# Patient Record
Sex: MALE | Race: WHITE | Employment: PART TIME | URBAN - METROPOLITAN AREA
[De-identification: names, ages, dates, MRNs, and addresses within clinical notes are randomized per-mention and may not be internally consistent; named-entity substitution may affect disease eponyms.]

---

## 2021-07-12 ENCOUNTER — HOSPITAL ENCOUNTER (INPATIENT)
Age: 61
LOS: 5 days | Discharge: HOME | DRG: 192 | End: 2021-07-17
Attending: EMERGENCY MEDICINE | Admitting: INTERNAL MEDICINE
Payer: MEDICAID

## 2021-07-12 ENCOUNTER — APPOINTMENT (OUTPATIENT)
Dept: GENERAL RADIOLOGY | Age: 61
DRG: 192 | End: 2021-07-12
Payer: MEDICAID

## 2021-07-12 DIAGNOSIS — I50.9 CONGESTIVE HEART FAILURE OF UNKNOWN ETIOLOGY (HCC): ICD-10-CM

## 2021-07-12 DIAGNOSIS — I50.9 CONGESTIVE HEART FAILURE, UNSPECIFIED HF CHRONICITY, UNSPECIFIED HEART FAILURE TYPE (HCC): Primary | ICD-10-CM

## 2021-07-12 PROBLEM — F10.10 ALCOHOL ABUSE: Status: ACTIVE | Noted: 2021-07-12

## 2021-07-12 PROBLEM — F17.200 SMOKING: Status: ACTIVE | Noted: 2021-07-12

## 2021-07-12 PROBLEM — I35.0 AORTIC STENOSIS: Status: ACTIVE | Noted: 2021-07-12

## 2021-07-12 PROBLEM — I50.1 ACUTE LEFT-SIDED CHF (CONGESTIVE HEART FAILURE) (HCC): Status: ACTIVE | Noted: 2021-07-12

## 2021-07-12 LAB
ABSOLUTE EOS #: 0.09 K/UL (ref 0–0.44)
ABSOLUTE IMMATURE GRANULOCYTE: <0.03 K/UL (ref 0–0.3)
ABSOLUTE LYMPH #: 1.15 K/UL (ref 1.1–3.7)
ABSOLUTE MONO #: 0.37 K/UL (ref 0.1–1.2)
ALBUMIN SERPL-MCNC: 3.6 G/DL (ref 3.5–5.2)
ALBUMIN/GLOBULIN RATIO: 1.2 (ref 1–2.5)
ALP BLD-CCNC: 135 U/L (ref 40–129)
ALT SERPL-CCNC: 26 U/L (ref 5–41)
ANION GAP SERPL CALCULATED.3IONS-SCNC: 9 MMOL/L (ref 9–17)
AST SERPL-CCNC: 34 U/L
BASOPHILS # BLD: 1 % (ref 0–2)
BASOPHILS ABSOLUTE: 0.03 K/UL (ref 0–0.2)
BILIRUB SERPL-MCNC: 0.91 MG/DL (ref 0.3–1.2)
BILIRUBIN DIRECT: 0.33 MG/DL
BILIRUBIN, INDIRECT: 0.58 MG/DL (ref 0–1)
BNP INTERPRETATION: ABNORMAL
BUN BLDV-MCNC: 23 MG/DL (ref 8–23)
BUN/CREAT BLD: ABNORMAL (ref 9–20)
CALCIUM SERPL-MCNC: 9.3 MG/DL (ref 8.6–10.4)
CHLORIDE BLD-SCNC: 107 MMOL/L (ref 98–107)
CO2: 23 MMOL/L (ref 20–31)
CREAT SERPL-MCNC: 0.72 MG/DL (ref 0.7–1.2)
DIFFERENTIAL TYPE: ABNORMAL
EOSINOPHILS RELATIVE PERCENT: 1 % (ref 1–4)
GFR AFRICAN AMERICAN: >60 ML/MIN
GFR NON-AFRICAN AMERICAN: >60 ML/MIN
GFR SERPL CREATININE-BSD FRML MDRD: ABNORMAL ML/MIN/{1.73_M2}
GFR SERPL CREATININE-BSD FRML MDRD: ABNORMAL ML/MIN/{1.73_M2}
GLOBULIN: ABNORMAL G/DL (ref 1.5–3.8)
GLUCOSE BLD-MCNC: 113 MG/DL (ref 70–99)
HCT VFR BLD CALC: 49.3 % (ref 40.7–50.3)
HEMOGLOBIN: 16 G/DL (ref 13–17)
IMMATURE GRANULOCYTES: 0 %
LV EF: 20 %
LVEF MODALITY: NORMAL
LYMPHOCYTES # BLD: 19 % (ref 24–43)
MCH RBC QN AUTO: 32.1 PG (ref 25.2–33.5)
MCHC RBC AUTO-ENTMCNC: 32.5 G/DL (ref 28.4–34.8)
MCV RBC AUTO: 99 FL (ref 82.6–102.9)
MONOCYTES # BLD: 6 % (ref 3–12)
NRBC AUTOMATED: 0 PER 100 WBC
PDW BLD-RTO: 15.8 % (ref 11.8–14.4)
PLATELET # BLD: 171 K/UL (ref 138–453)
PLATELET ESTIMATE: ABNORMAL
PMV BLD AUTO: 10.7 FL (ref 8.1–13.5)
POTASSIUM SERPL-SCNC: 4.3 MMOL/L (ref 3.7–5.3)
PRO-BNP: 6364 PG/ML
RBC # BLD: 4.98 M/UL (ref 4.21–5.77)
RBC # BLD: ABNORMAL 10*6/UL
SEG NEUTROPHILS: 73 % (ref 36–65)
SEGMENTED NEUTROPHILS ABSOLUTE COUNT: 4.57 K/UL (ref 1.5–8.1)
SODIUM BLD-SCNC: 139 MMOL/L (ref 135–144)
TOTAL PROTEIN: 6.7 G/DL (ref 6.4–8.3)
TROPONIN INTERP: ABNORMAL
TROPONIN INTERP: ABNORMAL
TROPONIN T: ABNORMAL NG/ML
TROPONIN T: ABNORMAL NG/ML
TROPONIN, HIGH SENSITIVITY: 67 NG/L (ref 0–22)
TROPONIN, HIGH SENSITIVITY: 72 NG/L (ref 0–22)
TSH SERPL DL<=0.05 MIU/L-ACNC: 4 MIU/L (ref 0.3–5)
WBC # BLD: 6.2 K/UL (ref 3.5–11.3)
WBC # BLD: ABNORMAL 10*3/UL

## 2021-07-12 PROCEDURE — 6360000002 HC RX W HCPCS: Performed by: STUDENT IN AN ORGANIZED HEALTH CARE EDUCATION/TRAINING PROGRAM

## 2021-07-12 PROCEDURE — 85025 COMPLETE CBC W/AUTO DIFF WBC: CPT

## 2021-07-12 PROCEDURE — 6370000000 HC RX 637 (ALT 250 FOR IP): Performed by: STUDENT IN AN ORGANIZED HEALTH CARE EDUCATION/TRAINING PROGRAM

## 2021-07-12 PROCEDURE — 83880 ASSAY OF NATRIURETIC PEPTIDE: CPT

## 2021-07-12 PROCEDURE — 84484 ASSAY OF TROPONIN QUANT: CPT

## 2021-07-12 PROCEDURE — 99223 1ST HOSP IP/OBS HIGH 75: CPT | Performed by: INTERNAL MEDICINE

## 2021-07-12 PROCEDURE — 36415 COLL VENOUS BLD VENIPUNCTURE: CPT

## 2021-07-12 PROCEDURE — 93005 ELECTROCARDIOGRAM TRACING: CPT | Performed by: STUDENT IN AN ORGANIZED HEALTH CARE EDUCATION/TRAINING PROGRAM

## 2021-07-12 PROCEDURE — 1200000000 HC SEMI PRIVATE

## 2021-07-12 PROCEDURE — 84443 ASSAY THYROID STIM HORMONE: CPT

## 2021-07-12 PROCEDURE — 99284 EMERGENCY DEPT VISIT MOD MDM: CPT

## 2021-07-12 PROCEDURE — 93306 TTE W/DOPPLER COMPLETE: CPT

## 2021-07-12 PROCEDURE — 80048 BASIC METABOLIC PNL TOTAL CA: CPT

## 2021-07-12 PROCEDURE — 80076 HEPATIC FUNCTION PANEL: CPT

## 2021-07-12 PROCEDURE — 71046 X-RAY EXAM CHEST 2 VIEWS: CPT

## 2021-07-12 PROCEDURE — 2580000003 HC RX 258: Performed by: STUDENT IN AN ORGANIZED HEALTH CARE EDUCATION/TRAINING PROGRAM

## 2021-07-12 RX ORDER — GAUZE BANDAGE 2" X 2"
100 BANDAGE TOPICAL DAILY
Status: DISCONTINUED | OUTPATIENT
Start: 2021-07-13 | End: 2021-07-17 | Stop reason: HOSPADM

## 2021-07-12 RX ORDER — HYDROCODONE BITARTRATE AND ACETAMINOPHEN 7.5; 325 MG/1; MG/1
1 TABLET ORAL EVERY 6 HOURS PRN
COMMUNITY
End: 2021-09-13 | Stop reason: ALTCHOICE

## 2021-07-12 RX ORDER — ONDANSETRON 4 MG/1
4 TABLET, ORALLY DISINTEGRATING ORAL EVERY 8 HOURS PRN
Status: DISCONTINUED | OUTPATIENT
Start: 2021-07-12 | End: 2021-07-17 | Stop reason: HOSPADM

## 2021-07-12 RX ORDER — ONDANSETRON 2 MG/ML
4 INJECTION INTRAMUSCULAR; INTRAVENOUS EVERY 6 HOURS PRN
Status: DISCONTINUED | OUTPATIENT
Start: 2021-07-12 | End: 2021-07-17 | Stop reason: HOSPADM

## 2021-07-12 RX ORDER — ACETAMINOPHEN 325 MG/1
650 TABLET ORAL EVERY 6 HOURS PRN
Status: DISCONTINUED | OUTPATIENT
Start: 2021-07-12 | End: 2021-07-16

## 2021-07-12 RX ORDER — SODIUM CHLORIDE 0.9 % (FLUSH) 0.9 %
5-40 SYRINGE (ML) INJECTION EVERY 12 HOURS SCHEDULED
Status: DISCONTINUED | OUTPATIENT
Start: 2021-07-12 | End: 2021-07-17 | Stop reason: HOSPADM

## 2021-07-12 RX ORDER — SODIUM CHLORIDE 9 MG/ML
25 INJECTION, SOLUTION INTRAVENOUS PRN
Status: DISCONTINUED | OUTPATIENT
Start: 2021-07-12 | End: 2021-07-17 | Stop reason: HOSPADM

## 2021-07-12 RX ORDER — FUROSEMIDE 10 MG/ML
20 INJECTION INTRAMUSCULAR; INTRAVENOUS ONCE
Status: COMPLETED | OUTPATIENT
Start: 2021-07-12 | End: 2021-07-12

## 2021-07-12 RX ORDER — POLYETHYLENE GLYCOL 3350 17 G/17G
17 POWDER, FOR SOLUTION ORAL DAILY PRN
Status: DISCONTINUED | OUTPATIENT
Start: 2021-07-12 | End: 2021-07-17 | Stop reason: HOSPADM

## 2021-07-12 RX ORDER — LORAZEPAM 2 MG/ML
0.5 INJECTION INTRAMUSCULAR ONCE
Status: COMPLETED | OUTPATIENT
Start: 2021-07-12 | End: 2021-07-12

## 2021-07-12 RX ORDER — LEVOTHYROXINE SODIUM 0.07 MG/1
75 TABLET ORAL DAILY
Status: DISCONTINUED | OUTPATIENT
Start: 2021-07-12 | End: 2021-07-17 | Stop reason: HOSPADM

## 2021-07-12 RX ORDER — HYDROCODONE BITARTRATE AND ACETAMINOPHEN 5; 325 MG/1; MG/1
1 TABLET ORAL EVERY 6 HOURS PRN
Status: DISCONTINUED | OUTPATIENT
Start: 2021-07-12 | End: 2021-07-17 | Stop reason: HOSPADM

## 2021-07-12 RX ORDER — FOLIC ACID 1 MG/1
1 TABLET ORAL DAILY
Status: DISCONTINUED | OUTPATIENT
Start: 2021-07-13 | End: 2021-07-17 | Stop reason: HOSPADM

## 2021-07-12 RX ORDER — SODIUM CHLORIDE 0.9 % (FLUSH) 0.9 %
5-40 SYRINGE (ML) INJECTION PRN
Status: DISCONTINUED | OUTPATIENT
Start: 2021-07-12 | End: 2021-07-17 | Stop reason: HOSPADM

## 2021-07-12 RX ORDER — FUROSEMIDE 10 MG/ML
20 INJECTION INTRAMUSCULAR; INTRAVENOUS DAILY
Status: DISCONTINUED | OUTPATIENT
Start: 2021-07-13 | End: 2021-07-13

## 2021-07-12 RX ORDER — ACETAMINOPHEN 650 MG/1
650 SUPPOSITORY RECTAL EVERY 6 HOURS PRN
Status: DISCONTINUED | OUTPATIENT
Start: 2021-07-12 | End: 2021-07-17 | Stop reason: HOSPADM

## 2021-07-12 RX ADMIN — FUROSEMIDE 20 MG: 10 INJECTION, SOLUTION INTRAMUSCULAR; INTRAVENOUS at 11:12

## 2021-07-12 RX ADMIN — LEVOTHYROXINE SODIUM 75 MCG: 75 TABLET ORAL at 17:00

## 2021-07-12 RX ADMIN — LORAZEPAM 0.5 MG: 2 INJECTION INTRAMUSCULAR; INTRAVENOUS at 17:01

## 2021-07-12 RX ADMIN — SODIUM CHLORIDE, PRESERVATIVE FREE 5 ML: 5 INJECTION INTRAVENOUS at 21:14

## 2021-07-12 RX ADMIN — ENOXAPARIN SODIUM 40 MG: 40 INJECTION SUBCUTANEOUS at 17:00

## 2021-07-12 RX ADMIN — HYDROCODONE BITARTRATE AND ACETAMINOPHEN 1 TABLET: 5; 325 TABLET ORAL at 17:00

## 2021-07-12 ASSESSMENT — ENCOUNTER SYMPTOMS
DIARRHEA: 0
NAUSEA: 0
RHINORRHEA: 0
CONSTIPATION: 0
SHORTNESS OF BREATH: 1
EYE PAIN: 0
ABDOMINAL PAIN: 0
ABDOMINAL DISTENTION: 0
COUGH: 1
EYE DISCHARGE: 0
VOMITING: 0

## 2021-07-12 ASSESSMENT — PAIN SCALES - GENERAL
PAINLEVEL_OUTOF10: 0
PAINLEVEL_OUTOF10: 0
PAINLEVEL_OUTOF10: 6

## 2021-07-12 NOTE — Clinical Note
Patient Class: Inpatient [101]   REQUIRED: Diagnosis: Congestive heart failure of unknown etiology [facility] [[id number]]   Estimated Length of Stay: Estimated stay of more than 2 midnights   Admitting Provider: Kennedi Juarez [[id number]]   Telemetry/Cardiac Monitoring Required?: Yes

## 2021-07-12 NOTE — ED NOTES
Pt to ED with sister via triage with c/o SOB and bilateral leg pain/swelling. Pt has had this swelling \"for a long time\" but it is \"very bad now. \"  99% O2 on RA. Pt denies CP, n/v/d/c, headache or dizziness.      Reggie Dill RN  07/12/21 [id number]

## 2021-07-12 NOTE — ED PROVIDER NOTES
[facility] ED     Emergency Department     Faculty Attestation    I performed a history and physical examination of the patient and discussed management with the resident. I reviewed the residents note and agree with the documented findings and plan of care. Any areas of disagreement are noted on the chart. I was personally present for the key portions of any procedures. I have documented in the chart those procedures where I was not present during the key portions. I have reviewed the emergency nurses triage note. I agree with the chief complaint, past medical history, past surgical history, allergies, medications, social and family history as documented unless otherwise noted below. For Physician Assistant/ Nurse Practitioner cases/documentation I have personally evaluated this patient and have completed at least one if not all key elements of the E/M (history, physical exam, and MDM). Additional findings are as noted. Patient presents with swelling to his bilateral lower extremities as well as shortness of breath with exertion. Patient says he was at his physical therapy appointment last week and the physical therapist noticed the swelling. He says that they wanted him to go to the ER then but said that he had things to do and so decided to come in today. He denies any pain in his chest.  He denies fever, cough, abdominal pain, nausea or vomiting. On exam, patient is resting comfortably in the bed. Lungs are clear to auscultation bilaterally heart sounds are normal.  There is moderate pitting edema to the bilateral lower extremities. No erythema or warmth. Will get EKG, chest x-ray, labs and reassess.     EKG Interpretation    Interpreted by emergency department physician    Rhythm: normal sinus   Rate: normal  Axis: left  Ectopy: none  Conduction: normal  ST Segments: nonspecific changes  T Waves: non specific changes  Q Waves: none    Clinical Impression: non-specific EKG    Jhonatan Palm MD Gerard Parra MD  Attending Emergency  Physician              Kaci Amador MD  07/12/21 1392

## 2021-07-12 NOTE — ED PROVIDER NOTES
[facility]  ED  Emergency Department Encounter  Emergency Medicine Resident     Pt Name: Yecenia Harper  MRN: [id number]  [place] 1960  Date of evaluation: 7/12/21  PCP:  Karla Apodaca MD    CHIEF COMPLAINT       Chief Complaint   Patient presents with    Shortness of Breath     pt states x1 week.  Joint Swelling     pt states bilateral ankle swelling       HISTORY OFPRESENT ILLNESS  (Location/Symptom, Timing/Onset, Context/Setting, Quality, Duration, Modifying Factors,Severity.)      Yecenia Harper is a 63 y. o.yo male who presents with difficult past medical history, with complaints of bilateral lower extremity leg edema for the past 10 days. Patient states that he has also had associated shortness of breath. Denies any chest pain at this time. States that he works for the [employer] and his leg swelling is worse at the end of the day when he has been up on his legs all day. Went to physical therapy due to multiple episodes of twisting his ankle while walking through yards. Patient states he has been having increasing swelling and pain for the past 10 days. Has not seen a physician in over 15 years for medical evaluation with exception of back pain which has been chronic. Denies any radiation of pain from his legs. States that they are swelling up towards his knees. Shortness of breath has been increasingly worsening over the past 10 days as well. Patient was at his physical therapy appointment today when he noted that he was having increasing swelling symptoms the emergency department to be evaluated. PAST MEDICAL / SURGICAL / SOCIAL / FAMILY HISTORY      has a past medical history of Osteoarthritis. has no past surgical history on file. Social:  reports that he has been smoking. He has a 10.00 pack-year smoking history. He has never used smokeless tobacco. He reports current alcohol use. He reports that he does not use drugs.     Family Hx: History reviewed. No pertinent family history. Allergies:  Patient has no known allergies. Home Medications:  Prior to Admission medications    Medication Sig Start Date End Date Taking? Authorizing Provider   HYDROcodone-acetaminophen (NORCO) 7.5-325 MG per tablet Take 1 tablet by mouth every 6 hours as needed for Pain. Yes Historical Provider, MD   meloxicam (MOBIC) 7.5 MG tablet TAKE 1 TAB BY MOUTH ONCE A DAY 4/13/21  Yes Victoria Kuo MD   levothyroxine (SYNTHROID) 75 MCG tablet TAKE 1 TAB BY MOUTH ONCE A DAY 4/13/21  Yes Victoria Kuo MD       REVIEW OFSYSTEMS    (2-9 systems for level 4, 10 or more for level 5)      Review of Systems   Constitutional: Negative for chills and fever. HENT: Negative for congestion, ear pain and rhinorrhea. Eyes: Negative for pain and discharge. Respiratory: Positive for cough and shortness of breath. Cardiovascular: Positive for leg swelling. Negative for chest pain and palpitations. Gastrointestinal: Negative for abdominal pain, constipation, diarrhea, nausea and vomiting. Genitourinary: Negative for difficulty urinating and hematuria. Musculoskeletal: Negative for arthralgias and myalgias. Skin: Negative for rash and wound. Neurological: Negative for light-headedness and headaches. PHYSICAL EXAM   (up to 7 for level 4, 8 or more forlevel 5)      INITIAL VITALS:   Vitals:    07/12/21 2130   BP: 101/78   Pulse: 62   Resp:    Temp:    SpO2:         Physical Exam  Vitals and nursing note reviewed. Constitutional:       General: He is not in acute distress. Appearance: He is not ill-appearing, toxic-appearing or diaphoretic. HENT:      Head: Normocephalic and atraumatic. Nose: Nose normal. No congestion or rhinorrhea. Mouth/Throat:      Mouth: Mucous membranes are moist.      Pharynx: Oropharynx is clear. No oropharyngeal exudate or posterior oropharyngeal erythema. Eyes:      General:         Right eye: No discharge. Left eye: No discharge. Pupils: Pupils are equal, round, and reactive to light. Cardiovascular:      Rate and Rhythm: Normal rate and regular rhythm. Pulses: Normal pulses. Heart sounds: No murmur heard. No friction rub. No gallop. Comments: Pulses palpable at bilateral radial arteries, 2+   Pulmonary:      Effort: Pulmonary effort is normal. No respiratory distress. Breath sounds: Normal breath sounds. No wheezing, rhonchi or rales. Chest:      Chest wall: No mass, tenderness or edema. Abdominal:      General: Abdomen is flat. There is no distension. Palpations: Abdomen is soft. Tenderness: There is no abdominal tenderness. There is no guarding or rebound. Musculoskeletal:      Cervical back: Normal range of motion. Right lower leg: Edema (1+ pitting) present. Left lower leg: Edema (1+ pitting) present. Skin:     General: Skin is warm and dry. Capillary Refill: Capillary refill takes less than 2 seconds. Neurological:      General: No focal deficit present. Mental Status: He is alert and oriented to person, place, and time. Comments: Moving all extremities equally. Psychiatric:         Mood and Affect: Mood normal.         Behavior: Behavior normal.         DIFFERENTIAL  DIAGNOSIS       Initial MDM/Plan: 63 y.o. male who presents with 10 days of worsening bilateral lower extremity edema. Patient presents with vital signs stable, in no acute distress, no respiratory distress, resting in the cot doubly. Physical exam is grossly unremarkable with exception for bilateral lower extremity edema. Patient has not seen a physician in about 15 years has not had his heart evaluated, noting no recent echo. Does not take any medications for his heart. Plan for cardiac work-up including CBC, BMP, BNP, troponins x2, EKG, chest x-ray. CBC showing no acute abnormalities, no signs of infection, no acute anemia.   BNP is significantly elevated at 6300.  Will provide one-time dose of Lasix, patient is Lasix naïve. Feeling quite anxious at this time due to fear of being in the hospital not having been hospital in quite some time. Did provide 0.5 mg of Ativan, good relief. Discussed that this is likely CHF, new onset, patient is understanding of this. Patient is agreeable to stay in the hospital.  Patient's blood pressure has been within normal limits, if blood pressure does begin to elevate will consider nitro drip. Patient continues to be chest pain-free. Troponin initially 72, repeat down to 67. Chest x-ray showing cardiomegaly with mild increased vascularization. Patient continues to be saturating well on room air. Decision to admit for initial CHF exacerbation. Discussed with internal medicine team.  They are excepting patient at this time. Patient boarding in the emergency department awaiting bed upstairs. Admitted in stable condition after being vitally stable throughout emergency department stay.     DIAGNOSTIC RESULTS / EMERGENCYDEPARTMENT COURSE / MDM     LABS:  Labs Reviewed   CBC WITH AUTO DIFFERENTIAL - Abnormal; Notable for the following components:       Result Value    RDW 15.8 (*)     Seg Neutrophils 73 (*)     Lymphocytes 19 (*)     All other components within normal limits   BASIC METABOLIC PANEL W/ REFLEX TO MG FOR LOW K - Abnormal; Notable for the following components:    Glucose 113 (*)     All other components within normal limits   HEPATIC FUNCTION PANEL - Abnormal; Notable for the following components:    Alkaline Phosphatase 135 (*)     Bilirubin, Direct 0.33 (*)     All other components within normal limits   TROPONIN - Abnormal; Notable for the following components:    Troponin, High Sensitivity 67 (*)     All other components within normal limits   BRAIN NATRIURETIC PEPTIDE - Abnormal; Notable for the following components:    Pro-BNP 6,364 (*)     All other components within normal limits   TROPONIN - Abnormal; Notable for the following components:    Troponin, High Sensitivity 72 (*)     All other components within normal limits   BASIC METABOLIC PANEL W/ REFLEX TO MG FOR LOW K - Abnormal; Notable for the following components:    Anion Gap 8 (*)     All other components within normal limits   CBC WITH AUTO DIFFERENTIAL - Abnormal; Notable for the following components:    RDW 15.8 (*)     Seg Neutrophils 68 (*)     Lymphocytes 23 (*)     All other components within normal limits   LIPID PANEL - Abnormal; Notable for the following components:    HDL 36 (*)     All other components within normal limits   TSH WITH REFLEX   HEMOGLOBIN A1C   POCT GLUCOSE   POCT GLUCOSE   POCT GLUCOSE         RADIOLOGY:  XR CHEST (2 VW)    Result Date: 7/12/2021  EXAMINATION: TWO XRAY VIEWS OF THE CHEST 7/12/2021 9:59 am COMPARISON: None. HISTORY: ORDERING SYSTEM PROVIDED HISTORY: shortness of breath TECHNOLOGIST PROVIDED HISTORY: shortness of breath FINDINGS: Frontal and lateral views of the chest are electronically marked regarding sides. Marked cardiomegaly. Pulmonary vascularity is within normal limits. No infiltrates, effusions or pneumothoraces. Mild-moderate decreased anterior vertebral body height at the thoracolumbar junction, chronicity unknown. There are some marginal osteophytes seen within the spine. Prominent cardiomegaly without evidence of CHF or pulmonary edema. Anterior wedged lower thoracic vertebra, chronicity unknown. EKG  Normal, normal sinus rhythm, Axis is leftward, intervals within normal limits including CO, QT/QTc.  QRS is prolonged at 132. T wave inversion noted in lead I and aVL. No ST segment elevation, no ST segment depression, poor R wave progression. Nonspecific EKG.     All EKG's are interpreted by the Emergency Department Physicianwho either signs or Co-signs this chart in the absence of a cardiologist.    EMERGENCY DEPARTMENT COURSE:          PROCEDURES:  None    CONSULTS:  IP CONSULT TO INTERNAL MEDICINE  IP CONSULT TO CASE MANAGEMENT  IP CONSULT TO CARDIOLOGY      FINAL IMPRESSION      1. Congestive heart failure, unspecified HF chronicity, unspecified heart failure type ([address]          DISPOSITION / PLAN     DISPOSITION Admitted 07/12/2021 11:10:07 AM      PATIENT REFERRED TO:  No follow-up provider specified.     DISCHARGE MEDICATIONS:  Current Discharge Medication List          Randy Martins DO  Emergency Medicine Resident    (Please note that portions of this note were completed with a voice recognition program.Efforts were made to edit the dictations but occasionally words are mis-transcribed.)       Randy Martins DO  Resident  07/13/21 5236

## 2021-07-12 NOTE — H&P
[facility]     Department of Internal Medicine - Staff Internal Medicine Teaching Service          ADMISSION NOTE/HISTORY AND PHYSICAL EXAMINATION   Date: 7/12/2021  Patient Name: Lizzeth Laureano  Date of admission: 7/12/2021  9:19 AM  YOB: 1960  PCP: Pradeep Ruiz MD  History Obtained From:  patient    CHIEF COMPLAINT     Chief complaint: LE swelling and SOB     HISTORY OF PRESENTING ILLNESS     The patient is a pleasant 63 y.o. male past medical history of recent hip injury, osteoarthritis, hypothyroidism presents with a chief complaint of   Shortness of breath and lower leg swelling for the past 10 days. .  Patient 1st noticed during his physical therapy sessions when his physical therapist noticed the swelling. states that his legs have looked increasingly more swollen than regular. Denies any chest pain nausea vomiting or abdominal pain. He reports no past history of CHF or any cardiac events. Patient does report that he has chest pain or shortness of breath whenever he exerts himself. Echo performed showed 20% EF with substantial aortic stenosis. At bedside patient is resting comfortably with no increased work of breathing. Vitals:    07/12/21 1955   BP: (!) 83/49   Pulse: 55   Resp: 20   Temp: 98 °F (36.7 °C)   SpO2: 95%       On my evaluation,  Vitals:    07/12/21 1955   BP: (!) 83/49   Pulse: 55   Resp: 20   Temp: 98 °F (36.7 °C)   SpO2: 95%         Review of Systems:  Review of Systems   Constitutional: Positive for fatigue. Negative for activity change, appetite change, chills and diaphoresis. Respiratory: Positive for shortness of breath. Cardiovascular: Positive for leg swelling. Negative for chest pain and palpitations. Gastrointestinal: Negative for abdominal distention, abdominal pain, constipation, diarrhea, nausea and vomiting. Musculoskeletal: Positive for gait problem. Skin: Negative.     Neurological: Negative for dizziness, facial asymmetry, light-headedness and headaches. Psychiatric/Behavioral: Negative for agitation, behavioral problems and confusion. PAST MEDICAL HISTORY     Past Medical History:   Diagnosis Date    Osteoarthritis        PAST SURGICAL HISTORY     History reviewed. No pertinent surgical history. ALLERGIES     Patient has no known allergies. MEDICATIONS PRIOR TO ADMISSION     Prior to Admission medications    Medication Sig Start Date End Date Taking? Authorizing Provider   HYDROcodone-acetaminophen (NORCO) 7.5-325 MG per tablet Take 1 tablet by mouth every 6 hours as needed for Pain. Yes Historical Provider, MD   meloxicam (MOBIC) 7.5 MG tablet TAKE 1 TAB BY MOUTH ONCE A DAY 21  Yes Victoria Kuo MD   levothyroxine (SYNTHROID) 75 MCG tablet TAKE 1 TAB BY MOUTH ONCE A DAY 21  Yes Victoria Kuo MD       SOCIAL HISTORY     Tobacco: yes  Alcohol: yes  Illicits: no      FAMILY HISTORY     History reviewed. No pertinent family history. PHYSICAL EXAM     Vitals: BP (!) 83/49   Pulse 55   Temp 98 °F (36.7 °C) (Oral)   Resp 20   Ht 5' 10\" (1.778 m)   Wt 194 lb 10.7 oz (88.3 kg)   SpO2 95%   BMI 27.93 kg/m²   Tmax: Temp (24hrs), Av.5 °F (36.4 °C), Min:97 °F (36.1 °C), Max:98 °F (36.7 °C)    Last Body weight:   Wt Readings from Last 3 Encounters:   21 194 lb 10.7 oz (88.3 kg)   21 186 lb (84.4 kg)   21 192 lb (87.1 kg)     Body Mass Index : Body mass index is 27.93 kg/m². PHYSICAL EXAMINATION:  Physical Exam  Constitutional:       Appearance: Normal appearance. Cardiovascular:      Rate and Rhythm: Normal rate and regular rhythm. Pulses: Normal pulses. Heart sounds: Normal heart sounds. Pulmonary:      Effort: Pulmonary effort is normal.   Abdominal:      General: There is no distension. Palpations: Abdomen is soft. Tenderness: There is no abdominal tenderness. There is no rebound.    Musculoskeletal:         General: Swelling present. Right lower leg: Edema present. Left lower leg: Edema present. Skin:     General: Skin is warm and dry. Neurological:      General: No focal deficit present. Mental Status: He is alert and oriented to person, place, and time.    Psychiatric:         Mood and Affect: Mood normal.         Behavior: Behavior normal.           INVESTIGATIONS     Laboratory Testing:     Recent Results (from the past 24 hour(s))   CBC Auto Differential    Collection Time: 07/12/21 10:02 AM   Result Value Ref Range    WBC 6.2 3.5 - 11.3 k/uL    RBC 4.98 4.21 - 5.77 m/uL    Hemoglobin 16.0 13.0 - 17.0 g/dL    Hematocrit 49.3 40.7 - 50.3 %    MCV 99.0 82.6 - 102.9 fL    MCH 32.1 25.2 - 33.5 pg    MCHC 32.5 28.4 - 34.8 g/dL    RDW 15.8 (H) 11.8 - 14.4 %    Platelets 538 063 - 214 k/uL    MPV 10.7 8.1 - 13.5 fL    NRBC Automated 0.0 0.0 per 100 WBC    Differential Type NOT REPORTED     Seg Neutrophils 73 (H) 36 - 65 %    Lymphocytes 19 (L) 24 - 43 %    Monocytes 6 3 - 12 %    Eosinophils % 1 1 - 4 %    Basophils 1 0 - 2 %    Immature Granulocytes 0 0 %    Segs Absolute 4.57 1.50 - 8.10 k/uL    Absolute Lymph # 1.15 1.10 - 3.70 k/uL    Absolute Mono # 0.37 0.10 - 1.20 k/uL    Absolute Eos # 0.09 0.00 - 0.44 k/uL    Basophils Absolute 0.03 0.00 - 0.20 k/uL    Absolute Immature Granulocyte <0.03 0.00 - 0.30 k/uL    WBC Morphology NOT REPORTED     RBC Morphology ANISOCYTOSIS PRESENT     Platelet Estimate NOT REPORTED    Basic Metabolic Panel w/ Reflex to MG    Collection Time: 07/12/21 10:02 AM   Result Value Ref Range    Glucose 113 (H) 70 - 99 mg/dL    BUN 23 8 - 23 mg/dL    CREATININE 0.72 0.70 - 1.20 mg/dL    Bun/Cre Ratio NOT REPORTED 9 - 20    Calcium 9.3 8.6 - 10.4 mg/dL    Sodium 139 135 - 144 mmol/L    Potassium 4.3 3.7 - 5.3 mmol/L    Chloride 107 98 - 107 mmol/L    CO2 23 20 - 31 mmol/L    Anion Gap 9 9 - 17 mmol/L    GFR Non-African American >60 >60 mL/min    GFR African American >60 >60 mL/min    GFR Comment GFR Staging NOT REPORTED    Hepatic Function Panel    Collection Time: 07/12/21 10:02 AM   Result Value Ref Range    Albumin 3.6 3.5 - 5.2 g/dL    Alkaline Phosphatase 135 (H) 40 - 129 U/L    ALT 26 5 - 41 U/L    AST 34 <40 U/L    Total Bilirubin 0.91 0.3 - 1.2 mg/dL    Bilirubin, Direct 0.33 (H) <0.31 mg/dL    Bilirubin, Indirect 0.58 0.00 - 1.00 mg/dL    Total Protein 6.7 6.4 - 8.3 g/dL    Globulin NOT REPORTED 1.5 - 3.8 g/dL    Albumin/Globulin Ratio 1.2 1.0 - 2.5   Brain Natriuretic Peptide    Collection Time: 07/12/21 10:02 AM   Result Value Ref Range    Pro-BNP 6,364 (H) <300 pg/mL    BNP Interpretation Pro-BNP Reference Range:    Troponin    Collection Time: 07/12/21 10:02 AM   Result Value Ref Range    Troponin, High Sensitivity 72 (HH) 0 - 22 ng/L    Troponin T NOT REPORTED <0.03 ng/mL    Troponin Interp NOT REPORTED    Troponin    Collection Time: 07/12/21 11:25 AM   Result Value Ref Range    Troponin, High Sensitivity 67 (HH) 0 - 22 ng/L    Troponin T NOT REPORTED <0.03 ng/mL    Troponin Interp NOT REPORTED    TSH with Reflex    Collection Time: 07/12/21 11:55 AM   Result Value Ref Range    TSH 4.00 0.30 - 5.00 mIU/L       Imaging:   XR CHEST (2 VW)    Result Date: 7/12/2021  Prominent cardiomegaly without evidence of CHF or pulmonary edema. Anterior wedged lower thoracic vertebra, chronicity unknown.        ASSESSMENT & PLAN     ASSESSMENT:     Primary Problem  Aortic stenosis    Active Hospital Problems    Diagnosis Date Noted    Congestive heart failure of unknown etiology ([facility] [address] [I50.9] 07/12/2021    Acute left-sided CHF (congestive heart failure) ([address] [I50.1] 07/12/2021    Smoking [F17.200] 07/12/2021    Aortic stenosis [I35.0] 07/12/2021    Alcohol abuse [F10.10] 07/12/2021    Hypothyroid [E03.9] 04/20/2015       PLAN:     IMPRESSION  This is a 63 y.o. male who presented with above mentioned complaints and was admitted to inpatient service for further management as follows:

## 2021-07-12 NOTE — PROGRESS NOTES
Physical Therapy        Physical Therapy Cancel Note      DATE: 2021    NAME: Fabiola Giron  MRN: [id number]   : 1960      Patient not seen this date for Physical Therapy due to:    Patient Declined: Pt stated he is tired. Just up from ED. 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       Electronically signed by Zara Blake PT on 2021 at 2:31 PM

## 2021-07-12 NOTE — ED NOTES
Repeat trop sent.    Case management at bedside speaking with pt     Francis Santiago RN  07/12/21 [id number]

## 2021-07-12 NOTE — CARE COORDINATION
Case Management Initial Discharge Plan  Victor Hugo Lama,             Met with:patient to discuss discharge plans. Information verified: address, contacts, phone number, , insurance Yes  Insurance Provider: none    Emergency Contact/Next of Kin name & number: sister Akhil Gaxiola verified as per face sheet  Who are involved in patient's support system? Sister (at bedside)    PCP: Daniel Mariano MD  Date of last visit: 2021      Discharge Planning    Living Arrangements:    son lives with patient in patients home    Home has 3 stories  5 stairs to climb to get into front door, flight of stairs to reach second floor, flight to basement  Location of bedroom/bathroom in home bedroom main, shower in basement    Patient able to perform ADL's:Independent    Current Services (outpatient & in home) DME  DME equipment: cane, tens unit  DME provider:     Is patient receiving oral anticoagulation therapy? No    If indicated:   Physician managing anticoagulation treatment: none  Where does patient obtain lab work for ATC treatment? na      Potential Assistance Needed:       Patient agreeable to home care: Yes  Freedom of choice provided:  monitor for needs, works nights and sleep is split    Prior SNF/Rehab Placement and Facility: none  Agreeable to SNF/Rehab: No  [place] of choice provided: n/a     Evaluation: no    Expected Discharge date:       Patient expects to be discharged to: If home: is the family and/or caregiver wiling & able to provide support at home? yes  Who will be providing this support? son*    Follow Up Appointment: Best Day/ Time:      Transportation provider:   Transportation arrangements needed for discharge: No    Readmission Risk              Risk of Unplanned Readmission:  0             Does patient have a readmission risk score greater than 14?: No  If yes, follow-up appointment must be made within 7 days of discharge.      Goals of Care: self care      Educated pt on transitional options, provided freedom of choice and are agreeable with plan      Discharge Plan: home independent, has transport, monitor for home care needs, help program notified (message left for Melanie Aguirre)          Electronically signed by Kennis Cushing, RN on 7/12/21 at 11:52 AM EDT

## 2021-07-13 ENCOUNTER — APPOINTMENT (OUTPATIENT)
Dept: MRI IMAGING | Age: 61
DRG: 192 | End: 2021-07-13
Payer: MEDICAID

## 2021-07-13 LAB
ABSOLUTE EOS #: 0.11 K/UL (ref 0–0.44)
ABSOLUTE IMMATURE GRANULOCYTE: <0.03 K/UL (ref 0–0.3)
ABSOLUTE LYMPH #: 1.32 K/UL (ref 1.1–3.7)
ABSOLUTE MONO #: 0.32 K/UL (ref 0.1–1.2)
ANION GAP SERPL CALCULATED.3IONS-SCNC: 8 MMOL/L (ref 9–17)
BASOPHILS # BLD: 1 % (ref 0–2)
BASOPHILS ABSOLUTE: 0.03 K/UL (ref 0–0.2)
BUN BLDV-MCNC: 21 MG/DL (ref 8–23)
BUN/CREAT BLD: ABNORMAL (ref 9–20)
CALCIUM SERPL-MCNC: 8.7 MG/DL (ref 8.6–10.4)
CHLORIDE BLD-SCNC: 105 MMOL/L (ref 98–107)
CHOLESTEROL/HDL RATIO: 3.6
CHOLESTEROL: 131 MG/DL
CO2: 24 MMOL/L (ref 20–31)
CREAT SERPL-MCNC: 0.89 MG/DL (ref 0.7–1.2)
DIFFERENTIAL TYPE: ABNORMAL
EKG ATRIAL RATE: 72 BPM
EKG P AXIS: 52 DEGREES
EKG P-R INTERVAL: 190 MS
EKG Q-T INTERVAL: 438 MS
EKG QRS DURATION: 132 MS
EKG QTC CALCULATION (BAZETT): 479 MS
EKG R AXIS: -54 DEGREES
EKG T AXIS: 131 DEGREES
EKG VENTRICULAR RATE: 72 BPM
EOSINOPHILS RELATIVE PERCENT: 2 % (ref 1–4)
ESTIMATED AVERAGE GLUCOSE: 126 MG/DL
GFR AFRICAN AMERICAN: >60 ML/MIN
GFR NON-AFRICAN AMERICAN: >60 ML/MIN
GFR SERPL CREATININE-BSD FRML MDRD: ABNORMAL ML/MIN/{1.73_M2}
GFR SERPL CREATININE-BSD FRML MDRD: ABNORMAL ML/MIN/{1.73_M2}
GLUCOSE BLD-MCNC: 114 MG/DL (ref 75–110)
GLUCOSE BLD-MCNC: 118 MG/DL (ref 75–110)
GLUCOSE BLD-MCNC: 85 MG/DL (ref 75–110)
GLUCOSE BLD-MCNC: 93 MG/DL (ref 75–110)
GLUCOSE BLD-MCNC: 97 MG/DL (ref 70–99)
HBA1C MFR BLD: 6 % (ref 4–6)
HCT VFR BLD CALC: 48.6 % (ref 40.7–50.3)
HDLC SERPL-MCNC: 36 MG/DL
HEMOGLOBIN: 15.5 G/DL (ref 13–17)
IMMATURE GRANULOCYTES: 0 %
LDL CHOLESTEROL: 80 MG/DL (ref 0–130)
LYMPHOCYTES # BLD: 23 % (ref 24–43)
MCH RBC QN AUTO: 31.6 PG (ref 25.2–33.5)
MCHC RBC AUTO-ENTMCNC: 31.9 G/DL (ref 28.4–34.8)
MCV RBC AUTO: 99.2 FL (ref 82.6–102.9)
MONOCYTES # BLD: 6 % (ref 3–12)
NRBC AUTOMATED: 0 PER 100 WBC
PDW BLD-RTO: 15.8 % (ref 11.8–14.4)
PLATELET # BLD: 184 K/UL (ref 138–453)
PLATELET ESTIMATE: ABNORMAL
PMV BLD AUTO: 11 FL (ref 8.1–13.5)
POTASSIUM SERPL-SCNC: 4.3 MMOL/L (ref 3.7–5.3)
RBC # BLD: 4.9 M/UL (ref 4.21–5.77)
RBC # BLD: ABNORMAL 10*6/UL
SEG NEUTROPHILS: 68 % (ref 36–65)
SEGMENTED NEUTROPHILS ABSOLUTE COUNT: 3.9 K/UL (ref 1.5–8.1)
SODIUM BLD-SCNC: 137 MMOL/L (ref 135–144)
TRIGL SERPL-MCNC: 74 MG/DL
VLDLC SERPL CALC-MCNC: ABNORMAL MG/DL (ref 1–30)
WBC # BLD: 5.7 K/UL (ref 3.5–11.3)
WBC # BLD: ABNORMAL 10*3/UL

## 2021-07-13 PROCEDURE — 6360000002 HC RX W HCPCS: Performed by: STUDENT IN AN ORGANIZED HEALTH CARE EDUCATION/TRAINING PROGRAM

## 2021-07-13 PROCEDURE — 36415 COLL VENOUS BLD VENIPUNCTURE: CPT

## 2021-07-13 PROCEDURE — 83036 HEMOGLOBIN GLYCOSYLATED A1C: CPT

## 2021-07-13 PROCEDURE — 85025 COMPLETE CBC W/AUTO DIFF WBC: CPT

## 2021-07-13 PROCEDURE — 80048 BASIC METABOLIC PNL TOTAL CA: CPT

## 2021-07-13 PROCEDURE — 99232 SBSQ HOSP IP/OBS MODERATE 35: CPT | Performed by: INTERNAL MEDICINE

## 2021-07-13 PROCEDURE — 1200000000 HC SEMI PRIVATE

## 2021-07-13 PROCEDURE — 6370000000 HC RX 637 (ALT 250 FOR IP): Performed by: STUDENT IN AN ORGANIZED HEALTH CARE EDUCATION/TRAINING PROGRAM

## 2021-07-13 PROCEDURE — 6370000000 HC RX 637 (ALT 250 FOR IP): Performed by: INTERNAL MEDICINE

## 2021-07-13 PROCEDURE — 80061 LIPID PANEL: CPT

## 2021-07-13 PROCEDURE — 2580000003 HC RX 258: Performed by: STUDENT IN AN ORGANIZED HEALTH CARE EDUCATION/TRAINING PROGRAM

## 2021-07-13 PROCEDURE — 72146 MRI CHEST SPINE W/O DYE: CPT

## 2021-07-13 PROCEDURE — 82947 ASSAY GLUCOSE BLOOD QUANT: CPT

## 2021-07-13 PROCEDURE — 93970 EXTREMITY STUDY: CPT

## 2021-07-13 RX ORDER — HYDROXYZINE HYDROCHLORIDE 10 MG/1
10 TABLET, FILM COATED ORAL ONCE
Status: COMPLETED | OUTPATIENT
Start: 2021-07-13 | End: 2021-07-13

## 2021-07-13 RX ORDER — LORAZEPAM 2 MG/ML
0.5 INJECTION INTRAMUSCULAR ONCE
Status: COMPLETED | OUTPATIENT
Start: 2021-07-13 | End: 2021-07-13

## 2021-07-13 RX ORDER — DEXTROSE MONOHYDRATE 25 G/50ML
12.5 INJECTION, SOLUTION INTRAVENOUS PRN
Status: DISCONTINUED | OUTPATIENT
Start: 2021-07-13 | End: 2021-07-17 | Stop reason: HOSPADM

## 2021-07-13 RX ORDER — LORAZEPAM 0.5 MG/1
0.5 TABLET ORAL ONCE
Status: DISCONTINUED | OUTPATIENT
Start: 2021-07-13 | End: 2021-07-13

## 2021-07-13 RX ORDER — ASPIRIN 81 MG/1
81 TABLET, CHEWABLE ORAL DAILY
Status: DISCONTINUED | OUTPATIENT
Start: 2021-07-13 | End: 2021-07-17 | Stop reason: HOSPADM

## 2021-07-13 RX ORDER — FUROSEMIDE 10 MG/ML
20 INJECTION INTRAMUSCULAR; INTRAVENOUS ONCE
Status: COMPLETED | OUTPATIENT
Start: 2021-07-13 | End: 2021-07-13

## 2021-07-13 RX ORDER — ATORVASTATIN CALCIUM 20 MG/1
20 TABLET, FILM COATED ORAL NIGHTLY
Status: DISCONTINUED | OUTPATIENT
Start: 2021-07-13 | End: 2021-07-17 | Stop reason: HOSPADM

## 2021-07-13 RX ORDER — DEXTROSE MONOHYDRATE 50 MG/ML
100 INJECTION, SOLUTION INTRAVENOUS PRN
Status: DISCONTINUED | OUTPATIENT
Start: 2021-07-13 | End: 2021-07-17 | Stop reason: HOSPADM

## 2021-07-13 RX ORDER — FUROSEMIDE 10 MG/ML
40 INJECTION INTRAMUSCULAR; INTRAVENOUS 2 TIMES DAILY
Status: DISCONTINUED | OUTPATIENT
Start: 2021-07-13 | End: 2021-07-17 | Stop reason: HOSPADM

## 2021-07-13 RX ORDER — NICOTINE POLACRILEX 4 MG
15 LOZENGE BUCCAL PRN
Status: DISCONTINUED | OUTPATIENT
Start: 2021-07-13 | End: 2021-07-17 | Stop reason: HOSPADM

## 2021-07-13 RX ORDER — FUROSEMIDE 10 MG/ML
40 INJECTION INTRAMUSCULAR; INTRAVENOUS 2 TIMES DAILY
Status: DISCONTINUED | OUTPATIENT
Start: 2021-07-13 | End: 2021-07-13

## 2021-07-13 RX ADMIN — HYDROCODONE BITARTRATE AND ACETAMINOPHEN 1 TABLET: 5; 325 TABLET ORAL at 11:33

## 2021-07-13 RX ADMIN — FUROSEMIDE 20 MG: 10 INJECTION, SOLUTION INTRAVENOUS at 12:22

## 2021-07-13 RX ADMIN — FOLIC ACID 1 MG: 1 TABLET ORAL at 08:33

## 2021-07-13 RX ADMIN — ENOXAPARIN SODIUM 40 MG: 40 INJECTION SUBCUTANEOUS at 08:34

## 2021-07-13 RX ADMIN — SODIUM CHLORIDE, PRESERVATIVE FREE 10 ML: 5 INJECTION INTRAVENOUS at 21:29

## 2021-07-13 RX ADMIN — Medication 100 MG: at 08:33

## 2021-07-13 RX ADMIN — LEVOTHYROXINE SODIUM 75 MCG: 75 TABLET ORAL at 08:33

## 2021-07-13 RX ADMIN — HYDROXYZINE HYDROCHLORIDE 10 MG: 10 TABLET ORAL at 21:29

## 2021-07-13 RX ADMIN — FUROSEMIDE 40 MG: 10 INJECTION, SOLUTION INTRAMUSCULAR; INTRAVENOUS at 21:27

## 2021-07-13 RX ADMIN — SODIUM CHLORIDE, PRESERVATIVE FREE 10 ML: 5 INJECTION INTRAVENOUS at 08:34

## 2021-07-13 RX ADMIN — FUROSEMIDE 20 MG: 10 INJECTION, SOLUTION INTRAVENOUS at 08:33

## 2021-07-13 RX ADMIN — LORAZEPAM 0.5 MG: 2 INJECTION INTRAMUSCULAR; INTRAVENOUS at 13:20

## 2021-07-13 RX ADMIN — HYDROCODONE BITARTRATE AND ACETAMINOPHEN 1 TABLET: 5; 325 TABLET ORAL at 18:08

## 2021-07-13 RX ADMIN — ASPIRIN 81 MG: 81 TABLET, CHEWABLE ORAL at 08:33

## 2021-07-13 RX ADMIN — HYDROCODONE BITARTRATE AND ACETAMINOPHEN 1 TABLET: 5; 325 TABLET ORAL at 03:37

## 2021-07-13 RX ADMIN — ATORVASTATIN CALCIUM 20 MG: 20 TABLET, FILM COATED ORAL at 21:29

## 2021-07-13 RX ADMIN — HYDROXYZINE HYDROCHLORIDE 10 MG: 10 TABLET ORAL at 04:36

## 2021-07-13 ASSESSMENT — PAIN DESCRIPTION - DESCRIPTORS
DESCRIPTORS: ACHING
DESCRIPTORS: ACHING

## 2021-07-13 ASSESSMENT — PAIN DESCRIPTION - LOCATION
LOCATION: HIP
LOCATION: HIP

## 2021-07-13 ASSESSMENT — PAIN DESCRIPTION - ORIENTATION
ORIENTATION: LEFT
ORIENTATION: LEFT

## 2021-07-13 ASSESSMENT — PAIN SCALES - GENERAL
PAINLEVEL_OUTOF10: 6
PAINLEVEL_OUTOF10: 6
PAINLEVEL_OUTOF10: 2
PAINLEVEL_OUTOF10: 6
PAINLEVEL_OUTOF10: 3
PAINLEVEL_OUTOF10: 3
PAINLEVEL_OUTOF10: 0

## 2021-07-13 ASSESSMENT — PAIN DESCRIPTION - PAIN TYPE
TYPE: ACUTE PAIN
TYPE: ACUTE PAIN

## 2021-07-13 ASSESSMENT — PAIN DESCRIPTION - ONSET: ONSET: GRADUAL

## 2021-07-13 ASSESSMENT — PAIN - FUNCTIONAL ASSESSMENT: PAIN_FUNCTIONAL_ASSESSMENT: PREVENTS OR INTERFERES SOME ACTIVE ACTIVITIES AND ADLS

## 2021-07-13 ASSESSMENT — PAIN DESCRIPTION - FREQUENCY: FREQUENCY: INTERMITTENT

## 2021-07-13 NOTE — PROGRESS NOTES
Internal Medicine Teaching Service Senior Note      This is a 63 y.o. male admitted 7/12/2021 for Congestive heart failure of unknown etiology ([facility] [I50.9]  Acute left-sided CHF (congestive heart failure) ([facility] [I50.1]. Patient came in with Chief complaint of   Chief Complaint   Patient presents with    Shortness of Breath     pt states x1 week.  Joint Swelling     pt states bilateral ankle swelling       See H&P of admitting/intern resident for more details. PMH/Home meds:   Chronic back pain-on Norco   Hypothyroidism-on Synthroid 75-last TSH 4.0 on current admission. ER Course  Afebrile, hemodynamically stable  On room air and saturating well  Labs reviewed  BMP unremarkable  proBNP 6000  Tropes elevated 72-67  LFTs within normal limits  Glucose slightly elevated  TSH 4.0  CBC unremarkable  Chest x-ray showed increased pulmonary vascular congestion suggestive of pulmonary edema    EKG and echo done  Patient received 20 of Lasix in the ER and sent to floor for further management. Exam  Resting comfortably, alert and awake, crackles noted. Bilateral lower extremity swelling noted. Assessment    Principal Problem: Aortic stenosis  Active Problems:    Low back pain    Hypothyroid    Congestive heart failure of unknown etiology ([facility]    Acute left-sided CHF (congestive heart failure) ([facility]    Smoking    Alcohol abuse  Resolved Problems:    * No resolved hospital problems. *        Plan     1. Follow-up on echo, cardiology consulted  2. CHF-unclear etiology-likely secondary to worsening aortic [id number] recommended. Continue Lasix IV for now. Follow-up on cardiology recommendations. 3. Start on aspirin, Lipitor, beta-blocker and lisinopril  4. We will avoid calcium channel blockers due to aortic stenosis  5. Chronic pain-Norco ordered, will get MRI of spine without contrast  6.  Elevated blood sugars-continue insulin sliding scale and hypoglycemia protocol  7. Hypothyroidism-continue Synthroid 75 MCG daily  8. Alcohol use disorder-folic acid, thiamine recommended. 9. Follow-up on HbA1c  10. PT OT to follow  11. Case management for discharge planning  12. Follow-up on DVT scans bilateral legs.     Nutrition/Diet:  Low-sodium diet    DVT Prophylaxis:  Muna Monzon MD  Internal Medicine Resident  [facility]  7/13/2021 12:40 AM

## 2021-07-13 NOTE — PROGRESS NOTES
Physical Therapy        Physical Therapy Cancel Note      DATE: 2021    NAME: Sal Real  MRN: [id number]   : 1960      Patient not seen this date for Physical Therapy due to:    Testing: MRI and dopplers ordered this date. PT will check back following results.        Electronically signed by Cindi Davalos PT on 2021 at 8:19 AM

## 2021-07-13 NOTE — PROGRESS NOTES
Occupational [address]  Occupational Therapy Not Seen Note    DATE: 2021  Name: Fabiola Giron  : 1960  MRN: [id number]    Patient not available for Occupational Therapy due to:    [x] Testing: BLE dopplers ordered, awaiting results. [] Hemodialysis    [] Blood Transfusion in Progress    []Refusal by Patient:    [] Surgery/Procedure:    [] Strict Bedrest    [] Sedation    [] Spine Precautions     [] Pt with medical decline and not appropriate for continued therapy services. Spoke with pt/family and OT services to be defered. [] Pt independent with functional mobility and functional tasks. Pt with no OT acute care needs at this time, will defer OT eval.    [] Other      Next Scheduled Treatment: Attempt in pm or  as appropriate.     Romel Tellez, OT OTR/L

## 2021-07-13 NOTE — PLAN OF CARE
Problem: Falls - Risk of:  Goal: Will remain free from falls  Description: Will remain free from falls  7/13/2021 1727 by Nolberto Diaz RN  Outcome: Ongoing  7/13/2021 1726 by Nolberto Diaz RN  Outcome: Ongoing  Goal: Absence of physical injury  Description: Absence of physical injury  7/13/2021 1727 by Nolberto Diaz RN  Outcome: Ongoing  7/13/2021 1726 by Nolberto Diaz RN  Outcome: Ongoing

## 2021-07-13 NOTE — CONSULTS
Attestation signed by      Attending Physician Statement:    I have discussed the care of  Lalita Tadeo , including pertinent history and exam findings, with the Cardiology fellow/resident. I have seen and examined the patient and the key elements of all parts of the encounter have been performed by me. I agree with the assessment, plan and orders as documented by the fellow/resident, after I modified exam findings and plan of treatments, and the final version is my approved version of the assessment. Additional Comments: Patient was seen and examined agree with below. He presented with acute congestive heart failure exacerbation. His echocardiogram is reviewed with an ejection fraction to be severely reduced with severe aortic stenosis with mean gradient of 43 mmHg. The patient never had cardiac work-up before. We will continue IV diuresis with 40 mg of IV Lasix twice daily with a close follow-up on intake output charting and basic metabolic profile. I discussed with him proceeding with coronary angiography left and right heart cath and aortic valve study. Benefits risks and alternatives were explained with him and he does agree to proceed. We will plan to do this tomorrow if he is able to lay flat. [facility] Cardiology Cardiology    Consult / H&P               Today's Date: 7/13/2021  Patient Name: Lalita Tadeo  Date of admission: 7/12/2021  9:19 AM  Patient's age: 63 y.o., 1960  Admission Dx: Congestive heart failure of unknown etiology ([facility] [I50.9]  Acute left-sided CHF (congestive heart failure) ([facility] [I50.1]    Reason for Consult:  Cardiac evaluation    Requesting Physician: Kenneth Nolasco MD    CHIEF COMPLAINT:  Shortness of breah    History Obtained From:  patient, electronic medical record    HISTORY OF PRESENT ILLNESS:       63 y.o.   male with past history of hypothyroidism present to the emergency department with lower limb swelling and shortness of breath for the last 10day. Normally it improves within few days but this time it doesn't. So he came in. Denies any chest pain, nausea, vomiting, abdominal pain, diaphoresis. Echocardiogram was done in the ER which show AS and low EF. We are consulted for low EF and AS    Hemodynamically he is stable. Lab show proBNP 6364, troponin 72> 67. Normal electrolytes, renal profile. TSH 4  No previous cardiac available     Past Medical History:   has a past medical history of Osteoarthritis. Past Surgical History:   has no past surgical history on file. Home Medications:    Prior to Admission medications    Medication Sig Start Date End Date Taking? Authorizing Provider   HYDROcodone-acetaminophen (NORCO) 7.5-325 MG per tablet Take 1 tablet by mouth every 6 hours as needed for Pain.    Yes Historical Provider, MD   meloxicam (MOBIC) 7.5 MG tablet TAKE 1 TAB BY MOUTH ONCE A DAY 4/13/21  Yes Meredith Arambula MD   levothyroxine (SYNTHROID) 75 MCG tablet TAKE 1 TAB BY MOUTH ONCE A DAY 4/13/21  Yes Meredith Seen, MD      Current Facility-Administered Medications: insulin lispro (HUMALOG) injection vial 0-6 Units, 0-6 Units, Subcutaneous, TID WC  insulin lispro (HUMALOG) injection vial 0-3 Units, 0-3 Units, Subcutaneous, Nightly  glucose (GLUTOSE) 40 % oral gel 15 g, 15 g, Oral, PRN  dextrose 50 % IV solution, 12.5 g, Intravenous, PRN  glucagon (rDNA) injection 1 mg, 1 mg, Intramuscular, PRN  dextrose 5 % solution, 100 mL/hr, Intravenous, PRN  aspirin chewable tablet 81 mg, 81 mg, Oral, Daily  atorvastatin (LIPITOR) tablet 20 mg, 20 mg, Oral, Nightly  levothyroxine (SYNTHROID) tablet 75 mcg, 75 mcg, Oral, Daily  sodium chloride flush 0.9 % injection 5-40 mL, 5-40 mL, Intravenous, 2 times per day  sodium chloride flush 0.9 % injection 5-40 mL, 5-40 mL, Intravenous, PRN  0.9 % sodium chloride infusion, 25 mL, Intravenous, PRN  enoxaparin (LOVENOX) injection 40 mg, 40 mg, Subcutaneous, Daily  ondansetron (ZOFRAN-ODT) disintegrating tablet 4 mg, 4 mg, Oral, Q8H PRN **OR** ondansetron (ZOFRAN) injection 4 mg, 4 mg, Intravenous, Q6H PRN  polyethylene glycol (GLYCOLAX) packet 17 g, 17 g, Oral, Daily PRN  acetaminophen (TYLENOL) tablet 650 mg, 650 mg, Oral, Q6H PRN **OR** acetaminophen (TYLENOL) suppository 650 mg, 650 mg, Rectal, Q6H PRN  furosemide (LASIX) injection 20 mg, 20 mg, Intravenous, Daily  HYDROcodone-acetaminophen (NORCO) 5-325 MG per tablet 1 tablet, 1 tablet, Oral, Q6H PRN  thiamine mononitrate tablet 100 mg, 100 mg, Oral, Daily  folic acid (FOLVITE) tablet 1 mg, 1 mg, Oral, Daily    Allergies:  Patient has no known allergies. Social History:   reports that he has been smoking. He has a 10.00 pack-year smoking history. He has never used smokeless tobacco. He reports current alcohol use. He reports that he does not use drugs. Family History: family history is not on file. No h/o sudden cardiac death. No for premature CAD    REVIEW OF SYSTEMS:    · Constitutional: there has been no unanticipated weight loss. There's been No change in energy level, No change in activity level. · Eyes: No visual changes or diplopia. No scleral icterus. · ENT: No Headaches  · Cardiovascular: No cardiac history. · Respiratory: No previous pulmonary problems, No cough  · Gastrointestinal: No abdominal pain. No change in bowel or bladder habits. · Genitourinary: No dysuria, trouble voiding, or hematuria. · Musculoskeletal:  No gait disturbance, No weakness or joint complaints. · Integumentary: No rash or pruritis. · Neurological: No headache, diplopia, change in muscle strength, numbness or tingling. No change in gait, balance, coordination, mood, affect, memory, mentation, behavior. · Psychiatric: No anxiety, or depression. · Endocrine: No temperature intolerance. No excessive thirst, fluid intake, or urination. No tremor.   · Hematologic/Lymphatic: No abnormal bruising or bleeding, blood clots or swollen lymph nodes.  · Allergic/Immunologic: No nasal congestion or hives. PHYSICAL EXAM:      /78   Pulse 62   Temp 98 °F (36.7 °C) (Oral)   Resp 20   Ht 5' 10\" (1.778 m)   Wt 194 lb 10.7 oz (88.3 kg)   SpO2 95%   BMI 27.93 kg/m²    Constitutional and General Appearance: alert, cooperative, no distress and appears stated age  HEENT: PERRL, no cervical lymphadenopathy. No masses palpable. Normal oral mucosa  Respiratory:  · Normal excursion and expansion without use of accessory muscles  Resp Auscultation: Good respiratory effort. No for increased work of breathing. On auscultation: crackles bilateral   Cardiovascular:  · The apical impulse is not displaced  · Heart tones are crisp and normal. regular S1 and S2. Systolic murmur  · Jugular venous pulsation Normal  · The carotid upstroke is normal in amplitude and contour without delay or bruit  · Peripheral pulses are symmetrical and full   Abdomen:   · No masses or tenderness  · Bowel sounds present  Extremities:  ·  No Cyanosis or Clubbing  ·  Lower extremity edema: yes  ·  Skin: Warm and dry  Neurological:  · Alert and oriented. · Moves all extremities well  · No abnormalities of mood, affect, memory, mentation, or behavior are noted    DATA:    Diagnostics:    EKG: Normal sinus rhythm  Left atrial enlargement  Left axis deviation  Left ventricular hypertrophy with QRS widening and repolarization abnormality  Cannot rule out Anteroseptal infarct , age undetermined  Abnormal ECG  No previous ECGs available  ECHO: obtained and reviewed. Stress Test: not obtained. Cardiac Angiography: not obtained. Labs:     CBC:   Recent Labs     07/12/21  1002 07/13/21  0533   WBC 6.2 5.7   HGB 16.0 15.5   HCT 49.3 48.6    184     BMP:   Recent Labs     07/12/21  1002 07/13/21  0533    137   K 4.3 4.3   CO2 23 24   BUN 23 21   CREATININE 0.72 0.89   LABGLOM >60 >60   GLUCOSE 113* 97     BNP: No results for input(s): BNP in the last 72 hours.   PT/INR: No results for input(s): PROTIME, INR in the last 72 hours. APTT:No results for input(s): APTT in the last 72 hours. CARDIAC ENZYMES:No results for input(s): CKTOTAL, CKMB, CKMBINDEX, TROPONINI in the last 72 hours. FASTING LIPID PANEL:  Lab Results   Component Value Date    HDL 36 07/13/2021    LDLCALC 90 09/17/2019    TRIG 74 07/13/2021     LIVER PROFILE:  Recent Labs     07/12/21  1002   AST 34   ALT 26   LABALBU 3.6       IMPRESSION:    1. Acute exacerbation of congestive heart failure   2. Aortic steonsis  3. dyspnea  4. Bilateral pedal edema  5. Daily smoker  6. Hypothyroidism- TSH 4.0  7. Chronic back pain        RECOMMENDATIONS:  1. Follow up on echo. 2. Continue ASA, Lipitor and IV lasix   3. Monitor I and O, fluid restriction   4. We will cardiac cath him once he is medically stabilize. Plan need to be discuss with Dr Kadie Peacock      Discussed with patient.     Electronically signed by Sylvie Pitts MD on 7/13/2021 at 9:43 [address] [place] [address] [facility]      [phone]

## 2021-07-13 NOTE — FLOWSHEET NOTE
Assessment: Patient is a 63 y.o. male who was admitted to the hospital due to \"aortic stenosis. \" Patient was standing in hospital room, beside hospital bed, when  visited. Intervention:  visited patient per initial rounding visits.  introduced herself to patient and learned about his well-being. Patient shared that he has been [facility] difficulty breathing. \" Patient shared that his daughter visited with him today. Patient shared that he lives with his son, who is currently at home caring for patient's pets. Patient indicated that he is coping well and \"feeling better,\" tonight.  provided support and hospitality to patient. Outcome: Patient was receptive of 's visit and support. Plan: Chaplains can make follow-up visit, per request. Chapo Zuniga can be reached 24/7 via [employer]. [facility]      07/12/21 0589   Encounter Summary   Services provided to: Patient   Referral/Consult From: [facility]   (7/12/2021)   Complexity of Encounter Low   Length of Encounter 15 minutes   Spiritual Assessment Completed Yes   Routine   Type Initial   Spiritual/Shinto   Type Spiritual support   Assessment Calm; Approachable;Coping   Intervention Sustaining presence/ Ministry of presence   Outcome Receptive

## 2021-07-13 NOTE — PROGRESS NOTES
[facility]  Internal Medicine Teaching Residency Program  Inpatient Daily Progress Note  ______________________________________________________________________________    Patient: Deandra Sanchez  YOB: 1960   EBN:[id number]    Acct: [de-identified]     Room: [address] [place] [address]  Admit date: 2021  Today's date: 21  Number of days in the hospital: 1    SUBJECTIVE   Admitting Diagnosis: Aortic stenosis  CC: SOB  Pt examined at bedside. Chart & results reviewed. Patient is eating ok, sleeping ok, normal bowel/ bladder movements. Patient is able to ambulate. Patient on NC      ROS:  Constitutional:  negative for chills, fevers, sweats  Respiratory:  SOB  Cardiovascular:  CP on exertion  Gastrointestinal:  negative for abdominal pain, constipation, diarrhea, nausea, vomiting  Neurological:  negative for dizziness, headache    BRIEF HISTORY     The patient is a pleasant 63 y.o. male past medical history of recent hip injury, osteoarthritis, hypothyroidism presents with a chief complaint of  shortness of breath and lower leg swelling for the past 10 days. Patient first noticed during his physical therapy sessions while his therapist noticed swelling. Noticed that his legs have looked increasingly more swollen and regular. Reports having chest pain whenever he exerts himself shortness of breath is noted whenever he lays down echo performed showed 10% EF was essentially ruled stenosis. At bedside patient reports swelling has gone down. No chest pain. Is able to breath easier. OBJECTIVE     Vital Signs:  /78   Pulse 62   Temp 98 °F (36.7 °C) (Oral)   Resp 20   Ht 5' 10\" (1.778 m)   Wt 194 lb 10.7 oz (88.3 kg)   SpO2 95%   BMI 27.93 kg/m²     Temp (24hrs), Av.5 °F (36.4 °C), Min:97 °F (36.1 °C), Max:98 °F (36.7 °C)    In: -   Out: 2475 [Urine:2475]    Physical Exam:  Physical Exam  Constitutional:       Appearance: Normal appearance.    HENT: Head: Normocephalic and atraumatic. Nose: Nose normal.   Cardiovascular:      Rate and Rhythm: Normal rate and regular rhythm. Pulses: Normal pulses. Heart sounds: Murmur heard. Pulmonary:      Effort: Pulmonary effort is normal.      Breath sounds: Normal breath sounds. Abdominal:      General: Bowel sounds are normal. There is no distension. Palpations: Abdomen is soft. Tenderness: There is no abdominal tenderness. Musculoskeletal:         General: Normal range of motion. Cervical back: Normal range of motion and neck supple. Skin:     General: Skin is warm and dry. Neurological:      General: No focal deficit present. Mental Status: He is alert and oriented to person, place, and time. Mental status is at baseline.    Psychiatric:         Mood and Affect: Mood normal.         Behavior: Behavior normal.           Medications:  Scheduled Medications:    insulin lispro  0-6 Units Subcutaneous TID WC    insulin lispro  0-3 Units Subcutaneous Nightly    aspirin  81 mg Oral Daily    atorvastatin  20 mg Oral Nightly    levothyroxine  75 mcg Oral Daily    sodium chloride flush  5-40 mL Intravenous 2 times per day    enoxaparin  40 mg Subcutaneous Daily    furosemide  20 mg Intravenous Daily    thiamine mononitrate  100 mg Oral Daily    folic acid  1 mg Oral Daily     Continuous Infusions:    dextrose      sodium chloride       PRN Medicationsglucose, 15 g, PRN  dextrose, 12.5 g, PRN  glucagon (rDNA), 1 mg, PRN  dextrose, 100 mL/hr, PRN  sodium chloride flush, 5-40 mL, PRN  sodium chloride, 25 mL, PRN  ondansetron, 4 mg, Q8H PRN   Or  ondansetron, 4 mg, Q6H PRN  polyethylene glycol, 17 g, Daily PRN  acetaminophen, 650 mg, Q6H PRN   Or  acetaminophen, 650 mg, Q6H PRN  HYDROcodone-acetaminophen, 1 tablet, Q6H PRN        Diagnostic Labs:  CBC:   Recent Labs     07/12/21  1002   WBC 6.2   RBC 4.98   HGB 16.0   HCT 49.3   MCV 99.0   RDW 15.8*        BMP: Pau Rivera  7/13/2021 5:47 AM       Attending Supervising Physicians Attestation Statement  I have seen and examined Fabiola Giron and the cobos elements of all parts of the encounter have been performed by me. I agree with the assessment, plan and orders as documented by the Advanced Practice Provider. I discussed the findings and plans with the resident physician and agree as documented in their note . In addition to the pertinent positives and negatives as stated within HPI and the review of systems as documented in the notes, all other systems were reviewed when able to and are reported negative. Additional Comments: ECHO demonstrating low EF.  Cardiology consulted, planning cath     Electronically signed by Marge Carballo MD on 7/13/2021 at 2:31 PM

## 2021-07-14 ENCOUNTER — APPOINTMENT (OUTPATIENT)
Dept: CARDIAC CATH/INVASIVE PROCEDURES | Age: 61
DRG: 192 | End: 2021-07-14
Payer: MEDICAID

## 2021-07-14 LAB
ABSOLUTE EOS #: 0.12 K/UL (ref 0–0.44)
ABSOLUTE IMMATURE GRANULOCYTE: <0.03 K/UL (ref 0–0.3)
ABSOLUTE LYMPH #: 1.46 K/UL (ref 1.1–3.7)
ABSOLUTE MONO #: 0.39 K/UL (ref 0.1–1.2)
ANION GAP SERPL CALCULATED.3IONS-SCNC: 11 MMOL/L (ref 9–17)
BASOPHILS # BLD: 1 % (ref 0–2)
BASOPHILS ABSOLUTE: 0.06 K/UL (ref 0–0.2)
BUN BLDV-MCNC: 20 MG/DL (ref 8–23)
BUN/CREAT BLD: ABNORMAL (ref 9–20)
CALCIUM SERPL-MCNC: 10 MG/DL (ref 8.6–10.4)
CHLORIDE BLD-SCNC: 98 MMOL/L (ref 98–107)
CO2: 25 MMOL/L (ref 20–31)
CREAT SERPL-MCNC: 1.02 MG/DL (ref 0.7–1.2)
DIFFERENTIAL TYPE: ABNORMAL
EOSINOPHILS RELATIVE PERCENT: 2 % (ref 1–4)
GFR AFRICAN AMERICAN: >60 ML/MIN
GFR NON-AFRICAN AMERICAN: >60 ML/MIN
GFR SERPL CREATININE-BSD FRML MDRD: ABNORMAL ML/MIN/{1.73_M2}
GFR SERPL CREATININE-BSD FRML MDRD: ABNORMAL ML/MIN/{1.73_M2}
GLUCOSE BLD-MCNC: 88 MG/DL (ref 70–99)
HCT VFR BLD CALC: 56.8 % (ref 40.7–50.3)
HEMOGLOBIN: 18.2 G/DL (ref 13–17)
IMMATURE GRANULOCYTES: 0 %
LYMPHOCYTES # BLD: 23 % (ref 24–43)
MCH RBC QN AUTO: 32.1 PG (ref 25.2–33.5)
MCHC RBC AUTO-ENTMCNC: 32 G/DL (ref 28.4–34.8)
MCV RBC AUTO: 100.2 FL (ref 82.6–102.9)
MONOCYTES # BLD: 6 % (ref 3–12)
NRBC AUTOMATED: 0 PER 100 WBC
PDW BLD-RTO: 15.6 % (ref 11.8–14.4)
PLATELET # BLD: 199 K/UL (ref 138–453)
PLATELET ESTIMATE: ABNORMAL
PMV BLD AUTO: 11 FL (ref 8.1–13.5)
POTASSIUM SERPL-SCNC: 4.7 MMOL/L (ref 3.7–5.3)
RBC # BLD: 5.67 M/UL (ref 4.21–5.77)
RBC # BLD: ABNORMAL 10*6/UL
SEG NEUTROPHILS: 68 % (ref 36–65)
SEGMENTED NEUTROPHILS ABSOLUTE COUNT: 4.3 K/UL (ref 1.5–8.1)
SODIUM BLD-SCNC: 134 MMOL/L (ref 135–144)
WBC # BLD: 6.4 K/UL (ref 3.5–11.3)
WBC # BLD: ABNORMAL 10*3/UL

## 2021-07-14 PROCEDURE — 1200000000 HC SEMI PRIVATE

## 2021-07-14 PROCEDURE — C1894 INTRO/SHEATH, NON-LASER: HCPCS

## 2021-07-14 PROCEDURE — C1751 CATH, INF, PER/CENT/MIDLINE: HCPCS

## 2021-07-14 PROCEDURE — 6360000002 HC RX W HCPCS: Performed by: STUDENT IN AN ORGANIZED HEALTH CARE EDUCATION/TRAINING PROGRAM

## 2021-07-14 PROCEDURE — 6360000002 HC RX W HCPCS: Performed by: INTERNAL MEDICINE

## 2021-07-14 PROCEDURE — 6370000000 HC RX 637 (ALT 250 FOR IP): Performed by: STUDENT IN AN ORGANIZED HEALTH CARE EDUCATION/TRAINING PROGRAM

## 2021-07-14 PROCEDURE — 2500000003 HC RX 250 WO HCPCS

## 2021-07-14 PROCEDURE — 6360000002 HC RX W HCPCS

## 2021-07-14 PROCEDURE — 2709999900 HC NON-CHARGEABLE SUPPLY

## 2021-07-14 PROCEDURE — 80048 BASIC METABOLIC PNL TOTAL CA: CPT

## 2021-07-14 PROCEDURE — 99232 SBSQ HOSP IP/OBS MODERATE 35: CPT | Performed by: INTERNAL MEDICINE

## 2021-07-14 PROCEDURE — 85025 COMPLETE CBC W/AUTO DIFF WBC: CPT

## 2021-07-14 PROCEDURE — 36415 COLL VENOUS BLD VENIPUNCTURE: CPT

## 2021-07-14 PROCEDURE — 97530 THERAPEUTIC ACTIVITIES: CPT

## 2021-07-14 PROCEDURE — 6370000000 HC RX 637 (ALT 250 FOR IP): Performed by: INTERNAL MEDICINE

## 2021-07-14 PROCEDURE — 97162 PT EVAL MOD COMPLEX 30 MIN: CPT

## 2021-07-14 PROCEDURE — 2580000003 HC RX 258: Performed by: STUDENT IN AN ORGANIZED HEALTH CARE EDUCATION/TRAINING PROGRAM

## 2021-07-14 RX ORDER — MIDAZOLAM HYDROCHLORIDE 2 MG/2ML
1 INJECTION, SOLUTION INTRAMUSCULAR; INTRAVENOUS ONCE
Status: COMPLETED | OUTPATIENT
Start: 2021-07-14 | End: 2021-07-14

## 2021-07-14 RX ORDER — FENTANYL CITRATE 50 UG/ML
25 INJECTION, SOLUTION INTRAMUSCULAR; INTRAVENOUS ONCE
Status: COMPLETED | OUTPATIENT
Start: 2021-07-14 | End: 2021-07-14

## 2021-07-14 RX ADMIN — MIDAZOLAM HYDROCHLORIDE 1 MG: 1 INJECTION, SOLUTION INTRAMUSCULAR; INTRAVENOUS at 13:20

## 2021-07-14 RX ADMIN — HYDROCODONE BITARTRATE AND ACETAMINOPHEN 1 TABLET: 5; 325 TABLET ORAL at 08:15

## 2021-07-14 RX ADMIN — ATORVASTATIN CALCIUM 20 MG: 20 TABLET, FILM COATED ORAL at 20:40

## 2021-07-14 RX ADMIN — Medication 100 MG: at 08:14

## 2021-07-14 RX ADMIN — SODIUM CHLORIDE, PRESERVATIVE FREE 10 ML: 5 INJECTION INTRAVENOUS at 20:40

## 2021-07-14 RX ADMIN — ASPIRIN 81 MG: 81 TABLET, CHEWABLE ORAL at 08:14

## 2021-07-14 RX ADMIN — SODIUM CHLORIDE, PRESERVATIVE FREE 10 ML: 5 INJECTION INTRAVENOUS at 08:13

## 2021-07-14 RX ADMIN — LEVOTHYROXINE SODIUM 75 MCG: 75 TABLET ORAL at 07:18

## 2021-07-14 RX ADMIN — FUROSEMIDE 40 MG: 10 INJECTION, SOLUTION INTRAMUSCULAR; INTRAVENOUS at 08:14

## 2021-07-14 RX ADMIN — FENTANYL CITRATE 25 MCG: 50 INJECTION, SOLUTION INTRAMUSCULAR; INTRAVENOUS at 13:20

## 2021-07-14 RX ADMIN — FUROSEMIDE 40 MG: 10 INJECTION, SOLUTION INTRAMUSCULAR; INTRAVENOUS at 17:13

## 2021-07-14 RX ADMIN — FOLIC ACID 1 MG: 1 TABLET ORAL at 08:14

## 2021-07-14 ASSESSMENT — PAIN SCALES - GENERAL
PAINLEVEL_OUTOF10: 5
PAINLEVEL_OUTOF10: 7
PAINLEVEL_OUTOF10: 6

## 2021-07-14 ASSESSMENT — PAIN DESCRIPTION - DESCRIPTORS: DESCRIPTORS: ACHING

## 2021-07-14 ASSESSMENT — PAIN DESCRIPTION - ORIENTATION: ORIENTATION: LEFT

## 2021-07-14 ASSESSMENT — PAIN DESCRIPTION - LOCATION: LOCATION: HIP

## 2021-07-14 ASSESSMENT — PAIN DESCRIPTION - PAIN TYPE: TYPE: ACUTE PAIN

## 2021-07-14 NOTE — CARE COORDINATION
Transitional planning. LESA for [employer] to call CM back concerning pt questions on Medicaid application. 1130 Spoke to Scott Munguia with [employer]. Celina Yoon started process for Medicaid application. Pt only needs to provide income verification for  April, May and June and provide a statement from employer with pt's last day of work. Scott Munguia stated Celina Yoon should have left envelope for pt to mail this information back to her. Pt can call Celina Yoon @ [phone] Provided this information to pt. Pt states he did not receive envelope. LM for [employer] to bring pt envelope tomorrow.

## 2021-07-14 NOTE — PLAN OF CARE
Problem: Falls - Risk of:  Goal: Will remain free from falls  Description: Will remain free from falls  7/14/2021 1531 by Alondra Fleming RN  Outcome: Ongoing  7/14/2021 0252 by Raymundo Chatterjee RN  Outcome: Ongoing  Goal: Absence of physical injury  Description: Absence of physical injury  7/14/2021 1531 by Alondra Fleming RN  Outcome: Ongoing  7/14/2021 0252 by Raymundo Chatterjee RN  Outcome: Ongoing     Problem: Pain:  Goal: Pain level will decrease  Description: Pain level will decrease  Outcome: Ongoing  Goal: Control of acute pain  Description: Control of acute pain  Outcome: Ongoing  Goal: Control of chronic pain  Description: Control of chronic pain  Outcome: Ongoing

## 2021-07-14 NOTE — PROGRESS NOTES
Physical Therapy    Facility/Department: [facility] RENAL//MED SURG  Initial Assessment    NAME: Lalita Tadeo  : 1960  MRN: [id number]    Date of Service: 2021  The patient is a pleasant 63 y.o. male past medical history of recent hip injury, osteoarthritis, hypothyroidism presents with a chief complaint of   Shortness of breath and lower leg swelling for the past 10 days. Pt had BLE dopplers done and was found negative of DVT. Discharge Recommendations:    Further therapy recommended at discharge. PT Equipment Recommendations  Equipment Needed: No    Assessment    Pt lying in bed upon PT arrival. Pt very pleasant and cooperative throughout. Pt IND bed mob, supervision-SBA transfers, and CGA amb 150' w/ single point cane. Pt demonstrated decreased endurance and experienced SOB during ambulation. Pt ambulates w/ limp d/t one leg being shorter than the other. Pt would benefit from further therapy in order to address deficits. Body structures, Functions, Activity limitations: Decreased functional mobility ; Decreased strength;Decreased balance;Decreased endurance  Specific instructions for Next Treatment: attempt stairs if appropriate  Prognosis: Good  Decision Making: Medium Complexity  PT Education: Goals;PT Role;Plan of Care;General Safety;Precautions  REQUIRES PT FOLLOW UP: Yes  Activity Tolerance  Activity Tolerance: Patient limited by endurance; Patient Tolerated treatment well       Patient Diagnosis(es): The encounter diagnosis was Congestive heart failure, unspecified HF chronicity, unspecified heart failure type ([address] has a past medical history of Osteoarthritis. has no past surgical history on file.     Restrictions  Restrictions/Precautions  Restrictions/Precautions: General Precautions, Up as Tolerated  Required Braces or Orthoses?: No  Vision/Hearing  Vision: Impaired  Vision Exceptions: Wears glasses for reading  Hearing: Within functional limits     Subjective  General  Patient assessed for rehabilitation services?: Yes  Response To Previous Treatment: Not applicable  Family / Caregiver Present: No  Follows Commands: Within Functional Limits  Pain Screening  Patient Currently in Pain: Yes  Pain Assessment  Pain Assessment: 0-10  Pain Level: 7  Pain Type: Acute pain  Pain Location: Hip  Pain Orientation: Left  Pain Descriptors: Aching  Vital Signs  Patient Currently in Pain: Yes  Pre Treatment Pain Screening  Intervention List: Nurse called to administer meds; Patient able to continue with treatment    Orientation  Orientation  Overall Orientation Status: Within Functional Limits  Social/Functional History  Social/Functional History  Lives With: Son  Type of Home: House  Home Layout: Two level, Laundry in basement, Bed/Bath upstairs  Home Access: Stairs to enter with rails  Entrance Stairs - Number of Steps: 4 (4 steps inside house to main floor; 10 steps to 1st landing going upstairs; 5 steps to 2nd landing where bedroom is)  Entrance Stairs - Rails: Right  Home Equipment: Cane (uses cane at all times)  Ambulation Assistance: Independent  Transfer Assistance: Independent  Active : Yes  Occupation: Full time employment  Type of occupation: takes out trash; on feet all day  Additional Comments: Pt states son works 8 hrs/day for [employer]    Objective     Observation/Palpation  Posture: Good    AROM RLE (degrees)  RLE AROM: WFL  AROM LLE (degrees)  LLE AROM : WFL  AROM RUE (degrees)  RUE AROM : WFL  AROM LUE (degrees)  LUE AROM : WFL  Strength RLE  Strength RLE: WFL  Strength LLE  Strength LLE: Exception (hip 4/5; knee distal WFL)  Strength RUE  Strength RUE: WFL  Strength LUE  Strength LUE: WFL  Tone RLE  RLE Tone: Normotonic  Tone LLE  LLE Tone: Normotonic  Motor Control  Gross Motor?: WFL  Sensation  Overall Sensation Status: WFL (pt denies no numbness/tingling)  Bed mobility  Supine to Sit: Independent  Scooting: Independent  Transfers  Sit to Stand: Supervision  Stand to sit: Supervision  Bed to Chair: Stand by assistance  Ambulation  Ambulation?: Yes  Ambulation 1  Surface: level tile  Device: Single point cane  Assistance: Contact guard assistance  Gait Deviations: Slow Katherine;Decreased step length;Decreased step height  Distance: 150' x 1  Comments: Pt states SOB during ambulating; no LOB noted     Balance  Posture: Good  Sitting - Static: Good  Sitting - Dynamic: Good  Standing - Static: Good;-  Standing - Dynamic: [address]  Times per week: 5-6 visits weekly  Times per day: Daily  Specific instructions for Next Treatment: attempt stairs if appropriate  Current Treatment Recommendations: Functional Mobility Training, Balance Training, Strengthening, Transfer Training, Stair training, Gait Training, Endurance Training  Safety Devices  Type of devices: Call light within reach, Gait belt, Left in chair, Patient at risk for falls  Restraints  Initially in place: No    AM-PAC Score  AM-PAC Inpatient Mobility Raw Score : 21 (07/14/21 0856)  AM-PAC Inpatient T-Scale Score : 50.25 (07/14/21 0856)  Mobility Inpatient CMS 0-100% Score: 28.97 (07/14/21 0856)  Mobility Inpatient CMS G-Code Modifier : Medina Overton (07/14/21 0856)          Goals  Short term goals  Time Frame for Short term goals: 12 visits  Short term goal 1: Perform transfers IND  Short term goal 2: Amb 500' w/ single point cane IND  Short term goal 3: Negotiate 15 steps w/ R rail IND  Patient Goals   Patient goals : Go home       Therapy Time   Individual Concurrent Group Co-treatment   Time In 0813         Time Out 0838         Minutes 25         Timed Code Treatment Minutes: 15 Minutes       CAROLINA Long    This treatment/evaluation completed by signing SPT. Signing PT agrees with treatment and documentation.

## 2021-07-14 NOTE — PLAN OF CARE
Patient was brought to the cath lab multiple times for cardiac cath, however he was unable to tolerate laying flat and despite discussion, he refused to proceed and hence the procedure was cancelled.

## 2021-07-14 NOTE — PROGRESS NOTES
[facility]   Progress Note                   Date:   7/14/2021  Patient name: Jessica Calle  Date of admission:  7/12/2021  9:19 AM  MRN:   [id number]  YOB: 1960  PCP: Roxana Rivera MD    Reason for Admission: Congestive heart failure of unknown etiology ([facility] [I50.9]  Acute left-sided CHF (congestive heart failure) ([facility] [I50.1]    Subjective:       Clinical Changes / Abnormalities: Patient seen and examined while sitting up in chair. No new acute events overnight. Patient is doing well, has no complaints. Denies chest pain. States that breathing has improved significantly since admission. Up ambulating in hallway this morning with some mild exertional dyspnea. Did not require oxygen use. Patient states he should be able to lay flat for 30 minutes without difficulty. Reviewed vitals, labs, tele, & previous testing. Patient ate breakfast this morning. Medications:   Scheduled Meds:   aspirin  81 mg Oral Daily    atorvastatin  20 mg Oral Nightly    furosemide  40 mg Intravenous BID    levothyroxine  75 mcg Oral Daily    sodium chloride flush  5-40 mL Intravenous 2 times per day    enoxaparin  40 mg Subcutaneous Daily    thiamine mononitrate  100 mg Oral Daily    folic acid  1 mg Oral Daily     Continuous Infusions:   dextrose      sodium chloride       CBC:   Recent Labs     07/12/21  1002 07/13/21  0533 07/14/21  0501   WBC 6.2 5.7 6.4   HGB 16.0 15.5 18.2*    184 199     BMP:    Recent Labs     07/12/21  1002 07/13/21  0533 07/14/21  0501    137 134*   K 4.3 4.3 4.7    105 98   CO2 23 24 25   BUN 23 21 20   CREATININE 0.72 0.89 1.02   GLUCOSE 113* 97 88     Hepatic:   Recent Labs     07/12/21  1002   AST 34   ALT 26   BILITOT 0.91   ALKPHOS 135*     Troponin:   Recent Labs     07/12/21  1002 07/12/21  1125   TROPHS 72* 67*     BNP: No results for input(s): BNP in the last 72 hours.   Lipids:   Recent Labs     07/13/21  0533   CHOL 131   HDL 36*     INR: No results for input(s): INR in the last 72 hours. Objective:   Vitals: /71   Pulse 65   Temp 97.4 °F (36.3 °C) (Oral)   Resp 18   Ht 5' 10\" (1.778 m)   Wt 173 lb (78.5 kg)   SpO2 96%   BMI 24.82 kg/m²   General appearance: alert and cooperative with exam  HEENT: Head: Normocephalic, no lesions, without obvious abnormality. Neck: no JVD, trachea midline, no adenopathy  Lungs: Clear to auscultation throughout. Room air without distress. Heart: Regular rate and rhythm, s1/s2 auscultated, no murmurs. SR on tele. Abdomen: soft, non-tender, bowel sounds active  Extremities: 1-2+ LE edema R>L  Neurologic: not done    EKG: Normal sinus rhythm  Left atrial enlargement  Left axis deviation  Left ventricular hypertrophy with QRS widening and repolarization abnormality  Cannot rule out Anteroseptal infarct , age undetermined  Abnormal ECG  No previous ECGs available    Echo 7/12/2021  Summary  Left ventricle is enlarged. Global left ventricular systolic function is  severely reduced. Estimated ejection fraction is 20 % . Calculated EF via 3D Heart Model is 22 %. Moderate left ventricular hypertrophy. Severe global hypokinesis. Grade III (severe) left ventricular diastolic dysfunction. Left atrium is severely dilated. Right atrial dilatation. The aortic valve is severely calcified with restricted mobility. The aortic  valve appears possibly bicuspid but difficult to determine. Severe (possibly  critical) aortic stenosis is noted. Velocities may be under estimated due to  reduced systolic function. Mean gradient of 43 mmHg is noted. Dimensionless index 0.08, CHERYL 0.33 cm2. Mild aortic insufficiency. Thickened mitral valve leaflets. Mild mitral regurgitation. Mild tricuspid regurgitation. Estimated right ventricular systolic pressure  is 36 mmHg. Trivial pulmonic insufficiency. Assessment / Acute Cardiac Problems:   1. Acute exacerbation of congestive heart failure   2.  Aortic steonsis  3. dyspnea  4. Bilateral pedal edema  5. Daily smoker  6. Hypothyroidism- TSH 4.0  7. Chronic back pain      Patient Active Problem List:     Sciatica     Low back pain     Gout     Other ankle sprain and strain     Diabetic macular edema (HCC)     Other testicular hypofunction     Generalized osteoarthrosis, unspecified site     Enthesopathy     Hypothyroid     Congestive heart failure of unknown etiology ([facility]     Acute left-sided CHF (congestive heart failure) (HCC)     Smoking     Aortic stenosis     Alcohol abuse     Congestive heart failure ([facility]      Plan of Treatment:   1. Echo as noted above with significantly reduced LVEF of 20-22%. Discussed findings with patient. Recommend Cardiac Cath and aortic valve studies to further evaluate. 2. Patient on room air and states able to lay flat without difficulty. Will proceed with cardiac cath and evaluation of Aortic valve today. I have discussed risks (including but not limited to vascular injury, infection, hematoma, contrast induced kidney dysfunction, CVA and MI), benefits, alternatives in detail. All questions answered. Patient agrees to proceed. 3. HFrEF. Negative -10 liters since admission. BP stable. SOB Significantly improved. Patient on room air without distress. Continues to have 1+ pitting edema to lower extremities R>L. Will order compression socks. Continue IV lasix with plans to switch to po tomorrow. 4. Further orders pending cardiac cath findings.          Electronically signed by WILLIAM Collado CNP on 7/14/2021 at 9:43 [facility]      [phone]

## 2021-07-14 NOTE — PROGRESS NOTES
[facility]  Internal Medicine Teaching Residency Program  Inpatient Daily Progress Note  ______________________________________________________________________________    Patient: Shayy Charles  YOB: 1960   [id number]    Acct: [de-identified]     Room: [address] [place] [address]  Admit date: 2021  Today's date: 21  Number of days in the hospital: 2    SUBJECTIVE   Admitting Diagnosis: Aortic stenosis  CC: SOB  Pt examined at bedside. Chart & results reviewed. Patient is eating ok, sleeping ok, normal bowel/ bladder movements. No fevers, chills, rigors, chest pain,shortness of breath, cough, abdominal pain, nausea, vomiting, diarrhea, dysuria or increased frequency of micturation. ROS:  Constitutional:  negative for chills, fevers, sweats  Respiratory:  SOB  Cardiovascular:  CP on exertion  Gastrointestinal:  negative for abdominal pain, constipation, diarrhea, nausea, vomiting  Neurological:  negative for dizziness, headache    BRIEF HISTORY     The patient is a pleasant 63 y.o. male past medical history of recent hip injury, osteoarthritis, hypothyroidism presents with a chief complaint of  shortness of breath and lower leg swelling for the past 10 days. Patient first noticed during his physical therapy sessions while his therapist noticed swelling. Noticed that his legs have looked increasingly more swollen and regular. Reports having chest pain whenever he exerts himself shortness of breath is noted whenever he lays down echo performed showed 10% EF was essentially ruled stenosis. At bedside patient reports swelling has gone down. No chest pain. Is able to breath easier.      OBJECTIVE     Vital Signs:  /71   Pulse 65   Temp 97.4 °F (36.3 °C) (Oral)   Resp 18   Ht 5' 10\" (1.778 m)   Wt 173 lb (78.5 kg)   SpO2 96%   BMI 24.82 kg/m²     Temp (24hrs), Av.6 °F (36.4 °C), Min:97.4 °F (36.3 °C), Max:97.8 °F (36.6 °C)    In: 480   Out: [address] [place] [address]    Physical Exam:  Physical Exam  Constitutional:       Appearance: Normal appearance. HENT:      Head: Normocephalic and atraumatic. Nose: Nose normal.      Mouth/Throat:      Mouth: Mucous membranes are moist.   Eyes:      Extraocular Movements: Extraocular movements intact. Pupils: Pupils are equal, round, and reactive to light. Cardiovascular:      Rate and Rhythm: Normal rate and regular rhythm. Pulses: Normal pulses. Heart sounds: Murmur (holosystolic radiating to carotids b/l) heard. Pulmonary:      Effort: Pulmonary effort is normal.      Breath sounds: Normal breath sounds. No rales. Abdominal:      General: Bowel sounds are normal. There is no distension. Palpations: Abdomen is soft. Tenderness: There is no abdominal tenderness. Musculoskeletal:         General: Normal range of motion. Cervical back: Normal range of motion and neck supple. Right lower leg: Edema present. Left lower leg: Edema present. Skin:     General: Skin is warm and dry. Neurological:      General: No focal deficit present. Mental Status: He is alert and oriented to person, place, and time. Mental status is at baseline.    Psychiatric:         Mood and Affect: Mood normal.         Behavior: Behavior normal.           Medications:  Scheduled Medications:    aspirin  81 mg Oral Daily    atorvastatin  20 mg Oral Nightly    furosemide  40 mg Intravenous BID    levothyroxine  75 mcg Oral Daily    sodium chloride flush  5-40 mL Intravenous 2 times per day    enoxaparin  40 mg Subcutaneous Daily    thiamine mononitrate  100 mg Oral Daily    folic acid  1 mg Oral Daily     Continuous Infusions:    dextrose      sodium chloride       PRN Medicationsglucose, 15 g, PRN  dextrose, 12.5 g, PRN  glucagon (rDNA), 1 mg, PRN  dextrose, 100 mL/hr, PRN  sodium chloride flush, 5-40 mL, PRN  sodium chloride, 25 mL, PRN  ondansetron, 4 mg, Q8H PRN   Or  ondansetron,

## 2021-07-14 NOTE — PLAN OF CARE
Problem: Falls - Risk of:  Goal: Will remain free from falls  Description: Will remain free from falls  7/14/2021 0252 by Ravin Rico RN  Outcome: Ongoing  7/13/2021 1727 by Mireya Ventura RN  Outcome: Ongoing  7/13/2021 1726 by Mireya Ventura RN  Outcome: Ongoing  Goal: Absence of physical injury  Description: Absence of physical injury  7/14/2021 0252 by Ravin Rico RN  Outcome: Ongoing  7/13/2021 1727 by Mireya Ventura RN  Outcome: Ongoing  7/13/2021 1726 by Mireya Ventura RN  Outcome: Ongoing     Problem: OXYGENATION/RESPIRATORY FUNCTION  Goal: Patient will maintain patent airway  Outcome: Ongoing     Problem: HEMODYNAMIC STATUS  Goal: Patient has stable vital signs and fluid balance  Outcome: Ongoing

## 2021-07-15 ENCOUNTER — APPOINTMENT (OUTPATIENT)
Dept: CARDIAC CATH/INVASIVE PROCEDURES | Age: 61
DRG: 192 | End: 2021-07-15
Payer: MEDICAID

## 2021-07-15 PROBLEM — D75.1 POLYCYTHEMIA: Status: ACTIVE | Noted: 2021-07-15

## 2021-07-15 LAB
-: ABNORMAL
ABSOLUTE EOS #: 0.1 K/UL (ref 0–0.44)
ABSOLUTE EOS #: 0.1 K/UL (ref 0–0.44)
ABSOLUTE IMMATURE GRANULOCYTE: <0.03 K/UL (ref 0–0.3)
ABSOLUTE IMMATURE GRANULOCYTE: <0.03 K/UL (ref 0–0.3)
ABSOLUTE LYMPH #: 1.15 K/UL (ref 1.1–3.7)
ABSOLUTE LYMPH #: 1.24 K/UL (ref 1.1–3.7)
ABSOLUTE MONO #: 0.47 K/UL (ref 0.1–1.2)
ABSOLUTE MONO #: 0.59 K/UL (ref 0.1–1.2)
ABSOLUTE RETIC #: 0.15 M/UL (ref 0.03–0.08)
AMORPHOUS: ABNORMAL
ANION GAP SERPL CALCULATED.3IONS-SCNC: 15 MMOL/L (ref 9–17)
BACTERIA: ABNORMAL
BASOPHILS # BLD: 1 % (ref 0–2)
BASOPHILS # BLD: 1 % (ref 0–2)
BASOPHILS ABSOLUTE: 0.06 K/UL (ref 0–0.2)
BASOPHILS ABSOLUTE: 0.07 K/UL (ref 0–0.2)
BILIRUBIN URINE: NEGATIVE
BUN BLDV-MCNC: 23 MG/DL (ref 8–23)
BUN/CREAT BLD: ABNORMAL (ref 9–20)
CALCIUM SERPL-MCNC: 10.2 MG/DL (ref 8.6–10.4)
CASTS UA: ABNORMAL /LPF (ref 0–8)
CHLORIDE BLD-SCNC: 95 MMOL/L (ref 98–107)
CO2: 28 MMOL/L (ref 20–31)
COLOR: ABNORMAL
CREAT SERPL-MCNC: 0.9 MG/DL (ref 0.7–1.2)
CRYSTALS, UA: ABNORMAL /HPF
DIFFERENTIAL TYPE: ABNORMAL
DIFFERENTIAL TYPE: ABNORMAL
EOSINOPHILS RELATIVE PERCENT: 1 % (ref 1–4)
EOSINOPHILS RELATIVE PERCENT: 2 % (ref 1–4)
EPITHELIAL CELLS UA: ABNORMAL /HPF (ref 0–5)
GFR AFRICAN AMERICAN: >60 ML/MIN
GFR NON-AFRICAN AMERICAN: >60 ML/MIN
GFR SERPL CREATININE-BSD FRML MDRD: ABNORMAL ML/MIN/{1.73_M2}
GFR SERPL CREATININE-BSD FRML MDRD: ABNORMAL ML/MIN/{1.73_M2}
GLUCOSE BLD-MCNC: 104 MG/DL (ref 70–99)
GLUCOSE BLD-MCNC: 132 MG/DL (ref 75–110)
GLUCOSE URINE: NEGATIVE
HCT VFR BLD CALC: 61.1 % (ref 40.7–50.3)
HCT VFR BLD CALC: 61.3 % (ref 40.7–50.3)
HEMOGLOBIN: 19.9 G/DL (ref 13–17)
HEMOGLOBIN: 20 G/DL (ref 13–17)
IMMATURE GRANULOCYTES: 0 %
IMMATURE GRANULOCYTES: 0 %
IMMATURE RETIC FRACT: 14.2 % (ref 2.7–18.3)
KETONES, URINE: ABNORMAL
LEUKOCYTE ESTERASE, URINE: NEGATIVE
LYMPHOCYTES # BLD: 17 % (ref 24–43)
LYMPHOCYTES # BLD: 18 % (ref 24–43)
MAGNESIUM: 2.3 MG/DL (ref 1.6–2.6)
MCH RBC QN AUTO: 31.9 PG (ref 25.2–33.5)
MCH RBC QN AUTO: 32.3 PG (ref 25.2–33.5)
MCHC RBC AUTO-ENTMCNC: 32.6 G/DL (ref 28.4–34.8)
MCHC RBC AUTO-ENTMCNC: 32.6 G/DL (ref 28.4–34.8)
MCV RBC AUTO: 98.1 FL (ref 82.6–102.9)
MCV RBC AUTO: 99 FL (ref 82.6–102.9)
MONOCYTES # BLD: 7 % (ref 3–12)
MONOCYTES # BLD: 9 % (ref 3–12)
MUCUS: ABNORMAL
NITRITE, URINE: NEGATIVE
NRBC AUTOMATED: 0 PER 100 WBC
NRBC AUTOMATED: 0.4 PER 100 WBC
OTHER OBSERVATIONS UA: ABNORMAL
PDW BLD-RTO: 16.1 % (ref 11.8–14.4)
PDW BLD-RTO: 16.7 % (ref 11.8–14.4)
PH UA: 6.5 (ref 5–8)
PLATELET # BLD: 209 K/UL (ref 138–453)
PLATELET # BLD: 232 K/UL (ref 138–453)
PLATELET ESTIMATE: ABNORMAL
PLATELET ESTIMATE: ABNORMAL
PMV BLD AUTO: 10.9 FL (ref 8.1–13.5)
PMV BLD AUTO: 11.1 FL (ref 8.1–13.5)
POTASSIUM SERPL-SCNC: 4.3 MMOL/L (ref 3.7–5.3)
PROTEIN UA: ABNORMAL
RBC # BLD: 6.19 M/UL (ref 4.21–5.77)
RBC # BLD: 6.23 M/UL (ref 4.21–5.77)
RBC # BLD: ABNORMAL 10*6/UL
RBC # BLD: ABNORMAL 10*6/UL
RBC UA: ABNORMAL /HPF (ref 0–4)
RENAL EPITHELIAL, UA: ABNORMAL /HPF
RETIC %: 2.4 % (ref 0.5–1.9)
RETIC HEMOGLOBIN: 37.3 PG (ref 28.2–35.7)
SEG NEUTROPHILS: 71 % (ref 36–65)
SEG NEUTROPHILS: 73 % (ref 36–65)
SEGMENTED NEUTROPHILS ABSOLUTE COUNT: 4.72 K/UL (ref 1.5–8.1)
SEGMENTED NEUTROPHILS ABSOLUTE COUNT: 5.14 K/UL (ref 1.5–8.1)
SODIUM BLD-SCNC: 138 MMOL/L (ref 135–144)
SPECIFIC GRAVITY UA: 1.02 (ref 1–1.03)
TRICHOMONAS: ABNORMAL
TURBIDITY: CLEAR
URINE HGB: NEGATIVE
UROBILINOGEN, URINE: NORMAL
WBC # BLD: 6.7 K/UL (ref 3.5–11.3)
WBC # BLD: 7 K/UL (ref 3.5–11.3)
WBC # BLD: ABNORMAL 10*3/UL
WBC # BLD: ABNORMAL 10*3/UL
WBC UA: ABNORMAL /HPF (ref 0–5)
YEAST: ABNORMAL

## 2021-07-15 PROCEDURE — 6360000004 HC RX CONTRAST MEDICATION

## 2021-07-15 PROCEDURE — 94761 N-INVAS EAR/PLS OXIMETRY MLT: CPT

## 2021-07-15 PROCEDURE — 6370000000 HC RX 637 (ALT 250 FOR IP): Performed by: STUDENT IN AN ORGANIZED HEALTH CARE EDUCATION/TRAINING PROGRAM

## 2021-07-15 PROCEDURE — 7100000011 HC PHASE II RECOVERY - ADDTL 15 MIN

## 2021-07-15 PROCEDURE — 85025 COMPLETE CBC W/AUTO DIFF WBC: CPT

## 2021-07-15 PROCEDURE — 2709999900 HC NON-CHARGEABLE SUPPLY

## 2021-07-15 PROCEDURE — 81270 JAK2 GENE: CPT

## 2021-07-15 PROCEDURE — 83735 ASSAY OF MAGNESIUM: CPT

## 2021-07-15 PROCEDURE — 2500000003 HC RX 250 WO HCPCS

## 2021-07-15 PROCEDURE — 6370000000 HC RX 637 (ALT 250 FOR IP): Performed by: INTERNAL MEDICINE

## 2021-07-15 PROCEDURE — 2580000003 HC RX 258: Performed by: STUDENT IN AN ORGANIZED HEALTH CARE EDUCATION/TRAINING PROGRAM

## 2021-07-15 PROCEDURE — 82947 ASSAY GLUCOSE BLOOD QUANT: CPT

## 2021-07-15 PROCEDURE — 80048 BASIC METABOLIC PNL TOTAL CA: CPT

## 2021-07-15 PROCEDURE — 6360000002 HC RX W HCPCS: Performed by: STUDENT IN AN ORGANIZED HEALTH CARE EDUCATION/TRAINING PROGRAM

## 2021-07-15 PROCEDURE — C1894 INTRO/SHEATH, NON-LASER: HCPCS

## 2021-07-15 PROCEDURE — 85045 AUTOMATED RETICULOCYTE COUNT: CPT

## 2021-07-15 PROCEDURE — C1769 GUIDE WIRE: HCPCS

## 2021-07-15 PROCEDURE — 99232 SBSQ HOSP IP/OBS MODERATE 35: CPT | Performed by: INTERNAL MEDICINE

## 2021-07-15 PROCEDURE — 81001 URINALYSIS AUTO W/SCOPE: CPT

## 2021-07-15 PROCEDURE — 82668 ASSAY OF ERYTHROPOIETIN: CPT

## 2021-07-15 PROCEDURE — 99255 IP/OBS CONSLTJ NEW/EST HI 80: CPT | Performed by: INTERNAL MEDICINE

## 2021-07-15 PROCEDURE — C1751 CATH, INF, PER/CENT/MIDLINE: HCPCS

## 2021-07-15 PROCEDURE — 7100000010 HC PHASE II RECOVERY - FIRST 15 MIN

## 2021-07-15 PROCEDURE — 36415 COLL VENOUS BLD VENIPUNCTURE: CPT

## 2021-07-15 PROCEDURE — 2060000000 HC ICU INTERMEDIATE R&B

## 2021-07-15 PROCEDURE — 6360000002 HC RX W HCPCS

## 2021-07-15 RX ADMIN — LEVOTHYROXINE SODIUM 75 MCG: 75 TABLET ORAL at 05:26

## 2021-07-15 RX ADMIN — HYDROCODONE BITARTRATE AND ACETAMINOPHEN 1 TABLET: 5; 325 TABLET ORAL at 12:24

## 2021-07-15 RX ADMIN — SODIUM CHLORIDE, PRESERVATIVE FREE 10 ML: 5 INJECTION INTRAVENOUS at 09:02

## 2021-07-15 RX ADMIN — FUROSEMIDE 40 MG: 10 INJECTION, SOLUTION INTRAMUSCULAR; INTRAVENOUS at 21:10

## 2021-07-15 RX ADMIN — HYDROCODONE BITARTRATE AND ACETAMINOPHEN 1 TABLET: 5; 325 TABLET ORAL at 05:26

## 2021-07-15 RX ADMIN — Medication 100 MG: at 08:58

## 2021-07-15 RX ADMIN — ASPIRIN 81 MG: 81 TABLET, CHEWABLE ORAL at 08:58

## 2021-07-15 RX ADMIN — FOLIC ACID 1 MG: 1 TABLET ORAL at 08:58

## 2021-07-15 RX ADMIN — SODIUM CHLORIDE, PRESERVATIVE FREE 10 ML: 5 INJECTION INTRAVENOUS at 21:00

## 2021-07-15 RX ADMIN — HYDROCODONE BITARTRATE AND ACETAMINOPHEN 1 TABLET: 5; 325 TABLET ORAL at 18:29

## 2021-07-15 ASSESSMENT — PAIN DESCRIPTION - PAIN TYPE
TYPE: CHRONIC PAIN
TYPE: CHRONIC PAIN

## 2021-07-15 ASSESSMENT — PAIN SCALES - GENERAL
PAINLEVEL_OUTOF10: 7
PAINLEVEL_OUTOF10: 7
PAINLEVEL_OUTOF10: 6
PAINLEVEL_OUTOF10: 6

## 2021-07-15 ASSESSMENT — PAIN DESCRIPTION - ORIENTATION
ORIENTATION: LEFT
ORIENTATION: LEFT

## 2021-07-15 ASSESSMENT — PAIN DESCRIPTION - LOCATION
LOCATION: HIP
LOCATION: HIP

## 2021-07-15 ASSESSMENT — PAIN DESCRIPTION - DESCRIPTORS: DESCRIPTORS: ACHING

## 2021-07-15 NOTE — PROGRESS NOTES
Patient admitted, consent signed and questions answered. Patient ready for procedure. Call light to reach with side rails up 2 of 2. History and physical in EPIC

## 2021-07-15 NOTE — PROGRESS NOTES
[facility]  Internal Medicine Teaching Residency Program  Inpatient Daily Progress Note  ______________________________________________________________________________    Patient: Ry Martins  YOB: 1960   [id number]    Acct: [de-identified]     Room: [address] [place] [address]-  Admit date: 7/12/2021  Today's date: 07/15/21  Number of days in the hospital: 3    SUBJECTIVE   Admitting Diagnosis: Acute combined systolic and diastolic congestive heart failure (HCC)  CC: SOB  Pt examined at bedside. Chart & results reviewed. Patient is eating ok, sleeping ok, normal bowel/ bladder movements. No fevers, chills, rigors, chest pain,shortness of breath, cough, abdominal pain, nausea, vomiting, diarrhea, dysuria or increased frequency of micturation. ROS:  Constitutional:  negative for chills, fevers, sweats  Respiratory:  SOB  Cardiovascular:  CP on exertion  Gastrointestinal:  negative for abdominal pain, constipation, diarrhea, nausea, vomiting  Neurological:  negative for dizziness, headache    BRIEF HISTORY     The patient is a pleasant 63 y.o. male past medical history of recent hip injury, osteoarthritis, hypothyroidism presents with a chief complaint of  shortness of breath and lower leg swelling for the past 10 days. Patient first noticed during his physical therapy sessions while his therapist noticed swelling. Noticed that his legs have looked increasingly more swollen and regular. Reports having chest pain whenever he exerts himself shortness of breath is noted whenever he lays down. echo performed showed 20% EF with aortic stenosis. Catheterization was attempted yesterday but patient was unable to tolerate being laid down flat. Patient was seen at bedside today. Lower extremity edema has improved greatly. Patient is able to tolerate laying down flat. No chest pain dyspnea nausea vomiting.     OBJECTIVE     Vital Signs:  /72   Pulse 74   Temp 97.8 °F (36.6 °C) (Oral)   Resp 12   Ht 5' 10\" (1.778 m)   Wt 167 lb 14.4 oz (76.2 kg)   SpO2 93%   BMI 24.09 kg/m²     Temp (24hrs), Av.5 °F (36.4 °C), Min:97.4 °F (36.3 °C), Max:97.8 °F (36.6 °C)    In: 120   Out: 3825 [Urine:3825]    Physical Exam:  Physical Exam  Constitutional:       Appearance: Normal appearance. HENT:      Head: Normocephalic and atraumatic. Nose: Nose normal.      Mouth/Throat:      Mouth: Mucous membranes are moist.   Eyes:      Extraocular Movements: Extraocular movements intact. Pupils: Pupils are equal, round, and reactive to light. Cardiovascular:      Rate and Rhythm: Normal rate and regular rhythm. Pulses: Normal pulses. Heart sounds: Murmur (holosystolic radiating to carotids b/l) heard. Pulmonary:      Effort: Pulmonary effort is normal.      Breath sounds: Normal breath sounds. No rales. Abdominal:      General: Bowel sounds are normal. There is no distension. Palpations: Abdomen is soft. Tenderness: There is no abdominal tenderness. Musculoskeletal:         General: Normal range of motion. Cervical back: Normal range of motion and neck supple. Right lower leg: Edema present. Left lower leg: Edema present. Comments: Improving LLE edema     Skin:     General: Skin is warm and dry. Neurological:      General: No focal deficit present. Mental Status: He is alert and oriented to person, place, and time. Mental status is at baseline.    Psychiatric:         Mood and Affect: Mood normal.         Behavior: Behavior normal.           Medications:  Scheduled Medications:    aspirin  81 mg Oral Daily    atorvastatin  20 mg Oral Nightly    furosemide  40 mg Intravenous BID    levothyroxine  75 mcg Oral Daily    sodium chloride flush  5-40 mL Intravenous 2 times per day    enoxaparin  40 mg Subcutaneous Daily    thiamine mononitrate  100 mg Oral Daily    folic acid  1 mg Oral Daily     Continuous Infusions:  dextrose      sodium chloride       PRN Medicationsglucose, 15 g, PRN  dextrose, 12.5 g, PRN  glucagon (rDNA), 1 mg, PRN  dextrose, 100 mL/hr, PRN  sodium chloride flush, 5-40 mL, PRN  sodium chloride, 25 mL, PRN  ondansetron, 4 mg, Q8H PRN   Or  ondansetron, 4 mg, Q6H PRN  polyethylene glycol, 17 g, Daily PRN  acetaminophen, 650 mg, Q6H PRN   Or  acetaminophen, 650 mg, Q6H PRN  HYDROcodone-acetaminophen, 1 tablet, Q6H PRN        Diagnostic Labs:  CBC:   Recent Labs     07/13/21  0533 07/14/21  0501 07/15/21  0541   WBC 5.7 6.4 6.7   RBC 4.90 5.67 6.23*   HGB 15.5 18.2* 19.9*   HCT 48.6 56.8* 61.1*   MCV 99.2 100.2 98.1   RDW 15.8* 15.6* 16.1*    199 209     BMP:   Recent Labs     07/13/21  0533 07/14/21  0501 07/15/21  0541    134* 138   K 4.3 4.7 4.3    98 95*   CO2 24 25 28   BUN 21 20 23   CREATININE 0.89 1.02 0.90     FASTING LIPID PANEL:  Lab Results   Component Value Date    CHOL 131 07/13/2021    HDL 36 (L) 07/13/2021    TRIG 74 07/13/2021     LIVER PROFILE:   Recent Labs     07/12/21  1002   AST 34   ALT 26   BILIDIR 0.33*   BILITOT 0.91   ALKPHOS 135*      MICROBIOLOGY: No results found for: CULTURE    Imaging:    XR CHEST (2 VW)    Result Date: 7/12/2021  Prominent cardiomegaly without evidence of CHF or pulmonary edema. Anterior wedged lower thoracic vertebra, chronicity unknown. ASSESSMENT & PLAN     Assessment and Plan:    Principal Problem:    Acute combined systolic and diastolic congestive heart failure (HCC)  Active Problems:    Low back pain    Hypothyroid    Congestive heart failure of unknown etiology ([facility]    Acute left-sided CHF (congestive heart failure) (HCC)    Smoking    Aortic stenosis    Alcohol abuse    Polycythemia  Resolved Problems:    * No resolved hospital problems. *    CHF exacerbation  Aortic stenosis  -Echo showing EF of 20% with significant aortic valve calcification  -Was unable to tolerate cath yesterday.   Will reattempt today pending heme onc clearance  - cardio on board  -Keep n.p.o. until after procedure  - lasix 40 iv bid  - avoid calcium channel blockers    Polycythemia  - hgb elevated 20  - suspect due to smoking hx and hypoxia  - will order erythropoietin, Jose A 2 gene mutation, blood smear and U/A with microscopic eval.     LE swelling due to CHF exacerbation  - continue lasix  -Lower extremity duplex negative for DVTs. Lower back pain  - CT showed possible compression fx  - MRI ruled out any fracture. Disc buldge in t6 t7 area    Hypothyroidism  -Continue levothyroxine     Osteoarthritis  -Continue Norco per home dose     Smoking hx  - intermittently smokes 1-2 ppd  - recommend cessation     Alcohol use hx- no concerns for withdrawal  - continue thiamine folic acid and MVT.       Michelle Fonseca  Internal Medicine Resident  [facility]  7/15/2021 8:27 AM

## 2021-07-15 NOTE — CARE COORDINATION
Transitional planning:  Received report in nursing rounds, pt having cardiac cath today, possible stent.

## 2021-07-15 NOTE — CONSULTS
Today's Date: 7/15/2021  Patient Name: Lalita Tadeo  Date of admission: 7/12/2021  9:19 AM  Patient's age: 63 y.o., 1960  Admission Dx: Congestive heart failure of unknown etiology ([facility] [address] [I50.9]  Acute left-sided CHF (congestive heart failure) ([facility] [address] [I50.1]    Reason for Consult: management recommendations  Requesting Physician: Kenneth Nolasco MD    CHIEF COMPLAINT: Shortness of breath. Leg swelling. History Obtained From:  patient, electronic medical record    HISTORY OF PRESENT ILLNESS:      The patient is a 63 y.o.  male who is admitted to the hospital for  chief complaint of lower extremity swelling and shortness of breath. Denies any chest pain. Denies any vision changes headaches or strokelike symptoms. Echocardiogram done shows aortic stenosis with low ejection fraction. Patient has not had cardiac work-up done before. Patient started on diuretics and cardiac cath being planned. Hematology oncology team consulted due to rising hemoglobin and hematocrit. Review of record reveals patient hemoglobin was 14.9 back in 2015. Repeat hemoglobin from 2016 was 15.9 more recently after admission patient hemoglobin was 16 with hematocrit of 49. During hospitalization patient hemoglobin has been gradually rising and was reported to be 20 on 7/15/2021. Reticulocyte count is 2.4. Patient's chest x-ray showed cardiomegaly with evidence of CHF and pulmonary edema. Patient is currently on aspirin Lovenox. Patient also receiving Lasix 40 mg IV twice daily. Per record there is also concern regarding poor alcohol abuse. Patient denies being a heavy smoker. Echocardiogram showed ejection fraction 20% and significant aortic valve calcification. Patient TSH was within range at 4. Past Medical History:   has a past medical history of Osteoarthritis. Past Surgical History: Denies any previous history of hypertension diabetes.   Positive history of hypothyroidism    Medications:    Prior to Admission medications    Medication Sig Start Date End Date Taking? Authorizing Provider   HYDROcodone-acetaminophen (NORCO) 7.5-325 MG per tablet Take 1 tablet by mouth every 6 hours as needed for Pain.    Yes Historical Provider, MD   meloxicam (MOBIC) 7.5 MG tablet TAKE 1 TAB BY MOUTH ONCE A DAY 4/13/21  Yes Vijay Martins MD   levothyroxine (SYNTHROID) 75 MCG tablet TAKE 1 TAB BY MOUTH ONCE A DAY 4/13/21  Yes Vijay Martins MD     Current Facility-Administered Medications   Medication Dose Route Frequency Provider Last Rate Last Admin    glucose (GLUTOSE) 40 % oral gel 15 g  15 g Oral PRN Tye Freedman MD        dextrose 50 % IV solution  12.5 g Intravenous PRN Tye Freedman MD        glucagon (rDNA) injection 1 mg  1 mg Intramuscular PRN Tye Freedman MD        dextrose 5 % solution  100 mL/hr Intravenous PRN Tye Freedman MD        aspirin chewable tablet 81 mg  81 mg Oral Daily Tye Freedman MD   81 mg at 07/15/21 0858    atorvastatin (LIPITOR) tablet 20 mg  20 mg Oral Nightly Tye Freedman MD   20 mg at 07/14/21 2040    furosemide (LASIX) injection 40 mg  40 mg Intravenous BID Robert Orellana MD   40 mg at 07/14/21 1713    levothyroxine (SYNTHROID) tablet 75 mcg  75 mcg Oral Daily Bandar Chang MD   75 mcg at 07/15/21 0526    sodium chloride flush 0.9 % injection 5-40 mL  5-40 mL Intravenous 2 times per day Bandar Chang MD   10 mL at 07/15/21 0902    sodium chloride flush 0.9 % injection 5-40 mL  5-40 mL Intravenous PRN Bandar Chang MD        0.9 % sodium chloride infusion  25 mL Intravenous PRN Bandar Chang MD        enoxaparin (LOVENOX) injection 40 mg  40 mg Subcutaneous Daily Bandar Chang MD   40 mg at 07/13/21 0834    ondansetron (ZOFRAN-ODT) disintegrating tablet 4 mg  4 mg Oral Q8H PRN Bandar Chang MD        Or    ondansetron [facility]) injection 4 mg  4 mg Intravenous Q6H PRN Bandar Chang MD        polyethylene glycol (GLYCOLAX) packet 17 g  17 g Oral Daily PRN Bandar Chang MD  acetaminophen (TYLENOL) tablet 650 mg  650 mg Oral Q6H PRN Sandra Powers MD        Or    acetaminophen (TYLENOL) suppository 650 mg  650 mg Rectal Q6H PRN Sandra Powers MD        HYDROcodone-acetaminophen [facility]) 5-325 MG per tablet 1 tablet  1 tablet Oral Q6H PRN Ally Diaz MD   1 tablet at 07/15/21 0526    thiamine mononitrate tablet 100 mg  100 mg Oral Daily Anjana Gonzalez MD   100 mg at 16/25/39 7654    folic acid (FOLVITE) tablet 1 mg  1 mg Oral Daily Anjana Gonzalez MD   1 mg at 07/15/21 2930       Allergies:  Patient has no known allergies. Social History:   reports that he has been smoking. He has a 10.00 pack-year smoking history. He has never used smokeless tobacco. He reports current alcohol use. He reports that he does not use drugs. Family History: family history is not on file. REVIEW OF SYSTEMS:      Constitutional: No fever or chills. No night sweats, no weight loss   Eyes: No eye discharge, double vision, or eye pain   HEENT: negative for sore mouth, sore throat, hoarseness and voice change   Respiratory: negative for cough , sputum, dyspnea, wheezing, hemoptysis, chest pain   Cardiovascular: negative for chest pain, dyspnea, palpitations, orthopnea, PND   Gastrointestinal: negative for nausea, vomiting, diarrhea, constipation, abdominal pain, Dysphagia, hematemesis and hematochezia   Genitourinary: negative for frequency, dysuria, nocturia, urinary incontinence, and hematuria   Integument: negative for rash, skin lesions, bruises.    Hematologic/Lymphatic: negative for easy bruising, bleeding, lymphadenopathy, or petechiae   Endocrine: negative for heat or cold intolerance,weight changes, change in bowel habits and hair loss   Musculoskeletal: negative for myalgias, arthralgias, pain, joint swelling,and bone pain   Neurological: negative for headaches, dizziness, seizures, weakness, numbness    PHYSICAL EXAM:        /72   Pulse 74   Temp 97.8 °F (36.6 °C) (Oral)   Resp 12 Ht 5' 10\" (1.778 m)   Wt 167 lb 14.4 oz (76.2 kg)   SpO2 93%   BMI 24.09 kg/m²    Temp (24hrs), Av.5 °F (36.4 °C), Min:97.4 °F (36.3 °C), Max:97.8 °F (36.6 °C)      General appearance - well appearing, no in pain or distress   Mental status - alert and cooperative   Eyes - pupils equal and reactive, extraocular eye movements intact   Ears - bilateral TM's and external ear canals normal   Mouth - mucous membranes moist, pharynx normal without lesions   Neck - supple, no significant adenopathy   Lymphatics - no palpable lymphadenopathy, no hepatosplenomegaly   Chest - clear to auscultation, no wheezes, rales or rhonchi, symmetric air entry   Heart - normal rate, regular rhythm, normal S1, S2, no murmurs  Abdomen - soft, nontender, nondistended, no masses or organomegaly   Neurological - alert, oriented, normal speech, no focal findings or movement disorder noted   Musculoskeletal - no joint tenderness, deformity or swelling   Extremities - peripheral pulses normal, no pedal edema, no clubbing or cyanosis   Skin - normal coloration and turgor, no rashes, no suspicious skin lesions noted ,      DATA:      Labs:     Results for orders placed or performed during the hospital encounter of 21   CBC Auto Differential   Result Value Ref Range    WBC 6.2 3.5 - 11.3 k/uL    RBC 4.98 4.21 - 5.77 m/uL    Hemoglobin 16.0 13.0 - 17.0 g/dL    Hematocrit 49.3 40.7 - 50.3 %    MCV 99.0 82.6 - 102.9 fL    MCH 32.1 25.2 - 33.5 pg    MCHC 32.5 28.4 - 34.8 g/dL    RDW 15.8 (H) 11.8 - 14.4 %    Platelets 804 864 - 936 k/uL    MPV 10.7 8.1 - 13.5 fL    NRBC Automated 0.0 0.0 per 100 WBC    Differential Type NOT REPORTED     Seg Neutrophils 73 (H) 36 - 65 %    Lymphocytes 19 (L) 24 - 43 %    Monocytes 6 3 - 12 %    Eosinophils % 1 1 - 4 %    Basophils 1 0 - 2 %    Immature Granulocytes 0 0 %    Segs Absolute 4.57 1.50 - 8.10 k/uL    Absolute Lymph # 1.15 1.10 - 3.70 k/uL    Absolute Mono # 0.37 0.10 - 1.20 k/uL    Absolute Eos # 0. 09 0.00 - 0.44 k/uL    Basophils Absolute 0.03 0.00 - 0.20 k/uL    Absolute Immature Granulocyte <0.03 0.00 - 0.30 k/uL    WBC Morphology NOT REPORTED     RBC Morphology ANISOCYTOSIS PRESENT     Platelet Estimate NOT REPORTED    Basic Metabolic Panel w/ Reflex to MG   Result Value Ref Range    Glucose 113 (H) 70 - 99 mg/dL    BUN 23 8 - 23 mg/dL    CREATININE 0.72 0.70 - 1.20 mg/dL    Bun/Cre Ratio NOT REPORTED 9 - 20    Calcium 9.3 8.6 - 10.4 mg/dL    Sodium 139 135 - 144 mmol/L    Potassium 4.3 3.7 - 5.3 mmol/L    Chloride 107 98 - 107 mmol/L    CO2 23 20 - 31 mmol/L    Anion Gap 9 9 - 17 mmol/L    GFR Non-African American >60 >60 mL/min    GFR African American >60 >60 mL/min    GFR Comment          GFR Staging NOT REPORTED    Hepatic Function Panel   Result Value Ref Range    Albumin 3.6 3.5 - 5.2 g/dL    Alkaline Phosphatase 135 (H) 40 - 129 U/L    ALT 26 5 - 41 U/L    AST 34 <40 U/L    Total Bilirubin 0.91 0.3 - 1.2 mg/dL    Bilirubin, Direct 0.33 (H) <0.31 mg/dL    Bilirubin, Indirect 0.58 0.00 - 1.00 mg/dL    Total Protein 6.7 6.4 - 8.3 g/dL    Globulin NOT REPORTED 1.5 - 3.8 g/dL    Albumin/Globulin Ratio 1.2 1.0 - 2.5   Troponin   Result Value Ref Range    Troponin, High Sensitivity 67 (HH) 0 - 22 ng/L    Troponin T NOT REPORTED <0.03 ng/mL    Troponin Interp NOT REPORTED    Brain Natriuretic Peptide   Result Value Ref Range    Pro-BNP 6,364 (H) <300 pg/mL    BNP Interpretation Pro-BNP Reference Range:    Troponin   Result Value Ref Range    Troponin, High Sensitivity 72 (HH) 0 - 22 ng/L    Troponin T NOT REPORTED <0.03 ng/mL    Troponin Interp NOT REPORTED    TSH with Reflex   Result Value Ref Range    TSH 4.00 0.30 - 5.00 mIU/L   Basic Metabolic Panel w/ Reflex to MG   Result Value Ref Range    Glucose 97 70 - 99 mg/dL    BUN 21 8 - 23 mg/dL    CREATININE 0.89 0.70 - 1.20 mg/dL    Bun/Cre Ratio NOT REPORTED 9 - 20    Calcium 8.7 8.6 - 10.4 mg/dL    Sodium 137 135 - 144 mmol/L    Potassium 4.3 3.7 - 5.3 mmol/L    Chloride 105 98 - 107 mmol/L    CO2 24 20 - 31 mmol/L    Anion Gap 8 (L) 9 - 17 mmol/L    GFR Non-African American >60 >60 mL/min    GFR African American >60 >60 mL/min    GFR Comment          GFR Staging NOT REPORTED    CBC auto differential   Result Value Ref Range    WBC 5.7 3.5 - 11.3 k/uL    RBC 4.90 4.21 - 5.77 m/uL    Hemoglobin 15.5 13.0 - 17.0 g/dL    Hematocrit 48.6 40.7 - 50.3 %    MCV 99.2 82.6 - 102.9 fL    MCH 31.6 25.2 - 33.5 pg    MCHC 31.9 28.4 - 34.8 g/dL    RDW 15.8 (H) 11.8 - 14.4 %    Platelets 980 258 - 452 k/uL    MPV 11.0 8.1 - 13.5 fL    NRBC Automated 0.0 0.0 per 100 WBC    Differential Type NOT REPORTED     Seg Neutrophils 68 (H) 36 - 65 %    Lymphocytes 23 (L) 24 - 43 %    Monocytes 6 3 - 12 %    Eosinophils % 2 1 - 4 %    Basophils 1 0 - 2 %    Immature Granulocytes 0 0 %    Segs Absolute 3.90 1.50 - 8.10 k/uL    Absolute Lymph # 1.32 1.10 - 3.70 k/uL    Absolute Mono # 0.32 0.10 - 1.20 k/uL    Absolute Eos # 0.11 0.00 - 0.44 k/uL    Basophils Absolute 0.03 0.00 - 0.20 k/uL    Absolute Immature Granulocyte <0.03 0.00 - 0.30 k/uL    WBC Morphology NOT REPORTED     RBC Morphology ANISOCYTOSIS PRESENT     Platelet Estimate NOT REPORTED    HEMOGLOBIN A1C   Result Value Ref Range    Hemoglobin A1C 6.0 4.0 - 6.0 %    Estimated Avg Glucose 126 mg/dL   LIPID PANEL   Result Value Ref Range    Cholesterol 131 <200 mg/dL    HDL 36 (L) >40 mg/dL    LDL Cholesterol 80 0 - 130 mg/dL    Chol/HDL Ratio 3.6 <5    Triglycerides 74 <150 mg/dL    VLDL NOT REPORTED 1 - 30 mg/dL   Basic Metabolic Panel w/ Reflex to MG   Result Value Ref Range    Glucose 88 70 - 99 mg/dL    BUN 20 8 - 23 mg/dL    CREATININE 1.02 0.70 - 1.20 mg/dL    Bun/Cre Ratio NOT REPORTED 9 - 20    Calcium 10.0 8.6 - 10.4 mg/dL    Sodium 134 (L) 135 - 144 mmol/L    Potassium 4.7 3.7 - 5.3 mmol/L    Chloride 98 98 - 107 mmol/L    CO2 25 20 - 31 mmol/L    Anion Gap 11 9 - 17 mmol/L    GFR Non-African American >60 >60 mL/min    GFR  >60 >60 mL/min    GFR Comment          GFR Staging NOT REPORTED    CBC auto differential   Result Value Ref Range    WBC 6.4 3.5 - 11.3 k/uL    RBC 5.67 4.21 - 5.77 m/uL    Hemoglobin 18.2 (H) 13.0 - 17.0 g/dL    Hematocrit 56.8 (H) 40.7 - 50.3 %    .2 82.6 - 102.9 fL    MCH 32.1 25.2 - 33.5 pg    MCHC 32.0 28.4 - 34.8 g/dL    RDW 15.6 (H) 11.8 - 14.4 %    Platelets 179 696 - 332 k/uL    MPV 11.0 8.1 - 13.5 fL    NRBC Automated 0.0 0.0 per 100 WBC    Differential Type NOT REPORTED     Seg Neutrophils 68 (H) 36 - 65 %    Lymphocytes 23 (L) 24 - 43 %    Monocytes 6 3 - 12 %    Eosinophils % 2 1 - 4 %    Basophils 1 0 - 2 %    Immature Granulocytes 0 0 %    Segs Absolute 4.30 1.50 - 8.10 k/uL    Absolute Lymph # 1.46 1.10 - 3.70 k/uL    Absolute Mono # 0.39 0.10 - 1.20 k/uL    Absolute Eos # 0.12 0.00 - 0.44 k/uL    Basophils Absolute 0.06 0.00 - 0.20 k/uL    Absolute Immature Granulocyte <0.03 0.00 - 0.30 k/uL    WBC Morphology NOT REPORTED     RBC Morphology ANISOCYTOSIS PRESENT     Platelet Estimate NOT REPORTED    Basic Metabolic Panel w/ Reflex to MG   Result Value Ref Range    Glucose 104 (H) 70 - 99 mg/dL    BUN 23 8 - 23 mg/dL    CREATININE 0.90 0.70 - 1.20 mg/dL    Bun/Cre Ratio NOT REPORTED 9 - 20    Calcium 10.2 8.6 - 10.4 mg/dL    Sodium 138 135 - 144 mmol/L    Potassium 4.3 3.7 - 5.3 mmol/L    Chloride 95 (L) 98 - 107 mmol/L    CO2 28 20 - 31 mmol/L    Anion Gap 15 9 - 17 mmol/L    GFR Non-African American >60 >60 mL/min    GFR African American >60 >60 mL/min    GFR Comment          GFR Staging NOT REPORTED    CBC auto differential   Result Value Ref Range    WBC 6.7 3.5 - 11.3 k/uL    RBC 6.23 (H) 4.21 - 5.77 m/uL    Hemoglobin 19.9 (H) 13.0 - 17.0 g/dL    Hematocrit 61.1 (H) 40.7 - 50.3 %    MCV 98.1 82.6 - 102.9 fL    MCH 31.9 25.2 - 33.5 pg    MCHC 32.6 28.4 - 34.8 g/dL    RDW 16.1 (H) 11.8 - 14.4 %    Platelets 371 648 - 173 k/uL    MPV 11.1 8.1 - 13.5 fL    NRBC Automated 0.0 0.0 per 100 WBC    Differential Type NOT REPORTED     Seg Neutrophils 71 (H) 36 - 65 %    Lymphocytes 17 (L) 24 - 43 %    Monocytes 9 3 - 12 %    Eosinophils % 2 1 - 4 %    Basophils 1 0 - 2 %    Immature Granulocytes 0 0 %    Segs Absolute 4.72 1.50 - 8.10 k/uL    Absolute Lymph # 1.15 1.10 - 3.70 k/uL    Absolute Mono # 0.59 0.10 - 1.20 k/uL    Absolute Eos # 0.10 0.00 - 0.44 k/uL    Basophils Absolute 0.07 0.00 - 0.20 k/uL    Absolute Immature Granulocyte <0.03 0.00 - 0.30 k/uL    WBC Morphology NOT REPORTED     RBC Morphology ANISOCYTOSIS PRESENT     Platelet Estimate NOT REPORTED    Reticulocytes   Result Value Ref Range    Retic % 2.4 (H) 0.5 - 1.9 %    Absolute Retic # 0.150 (H) 0.030 - 0.080 M/uL    Immature Retic Fract 14.200 2.7 - 18.3 %    Retic Hemoglobin 37.3 (H) 28.2 - 35.7 pg   CBC Auto Differential   Result Value Ref Range    WBC 7.0 3.5 - 11.3 k/uL    RBC 6.19 (H) 4.21 - 5.77 m/uL    Hemoglobin 20.0 (H) 13.0 - 17.0 g/dL    Hematocrit 61.3 (H) 40.7 - 50.3 %    MCV 99.0 82.6 - 102.9 fL    MCH 32.3 25.2 - 33.5 pg    MCHC 32.6 28.4 - 34.8 g/dL    RDW 16.7 (H) 11.8 - 14.4 %    Platelets 460 630 - 521 k/uL    MPV 10.9 8.1 - 13.5 fL    NRBC Automated 0.4 (H) 0.0 per 100 WBC    Differential Type NOT REPORTED     WBC Morphology NOT REPORTED     RBC Morphology ANISOCYTOSIS PRESENT     Platelet Estimate NOT REPORTED     Seg Neutrophils 73 (H) 36 - 65 %    Lymphocytes 18 (L) 24 - 43 %    Monocytes 7 3 - 12 %    Eosinophils % 1 1 - 4 %    Basophils 1 0 - 2 %    Immature Granulocytes 0 0 %    Segs Absolute 5.14 1.50 - 8.10 k/uL    Absolute Lymph # 1.24 1.10 - 3.70 k/uL    Absolute Mono # 0.47 0.10 - 1.20 k/uL    Absolute Eos # 0.10 0.00 - 0.44 k/uL    Basophils Absolute 0.06 0.00 - 0.20 k/uL    Absolute Immature Granulocyte <0.03 0.00 - 0.30 k/uL   Magnesium   Result Value Ref Range    Magnesium 2.3 1.6 - 2.6 mg/dL   POC Glucose Fingerstick   Result Value Ref Range    POC Glucose 85 75 - 110 mg/dL   POC Glucose is severely reduced. Estimated ejection fraction is 20 % . Calculated EF via 3D Heart Model is 22 %. Moderate left ventricular hypertrophy. Severe global hypokinesis. Grade III (severe) left ventricular diastolic dysfunction. Left atrium is severely dilated. Right atrial dilatation. The aortic valve is severely calcified with restricted mobility. The aortic valve appears possibly bicuspid but difficult to determine. Severe (possibly critical) aortic stenosis is noted. Velocities may be under estimated due to reduced systolic function. Mean gradient of 43 mmHg is noted. Dimensionless index 0.08, CHERYL 0.33 cm2. Mild aortic insufficiency. Thickened mitral valve leaflets. Mild mitral regurgitation. Mild tricuspid regurgitation. Estimated right ventricular systolic pressure is 36 mmHg. Trivial pulmonic insufficiency. Signature ----------------------------------------------------------------------------  Electronically signed by Shanell Buckley(Sonographer) on 07/12/2021  05:27 PM ---------------------------------------------------------------------------- ----------------------------------------------------------------------------  Electronically signed by Parish Kay(Interpreting physician) on 07/13/2021  12:10 PM ---------------------------------------------------------------------------- FINDINGS Left Atrium Left atrium is severely dilated. Left Ventricle Left ventricle is enlarged. Global left ventricular systolic function is severely reduced. Estimated ejection fraction is 20 % . Calculated EF via 3D Heart Model is 22 %. Moderate left ventricular hypertrophy. Severe global hypokinesis. Grade III (severe) left ventricular diastolic dysfunction. Right Atrium Right atrial dilatation. Right Ventricle Normal right ventricular size and function. Mitral Valve Thickened mitral valve leaflets. Mild mitral regurgitation. No mitral stenosis. Aortic Valve The aortic valve is severely calcified with restricted mobility.  The aortic valve appears possibly bicuspid but difficult to determine. Severe (possibly critical) aortic stenosis is noted. Velocities may be under estimated due to reduced systolic function. Mean gradient of 43 mmHg is noted. Dimensionless index 0.08, CHERYL 0.33 cm2. Mild aortic insufficiency. Tricuspid Valve Normal tricuspid valve leaflets. Mild tricuspid regurgitation. No tricuspid stenosis. Estimated right ventricular systolic pressure is 36 mmHg. Pulmonic Valve Pulmonic valve is normal in structure and function. Trivial pulmonic insufficiency. No evidence of pulmonic stenosis. Pericardial Effusion No pericardial effusion. Miscellaneous Normal aortic root dimension. The ascending aorta is normal in size. E/E' average = 16.3. IVC Increased diameter and impaired or no inspiratory variation indicating elevated RA filling pressure (i.e. CVP) .  M-mode / 2D Measurements & Calculations:   LVIDd:6.3 cm(3.7 - 5.6 cm)        Diastolic YSGCAY:878 ml  EITBQ:0.4 cm(2.2 - 4.0 cm)        Systolic QEFIOJ:865 ml  JZCX:6.6 cm(0.6 - 1.1 cm)         Aortic Root:3.2 cm(2.0 - 3.7 cm)  LVPWd:1.5 cm(0.6 - 1.1 cm)        LA Dimension: 6 cm(1.9 - 4.0 cm)  Fractional Shortenin.7 %      LA volume/Index: 125 ml /60m^2  Calculated LVEF (%): 21.82 %      LVOT:2.4 cm                                    RVDd:3.9 cm   Mitral:                                 Aortic   Valve Area (P1/2-Time): 3.55 cm^2       Peak Velocity: 2.07 m/s  Peak E-Wave: 0.78 m/s                   Mean Velocity: 1.40 m/s  Peak A-Wave: 0.20 m/s                   Peak Gradient: 17.14 mmHg  E/A Ratio: 3.98                         Mean Gradient: 33 mmHg  Peak Gradient: 2.46 mmHg  Mean Gradient: 1 mmHg  Deceleration Time: 183 msec             Area (continuity): 0.46 cm^2  P1/2t: 62 msec                          AV VTI: 98.6 cm   Area (continuity): 1.41 cm^2  Mean Velocity: 0.51 m/s   Tricuspid:                              Pulmonic:   Estimated RVSP: 36 mmHg                 Peak Velocity: 0.77 m/s  Peak TR Velocity: 2.31 m/s              Peak Gradient: 2.4 mmHg  Peak TR Gradient: 21.3444 mmHg  Estimated RA Pressure: 15 mmHg                                           Estimated PASP: 36.34 mmHg  Diastology / Tissue Doppler Septal Wall E' velocity:0.04 m/s Septal Wall E/E':17.6 Lateral Wall E' velocity:0.05 m/s Lateral Wall E/E':15    XR CHEST (2 VW)    Result Date: 7/12/2021  EXAMINATION: TWO XRAY VIEWS OF THE CHEST 7/12/2021 9:59 am COMPARISON: None. HISTORY: ORDERING SYSTEM PROVIDED HISTORY: shortness of breath TECHNOLOGIST PROVIDED HISTORY: shortness of breath FINDINGS: Frontal and lateral views of the chest are electronically marked regarding sides. Marked cardiomegaly. Pulmonary vascularity is within normal limits. No infiltrates, effusions or pneumothoraces. Mild-moderate decreased anterior vertebral body height at the thoracolumbar junction, chronicity unknown. There are some marginal osteophytes seen within the spine. Prominent cardiomegaly without evidence of CHF or pulmonary edema. Anterior wedged lower thoracic vertebra, chronicity unknown. MRI THORACIC SPINE WO CONTRAST    Result Date: 7/13/2021  EXAMINATION: MRI OF THE THORACIC SPINE WITHOUT CONTRAST  7/13/2021 1:25 pm TECHNIQUE: Multiplanar multisequence MRI of the thoracic spine was performed without the administration of intravenous contrast. COMPARISON: None. HISTORY: ORDERING SYSTEM PROVIDED HISTORY: Rule out fracture TECHNOLOGIST PROVIDED HISTORY: Rule out fracture Reason for Exam: rule out fracture FINDINGS: BONES/ALIGNMENT: Thoracic spine alignment is normal.  Thoracic vertebral bodies are normal in height. No acute thoracic spine fracture. The marrow signal pattern throughout the thoracic spine is normal. SPINAL CORD: No abnormal cord signal is seen. SOFT TISSUES: No paraspinal mass identified. DEGENERATIVE CHANGES: Right paracentral disc bulge and osteophyte at T6-T7 measures 5.5 mm.   This encroaches the right ventrolateral surface of the thoracic spinal cord without diony thoracic spinal cord compression. No significant thoracic spinal canal stenosis. No significant thoracic neural foraminal stenosis. 1. No acute thoracic spine abnormality. Specifically, no acute thoracic spine fracture. 2. Right paracentral disc bulge and osteophyte at T6-T7 encroaches on the right ventrolateral thoracic spinal cord without thoracic spinal cord compression. VL DUP LOWER EXTREMITY VENOUS BILATERAL    Result Date: 7/13/2021    [facility]  Vascular Lower Extremities DVT Study Procedure   Patient Name  [facility]    Date of Study         07/13/2021                [facility]   Date of Birth 1960  Gender                Male   Age           63 year(s)  Race                  Unknown   Room Number   0328        Height:               70 inch, 177.8 cm   Corporate ID  [id number]    Weight:               196 pounds, 88.9 kg  #   Patient Acct  [de-identified]   BSA:       2.07 m^2   BMI:         28.12 kg/m^2  #   MR #          [id number]     Sonographer           Shantel Hansen RVT, [facility]   Accession #   [id number]  Interpreting          Marisol Matute                            Physician   Referring                 Referring Physician   Rhea Treviño MD  Nurse  Practitioner  Procedure Type of Study:   Veins: Lower Extremities DVT Study, Venous Scan Lower Bilateral.  Indications for Study:Leg Swelling. Patient Status: In Patient. Conclusions   Summary   Simultaneous real time imaging utilizing B-Mode, color doppler and  spectral waveform analysis was performed on the bilateral lower  extremities for venous examination of the deep and superficial systems. Findings are:   Right:  No evidence of deep or superficial venous thrombosis. Left:  No evidence of deep or superficial venous thrombosis.    Signature   ----------------------------------------------------------------  Electronically signed by Shantel Hansen RVT, ANNETTA(Sonographer) on 07/13/2021 06:14 PM  ----------------------------------------------------------------   ----------------------------------------------------------------  Electronically signed by Cecelia Dimas(Interpreting  physician) on 07/13/2021 10:36 PM  ----------------------------------------------------------------  Findings:   Right Impression:                    Left Impression:  The common femoral, femoral,         The common femoral, femoral,  popliteal and tibial veins           popliteal and tibial veins  demonstrate normal compressibility   demonstrate normal compressibility  and augmentation. and augmentation. Normal compressibility of the great  Normal compressibility of the great  saphenous vein. saphenous vein. Normal compressibility of the small  Normal compressibility of the small  saphenous vein. saphenous vein. Risk Factors History +--------------------------+---------------------+-------------------------+ ! Diagnosis                 ! Date                 ! Comments                 ! +--------------------------+---------------------+-------------------------+ ! CHF                       ! !                         ! +--------------------------+---------------------+-------------------------+ Velocities are measured in cm/s ; Diameters are measured in cm Right Lower Extremities DVT Study Measurements Right 2D Measurements +------------------------------------+----------+---------------+----------+ ! Location                            ! Visualized! Compressibility! Thrombosis! +------------------------------------+----------+---------------+----------+ ! Common Femoral                      !Yes       ! Yes            ! None      ! +------------------------------------+----------+---------------+----------+ ! Prox Femoral                        !Yes       ! Yes            ! None      ! +------------------------------------+----------+---------------+----------+ ! Mid Femoral                         !Yes       ! Yes            ! None      ! +------------------------------------+----------+---------------+----------+ ! Dist Femoral                        !Yes       ! Yes            ! None      ! +------------------------------------+----------+---------------+----------+ ! Deep Femoral                        !Yes       ! Yes            ! None      ! +------------------------------------+----------+---------------+----------+ ! Popliteal                           !Yes       ! Yes            ! None      ! +------------------------------------+----------+---------------+----------+ ! Sapheno Femoral Junction            ! Yes       ! Yes            ! None      ! +------------------------------------+----------+---------------+----------+ ! PTV                                 ! Yes       ! Yes            ! None      ! +------------------------------------+----------+---------------+----------+ ! Peroneal                            !Yes       ! Yes            ! None      ! +------------------------------------+----------+---------------+----------+ ! Gastroc                             ! Yes       ! Yes            ! None      ! +------------------------------------+----------+---------------+----------+ ! GSV Thigh                           ! Yes       ! Yes            ! None      ! +------------------------------------+----------+---------------+----------+ ! GSV Knee                            ! Yes       ! Yes            ! None      ! +------------------------------------+----------+---------------+----------+ ! GSV Ankle                           ! Yes       ! Yes            ! None      ! +------------------------------------+----------+---------------+----------+ ! SSV                                 ! Yes       ! Yes            ! None      ! +------------------------------------+----------+---------------+----------+ Right Doppler Measurements +--------------------------+---------+------+------------------------------+ ! Location                  ! Signal   !Reflux! Reflux (msec)                 ! +--------------------------+---------+------+------------------------------+ ! Common Femoral            !Pulsatile!      !                              ! +--------------------------+---------+------+------------------------------+ ! Prox Femoral              !Pulsatile!      !                              ! +--------------------------+---------+------+------------------------------+ ! Popliteal                 !Pulsatile!      !                              ! +--------------------------+---------+------+------------------------------+ Left Lower Extremities DVT Study Measurements Left 2D Measurements +------------------------------------+----------+---------------+----------+ ! Location                            ! Visualized! Compressibility! Thrombosis! +------------------------------------+----------+---------------+----------+ ! Common Femoral                      !Yes       ! Yes            ! None      ! +------------------------------------+----------+---------------+----------+ ! Prox Femoral                        !Yes       ! Yes            ! None      ! +------------------------------------+----------+---------------+----------+ ! Mid Femoral                         !Yes       ! Yes            ! None      ! +------------------------------------+----------+---------------+----------+ ! Dist Femoral                        !Yes       ! Yes            ! None      ! +------------------------------------+----------+---------------+----------+ ! Popliteal                           !Yes       ! Yes            ! None      ! +------------------------------------+----------+---------------+----------+ ! Sapheno Femoral Junction            ! Yes       ! Yes            ! None      ! +------------------------------------+----------+---------------+----------+ ! PTV !Yes       !Yes            ! None      ! +------------------------------------+----------+---------------+----------+ ! Peroneal                            !Yes       ! Yes            ! None      ! +------------------------------------+----------+---------------+----------+ ! Gastroc                             ! Yes       ! Yes            ! None      ! +------------------------------------+----------+---------------+----------+ ! GSV Thigh                           ! Yes       ! Yes            ! None      ! +------------------------------------+----------+---------------+----------+ ! GSV Knee                            ! Yes       ! Yes            ! None      ! +------------------------------------+----------+---------------+----------+ ! GSV Ankle                           ! Yes       ! Yes            ! None      ! +------------------------------------+----------+---------------+----------+ ! SSV                                 ! Yes       ! Yes            ! None      ! +------------------------------------+----------+---------------+----------+ Left Doppler Measurements +--------------------------+---------+------+------------------------------+ ! Location                  ! Signal   !Reflux! Reflux (msec)                 ! +--------------------------+---------+------+------------------------------+ ! Common Femoral            !Pulsatile!      !                              ! +--------------------------+---------+------+------------------------------+ ! Prox Femoral              !Pulsatile!      !                              ! +--------------------------+---------+------+------------------------------+ ! Popliteal                 !Pulsatile!      !                              ! +--------------------------+---------+------+------------------------------+        IMPRESSION:   Primary Problem  Acute combined systolic and diastolic congestive heart failure [facility])    Active Hospital Problems    Diagnosis Date Noted    Polycythemia [D75.1] 07/15/2021    Acute combined systolic and diastolic congestive heart failure (HCC) [I50.41]     Congestive heart failure of unknown etiology ([facility] [I50.9] 07/12/2021    Acute left-sided CHF (congestive heart failure) ([facility] [I50.1] 07/12/2021    Smoking [F17.200] 07/12/2021    Aortic stenosis [I35.0] 07/12/2021    Alcohol abuse [F10.10] 07/12/2021    Hypothyroid [E03.9] 04/20/2015    Low back pain [M54.5] 08/02/2011           RECOMMENDATIONS:  1. I personally reviewed results of lab work-up imaging studies and other relevant clinical data. 2. Reviewed hospitalization record  Reviewed previous medical records  Discussed options diagnosis of polycythemia. Patient has not had polycythemia prior to admission. However hemoglobin and hematocrit have risen significantly during the hospitalization. Clinically I suspect patient polycythemia secondary in nature can be explained by CHF tobacco dependence and partly hemoconcentration from diuresis  I will obtain erythropoietin level and JAK2 analysis  Check UA for hematuria  Discussed with patient that hyperviscosity syndrome can also occur with reactive polycythemia especially once hematocrit starts rising above 60 treatment would be phlebotomy however given patient is relatively asymptomatic and does not have typical signs or symptoms of hyperviscosity syndrome and also risk of decompensation of CHF especially with ejection fraction of 20% I will hold off on phlebotomy. We will update medicine team and cardiology team  Okay to proceed with cardiac catheterization  Continue antiplatelet therapy and pharmacological DVT prophylaxis  Patient will need outpatient follow-up to complete work-up for polycythemia and further management if indicated      Discussed with patient and Nurse. Thank you for asking us to see this patient.           Mendoza Le MD          This note is created with the assistance of a speech recognition program.  While intending to generate a document that actually reflects the content of the visit, the document can still have some errors including those of syntax and sound a like substitutions which may escape proof reading. It such instances, actual meaning can be extrapolated by contextual diversion.

## 2021-07-15 NOTE — PROGRESS NOTES
[facility]  Pre-Procedure Conscious Sedation Data         Pre Procedure Conscious Sedation Data:  ASA Class:                  [] I [] II [x] III [] IV     Mallampati Class:       [] I [] II [x] III [] IV    Lanny Bull MD  Fellow Cardiovascular Disease  [address]

## 2021-07-15 NOTE — PROGRESS NOTES
[facility]   Progress Note                   Date:   7/15/2021  Patient name: Deandra Sanchez  Date of admission:  7/12/2021  9:19 AM  MRN:   [id number]  YOB: 1960  PCP: Lizzy Cornelius MD    Reason for Admission: Congestive heart failure of unknown etiology ([facility] [I50.9]  Acute left-sided CHF (congestive heart failure) ([facility] [I50.1]    Subjective:       Clinical Changes / Abnormalities:   Patient seen and examined sitting up in chair in room with RN present. Denies chest pain or SOB. No respiratory distress on RA. Plan for CATH yesterday cancelled d/t patient was unable to lay fat. SR on tele HR 77        Medications:   Scheduled Meds:   aspirin  81 mg Oral Daily    atorvastatin  20 mg Oral Nightly    furosemide  40 mg Intravenous BID    levothyroxine  75 mcg Oral Daily    sodium chloride flush  5-40 mL Intravenous 2 times per day    enoxaparin  40 mg Subcutaneous Daily    thiamine mononitrate  100 mg Oral Daily    folic acid  1 mg Oral Daily     Continuous Infusions:   dextrose      sodium chloride       CBC:   Recent Labs     07/14/21  0501 07/15/21  0541 07/15/21  0829   WBC 6.4 6.7 7.0   HGB 18.2* 19.9* 20.0*    209 232     BMP:    Recent Labs     07/13/21  0533 07/14/21  0501 07/15/21  0541    134* 138   K 4.3 4.7 4.3    98 95*   CO2 24 25 28   BUN 21 20 23   CREATININE 0.89 1.02 0.90   GLUCOSE 97 88 104*     Hepatic:   Recent Labs     07/12/21  1002   AST 34   ALT 26   BILITOT 0.91   ALKPHOS 135*     Troponin:   Recent Labs     07/12/21  1002 07/12/21  1125   TROPHS 72* 67*     BNP: No results for input(s): BNP in the last 72 hours. Lipids:   Recent Labs     07/13/21  0533   CHOL 131   HDL 36*     INR: No results for input(s): INR in the last 72 hours.     Objective:   Vitals: /72   Pulse 74   Temp 97.8 °F (36.6 °C) (Oral)   Resp 12   Ht 5' 10\" (1.778 m)   Wt 167 lb 14.4 oz (76.2 kg)   SpO2 93%   BMI 24.09 kg/m²   [facility])     Other testicular hypofunction     Generalized osteoarthrosis, unspecified site     Enthesopathy     Hypothyroid     Congestive heart failure of unknown etiology ([facility]     Acute left-sided CHF (congestive heart failure) (HCC)     Smoking     Aortic stenosis     Alcohol abuse     Congestive heart failure ([facility]      Plan of Treatment:   1. HFrEF. Echo 7/12/2021 significantly reduced LVEF of 20-22%. Recommend per Dr Sade Cerda for [facility] and [address] with aortic valve studies to further evaluate. Unable to lay flat for cath yesterday and it was cancelled. Will plan for RHC and LHC. Patient NPO and agrees to proceed with cath. Cath Lab notified. Pt NPO. 2. Severe Aortic stenosis on ECHO 7/12/2021. Cath to evaluated yesterday cancelled d/t unable to lay flat. Will order CATH for today. 3. HTN Controlled  On the low side today 104/72  4. Keep K > 4.0 and Mag > 2.0 K 4.3 today  Will order mag lab.    5. Rest of care managed per Primary Team.         Electronically signed by WILLIAM Quan NP on 7/15/2021 at [address] [facility]      [phone]

## 2021-07-15 NOTE — PROGRESS NOTES
Received post procedure to [facility] to room 12. Assessment obtained. Restrictions reviewed with patient. Post procedure pathway initiated. Bilateral groin and right radial site soft , band aid dry and intact. No hematoma noted. Patient without complaints. Head of bed flat with bilateral leg straight.

## 2021-07-15 NOTE — PROGRESS NOTES
[facility]  Occupational Therapy Not Seen Note    DATE: 7/15/2021    NAME: Taryn Cortez  MRN: [id number]   : 1960      Patient not seen this date for Occupational Therapy due to:     Other: pt leaving with transport for cath lab    Next Scheduled Treatment: check back 2021    Electronically signed by MARGARITO Escudero on 7/15/2021 at 1:14 PM

## 2021-07-15 NOTE — PROGRESS NOTES
Will hold off on coronary angiography in the setting of a Hb of 20.0. Discussed case with Dr. Aly Nielsen. Will consult Heme/Onc for clearance and recommendations prior to moving forward with cardiac testing.     Jose G Brand MD  Fellow Cardiovascular Disease  [facility]

## 2021-07-15 NOTE — PLAN OF CARE
Problem: Falls - Risk of:  Goal: Will remain free from falls  Description: Will remain free from falls  7/15/2021 0411 by Geralyn Gottron, RN  Outcome: Met This Shift  7/14/2021 1531 by Radha Flores RN  Outcome: Ongoing  Goal: Absence of physical injury  Description: Absence of physical injury  7/15/2021 0411 by Geralyn Gottron, RN  Outcome: Met This Shift  7/14/2021 1531 by Radha Flores RN  Outcome: Ongoing     Problem: OXYGENATION/RESPIRATORY FUNCTION  Goal: Patient will maintain patent airway  7/15/2021 0411 by Geralyn Gottron, RN  Outcome: Ongoing  7/14/2021 1531 by Radha Flores RN  Outcome: Ongoing     Problem: HEMODYNAMIC STATUS  Goal: Patient has stable vital signs and fluid balance  7/15/2021 0411 by Geralyn Gottron, RN  Outcome: Ongoing  7/14/2021 1531 by Radha Flores RN  Outcome: Ongoing     Problem: Pain:  Goal: Pain level will decrease  Description: Pain level will decrease  7/15/2021 0411 by Geralyn Gottron, RN  Outcome: Ongoing  7/14/2021 1531 by Radha Flores RN  Outcome: Ongoing  Goal: Control of acute pain  Description: Control of acute pain  7/15/2021 0411 by Geralyn Gottron, RN  Outcome: Ongoing  7/14/2021 1531 by Radha Flores RN  Outcome: Ongoing  Goal: Control of chronic pain  Description: Control of chronic pain  7/15/2021 0411 by Geralyn Gottron, RN  Outcome: Ongoing  7/14/2021 1531 by Radha Flores RN  Outcome: Ongoing

## 2021-07-15 NOTE — PROGRESS NOTES
After obtaining venous access patient had a vasovagal reaction. At that time had a decrease in BP (81/64) and became bradycardiac. He also reported feeling diaphoretic and clammy. Administered a dose of atropine. Will hold off on right and left heart cath for today. Plan to reschedule once BP improves. Will transfer to step-down. Discussed with Dr. Silverio Greenberg.     Santos Nichols MD  Fellow Cardiovascular Disease  [facility]

## 2021-07-15 NOTE — PROGRESS NOTES
Physical Therapy Cancel Note      DATE: 7/15/2021    NAME: Kittie Boxer  MRN: [id number]   : 1960      Patient not seen this date for Physical Therapy due to:     Other: Pt out of the room for \"RIGHT   LEFT HEART CATH / LV   CORS\"      Electronically signed by Luis Cali PTA on 7/15/2021 at 1:19 PM

## 2021-07-16 LAB
ABSOLUTE EOS #: 0.14 K/UL (ref 0–0.44)
ABSOLUTE IMMATURE GRANULOCYTE: <0.03 K/UL (ref 0–0.3)
ABSOLUTE LYMPH #: 1.19 K/UL (ref 1.1–3.7)
ABSOLUTE MONO #: 0.54 K/UL (ref 0.1–1.2)
ANION GAP SERPL CALCULATED.3IONS-SCNC: 15 MMOL/L (ref 9–17)
BASOPHILS # BLD: 1 % (ref 0–2)
BASOPHILS ABSOLUTE: 0.05 K/UL (ref 0–0.2)
BUN BLDV-MCNC: 25 MG/DL (ref 8–23)
BUN/CREAT BLD: ABNORMAL (ref 9–20)
CALCIUM SERPL-MCNC: 9.6 MG/DL (ref 8.6–10.4)
CHLORIDE BLD-SCNC: 97 MMOL/L (ref 98–107)
CO2: 23 MMOL/L (ref 20–31)
CREAT SERPL-MCNC: 1.21 MG/DL (ref 0.7–1.2)
DIFFERENTIAL TYPE: ABNORMAL
EOSINOPHILS RELATIVE PERCENT: 2 % (ref 1–4)
GFR AFRICAN AMERICAN: >60 ML/MIN
GFR NON-AFRICAN AMERICAN: >60 ML/MIN
GFR SERPL CREATININE-BSD FRML MDRD: ABNORMAL ML/MIN/{1.73_M2}
GFR SERPL CREATININE-BSD FRML MDRD: ABNORMAL ML/MIN/{1.73_M2}
GLUCOSE BLD-MCNC: 104 MG/DL (ref 75–110)
GLUCOSE BLD-MCNC: 117 MG/DL (ref 75–110)
GLUCOSE BLD-MCNC: 118 MG/DL (ref 70–99)
GLUCOSE BLD-MCNC: 72 MG/DL (ref 75–110)
GLUCOSE BLD-MCNC: 96 MG/DL (ref 75–110)
HCT VFR BLD CALC: 59.3 % (ref 40.7–50.3)
HEMOGLOBIN: 19.2 G/DL (ref 13–17)
IMMATURE GRANULOCYTES: 0 %
LV EF: 25 %
LVEF MODALITY: NORMAL
LYMPHOCYTES # BLD: 16 % (ref 24–43)
MCH RBC QN AUTO: 31.8 PG (ref 25.2–33.5)
MCHC RBC AUTO-ENTMCNC: 32.4 G/DL (ref 28.4–34.8)
MCV RBC AUTO: 98.3 FL (ref 82.6–102.9)
MONOCYTES # BLD: 7 % (ref 3–12)
NRBC AUTOMATED: 0 PER 100 WBC
PATHOLOGIST REVIEW: NORMAL
PDW BLD-RTO: 15.5 % (ref 11.8–14.4)
PLATELET # BLD: 213 K/UL (ref 138–453)
PLATELET ESTIMATE: ABNORMAL
PMV BLD AUTO: 11.1 FL (ref 8.1–13.5)
POTASSIUM SERPL-SCNC: 4.3 MMOL/L (ref 3.7–5.3)
RBC # BLD: 6.03 M/UL (ref 4.21–5.77)
RBC # BLD: ABNORMAL 10*6/UL
SEG NEUTROPHILS: 74 % (ref 36–65)
SEGMENTED NEUTROPHILS ABSOLUTE COUNT: 5.32 K/UL (ref 1.5–8.1)
SODIUM BLD-SCNC: 135 MMOL/L (ref 135–144)
SURGICAL PATHOLOGY REPORT: NORMAL
WBC # BLD: 7.3 K/UL (ref 3.5–11.3)
WBC # BLD: ABNORMAL 10*3/UL

## 2021-07-16 PROCEDURE — 6360000002 HC RX W HCPCS

## 2021-07-16 PROCEDURE — 85025 COMPLETE CBC W/AUTO DIFF WBC: CPT

## 2021-07-16 PROCEDURE — 6370000000 HC RX 637 (ALT 250 FOR IP): Performed by: STUDENT IN AN ORGANIZED HEALTH CARE EDUCATION/TRAINING PROGRAM

## 2021-07-16 PROCEDURE — 2060000000 HC ICU INTERMEDIATE R&B

## 2021-07-16 PROCEDURE — 2500000003 HC RX 250 WO HCPCS

## 2021-07-16 PROCEDURE — C1894 INTRO/SHEATH, NON-LASER: HCPCS

## 2021-07-16 PROCEDURE — C1769 GUIDE WIRE: HCPCS

## 2021-07-16 PROCEDURE — 6360000004 HC RX CONTRAST MEDICATION

## 2021-07-16 PROCEDURE — 2580000003 HC RX 258: Performed by: NURSE PRACTITIONER

## 2021-07-16 PROCEDURE — 82947 ASSAY GLUCOSE BLOOD QUANT: CPT

## 2021-07-16 PROCEDURE — 93460 R&L HRT ART/VENTRICLE ANGIO: CPT | Performed by: INTERNAL MEDICINE

## 2021-07-16 PROCEDURE — C1751 CATH, INF, PER/CENT/MIDLINE: HCPCS

## 2021-07-16 PROCEDURE — B2111ZZ FLUOROSCOPY OF MULTIPLE CORONARY ARTERIES USING LOW OSMOLAR CONTRAST: ICD-10-PCS | Performed by: INTERNAL MEDICINE

## 2021-07-16 PROCEDURE — 2709999900 HC NON-CHARGEABLE SUPPLY

## 2021-07-16 PROCEDURE — 4A023N8 MEASUREMENT OF CARDIAC SAMPLING AND PRESSURE, BILATERAL, PERCUTANEOUS APPROACH: ICD-10-PCS | Performed by: INTERNAL MEDICINE

## 2021-07-16 PROCEDURE — B2151ZZ FLUOROSCOPY OF LEFT HEART USING LOW OSMOLAR CONTRAST: ICD-10-PCS | Performed by: INTERNAL MEDICINE

## 2021-07-16 PROCEDURE — B4101ZZ FLUOROSCOPY OF ABDOMINAL AORTA USING LOW OSMOLAR CONTRAST: ICD-10-PCS | Performed by: INTERNAL MEDICINE

## 2021-07-16 PROCEDURE — 99232 SBSQ HOSP IP/OBS MODERATE 35: CPT | Performed by: INTERNAL MEDICINE

## 2021-07-16 PROCEDURE — 6370000000 HC RX 637 (ALT 250 FOR IP): Performed by: INTERNAL MEDICINE

## 2021-07-16 PROCEDURE — 75716 ARTERY X-RAYS ARMS/LEGS: CPT | Performed by: INTERNAL MEDICINE

## 2021-07-16 PROCEDURE — 36415 COLL VENOUS BLD VENIPUNCTURE: CPT

## 2021-07-16 PROCEDURE — 2580000003 HC RX 258: Performed by: STUDENT IN AN ORGANIZED HEALTH CARE EDUCATION/TRAINING PROGRAM

## 2021-07-16 PROCEDURE — 80048 BASIC METABOLIC PNL TOTAL CA: CPT

## 2021-07-16 RX ORDER — SODIUM CHLORIDE 0.9 % (FLUSH) 0.9 %
5-40 SYRINGE (ML) INJECTION PRN
Status: DISCONTINUED | OUTPATIENT
Start: 2021-07-16 | End: 2021-07-17 | Stop reason: HOSPADM

## 2021-07-16 RX ORDER — SODIUM CHLORIDE 0.9 % (FLUSH) 0.9 %
5-40 SYRINGE (ML) INJECTION EVERY 12 HOURS SCHEDULED
Status: DISCONTINUED | OUTPATIENT
Start: 2021-07-16 | End: 2021-07-17 | Stop reason: HOSPADM

## 2021-07-16 RX ORDER — SODIUM CHLORIDE 9 MG/ML
INJECTION, SOLUTION INTRAVENOUS CONTINUOUS
Status: DISCONTINUED | OUTPATIENT
Start: 2021-07-16 | End: 2021-07-17 | Stop reason: HOSPADM

## 2021-07-16 RX ORDER — SODIUM CHLORIDE 9 MG/ML
25 INJECTION, SOLUTION INTRAVENOUS PRN
Status: DISCONTINUED | OUTPATIENT
Start: 2021-07-16 | End: 2021-07-17 | Stop reason: HOSPADM

## 2021-07-16 RX ORDER — ACETAMINOPHEN 325 MG/1
650 TABLET ORAL EVERY 4 HOURS PRN
Status: DISCONTINUED | OUTPATIENT
Start: 2021-07-16 | End: 2021-07-17 | Stop reason: HOSPADM

## 2021-07-16 RX ADMIN — LEVOTHYROXINE SODIUM 75 MCG: 75 TABLET ORAL at 10:28

## 2021-07-16 RX ADMIN — FOLIC ACID 1 MG: 1 TABLET ORAL at 10:28

## 2021-07-16 RX ADMIN — HYDROCODONE BITARTRATE AND ACETAMINOPHEN 1 TABLET: 5; 325 TABLET ORAL at 21:46

## 2021-07-16 RX ADMIN — HYDROCODONE BITARTRATE AND ACETAMINOPHEN 1 TABLET: 5; 325 TABLET ORAL at 05:46

## 2021-07-16 RX ADMIN — HYDROCODONE BITARTRATE AND ACETAMINOPHEN 1 TABLET: 5; 325 TABLET ORAL at 11:55

## 2021-07-16 RX ADMIN — ATORVASTATIN CALCIUM 20 MG: 20 TABLET, FILM COATED ORAL at 00:45

## 2021-07-16 RX ADMIN — ATORVASTATIN CALCIUM 20 MG: 20 TABLET, FILM COATED ORAL at 21:47

## 2021-07-16 RX ADMIN — Medication 100 MG: at 10:28

## 2021-07-16 RX ADMIN — SODIUM CHLORIDE, PRESERVATIVE FREE 10 ML: 5 INJECTION INTRAVENOUS at 10:43

## 2021-07-16 RX ADMIN — SODIUM CHLORIDE: 9 INJECTION, SOLUTION INTRAVENOUS at 12:30

## 2021-07-16 RX ADMIN — ASPIRIN 81 MG: 81 TABLET, CHEWABLE ORAL at 10:28

## 2021-07-16 ASSESSMENT — PAIN SCALES - GENERAL
PAINLEVEL_OUTOF10: 8
PAINLEVEL_OUTOF10: 8
PAINLEVEL_OUTOF10: 4
PAINLEVEL_OUTOF10: 7
PAINLEVEL_OUTOF10: 8

## 2021-07-16 NOTE — PROGRESS NOTES
[facility]  Occupational Therapy Not Seen Note    DATE: 2021    NAME: Kanika Barrios  MRN: [id number]   : 1960      Patient not seen this date for Occupational Therapy due to: Other: RLE doppler ordered to rule out DVT; planned reattempt for cardiac cath today.     Next Scheduled Treatment: Check     Electronically signed by Stefan Charlton S/HARMONY on 2021 at 9:59 AM

## 2021-07-16 NOTE — PROGRESS NOTES
Congestive Heart Failure Education completed and charted. CHF booklet given. Patient was receptive to education. Discussed the  importance of medication compliance. Discussed the importance of a heart healthy diet. Discussed 2000 mg sodium-restricted daily diet. Patient instructed to limit fluid intake to  1.5 to 2 liters per day. Patient instructed to weigh self at the same time of each day each morning, reinforced teaching to monitor for 3-5 lb weight increase over 1-2 days notify physician if change noted. Signs and symptoms of CHF discussed with patient, such as feeling more tired than normal, feeling short of breath, coughing that increases when lying down, sudden weight gain, swelling of the feet, legs or belly. Patient verbalized understanding to notify physician office if these symptoms occur. Writer offered to obtain CHF Clinic referral for pt but he declined at this time. States \" I'm overwhelmed right now \" States he needs to think about it and will ask his cardiologist about it and decide later. Ad Armstrong

## 2021-07-16 NOTE — PLAN OF CARE
Problem: Falls - Risk of:  Goal: Will remain free from falls  7/16/2021 0841 by Laurine Romberg, RN  Outcome: Met This Shift  7/16/2021 0841 by Laurine Romberg, RN  Outcome: Met This Shift     Problem: OXYGENATION/RESPIRATORY FUNCTION  Goal: Patient will maintain patent airway  7/16/2021 0841 by Laurine Romberg, RN  Outcome: Met This Shift  7/16/2021 0841 by Laurine Romberg, RN  Outcome: Met This Shift     Problem: Pain:  Description: Pain management should include both nonpharmacologic and pharmacologic interventions.   Goal: Pain level will decrease  7/16/2021 0841 by Laurine Romberg, RN  Outcome: Ongoing  7/16/2021 0841 by Laurine Romberg, RN  Outcome: Ongoing

## 2021-07-16 NOTE — PROGRESS NOTES
Physical Therapy        Physical Therapy Cancel Note      DATE: 2021    NAME: Tori Lee  MRN: [id number]   : 1960      Patient not seen this date for Physical Therapy due to: Other: RLE doppler ordered to rule out DVT; planned reattempt for cardiac cath today.       Electronically signed by Marly Denny PTA on 2021 at 10:51 AM

## 2021-07-16 NOTE — PROGRESS NOTES
Patient admitted, consent signed, all questions answered. Pt ready for procedure. Bed in low position, call light to reach with side rails up 2 of 2.  nO FAMILY at bedside with patient.

## 2021-07-16 NOTE — PROGRESS NOTES
Today's Date: 7/16/2021  Patient Name: Kittie Boxer  Date of admission: 7/12/2021  9:19 AM  Patient's age: 63 y.o., 1960  Admission Dx: Congestive heart failure of unknown etiology ([facility] [I50.9]  Acute left-sided CHF (congestive heart failure) ([facility] [I50.1]    Reason for Consult: management recommendations  Requesting Physician: Deidra Colbert MD    CHIEF COMPLAINT: Shortness of breath. Leg swelling. History Obtained From:  patient, electronic medical record    Interval hx :     Pt seen and examined   labs an vitals reviewed  Denies HA , vision changes  Denies neuropathy   Cardiac cath planned for today     HISTORY OF PRESENT ILLNESS:      The patient is a 63 y.o.  male who is admitted to the hospital for  chief complaint of lower extremity swelling and shortness of breath. Denies any chest pain. Denies any vision changes headaches or strokelike symptoms. Echocardiogram done shows aortic stenosis with low ejection fraction. Patient has not had cardiac work-up done before. Patient started on diuretics and cardiac cath being planned. Hematology oncology team consulted due to rising hemoglobin and hematocrit. Review of record reveals patient hemoglobin was 14.9 back in 2015. Repeat hemoglobin from 2016 was 15.9 more recently after admission patient hemoglobin was 16 with hematocrit of 49. During hospitalization patient hemoglobin has been gradually rising and was reported to be 20 on 7/15/2021. Reticulocyte count is 2.4. Patient's chest x-ray showed cardiomegaly with evidence of CHF and pulmonary edema. Patient is currently on aspirin Lovenox. Patient also receiving Lasix 40 mg IV twice daily. Per record there is also concern regarding poor alcohol abuse. Patient denies being a heavy smoker. Echocardiogram showed ejection fraction 20% and significant aortic valve calcification. Patient TSH was within range at 4.       Past Medical History:   has a past medical history of Osteoarthritis. Past Surgical History: Denies any previous history of hypertension diabetes. Positive history of hypothyroidism    Medications:    Prior to Admission medications    Medication Sig Start Date End Date Taking? Authorizing Provider   HYDROcodone-acetaminophen (NORCO) 7.5-325 MG per tablet Take 1 tablet by mouth every 6 hours as needed for Pain.    Yes Historical Provider, MD   meloxicam (MOBIC) 7.5 MG tablet TAKE 1 TAB BY MOUTH ONCE A DAY 4/13/21  Yes Daniele Lam MD   levothyroxine (SYNTHROID) 75 MCG tablet TAKE 1 TAB BY MOUTH ONCE A DAY 4/13/21  Yes Daniele Lam MD     Current Facility-Administered Medications   Medication Dose Route Frequency Provider Last Rate Last Admin    0.9 % sodium chloride infusion   Intravenous Continuous Ulysees Spindle, APRN - CNP 50 mL/hr at 07/16/21 1230 New Bag at 07/16/21 1230    glucose (GLUTOSE) 40 % oral gel 15 g  15 g Oral PRN oLy Vital MD        dextrose 50 % IV solution  12.5 g Intravenous PRN Loy Vital MD        glucagon (rDNA) injection 1 mg  1 mg Intramuscular PRN Loy Vital MD        dextrose 5 % solution  100 mL/hr Intravenous PRN Loy Vital MD        aspirin chewable tablet 81 mg  81 mg Oral Daily Loy Vital MD   81 mg at 07/16/21 1028    atorvastatin (LIPITOR) tablet 20 mg  20 mg Oral Nightly Loy Vital MD   20 mg at 07/16/21 0045    [Held by provider] furosemide (LASIX) injection 40 mg  40 mg Intravenous BID Buck Flores MD   40 mg at 07/15/21 2110    levothyroxine (SYNTHROID) tablet 75 mcg  75 mcg Oral Daily Lew Goff MD   75 mcg at 07/16/21 1028    sodium chloride flush 0.9 % injection 5-40 mL  5-40 mL Intravenous 2 times per day Lew Goff MD   10 mL at 07/16/21 1043    sodium chloride flush 0.9 % injection 5-40 mL  5-40 mL Intravenous PRN Lew Goff MD        0.9 % sodium chloride infusion  25 mL Intravenous PRN Lew Goff MD        enoxaparin (LOVENOX) injection 40 mg  40 mg Subcutaneous Daily Florencio Pop Lily Casas MD   40 mg at 07/13/21 0834    ondansetron (ZOFRAN-ODT) disintegrating tablet 4 mg  4 mg Oral Q8H PRN Allyssa Honeycutt MD        Or    ondansetron [facility]) injection 4 mg  4 mg Intravenous Q6H PRN Allyssa Honeycutt MD        polyethylene glycol (GLYCOLAX) packet 17 g  17 g Oral Daily PRN Allyssa Honeycutt MD        acetaminophen (TYLENOL) tablet 650 mg  650 mg Oral Q6H PRN Allyssa Honeycutt MD        Or    acetaminophen (TYLENOL) suppository 650 mg  650 mg Rectal Q6H PRN Allyssa Honeycutt MD        HYDROcodone-acetaminophen [facility]) 5-325 MG per tablet 1 tablet  1 tablet Oral Q6H PRN Angela Manzanares MD   1 tablet at 07/16/21 1155    thiamine mononitrate tablet 100 mg  100 mg Oral Daily Na Cowan MD   100 mg at 08/43/94 1066    folic acid (FOLVITE) tablet 1 mg  1 mg Oral Daily Na Cowan MD   1 mg at 07/16/21 1028       Allergies:  Patient has no known allergies. Social History:   reports that he has been smoking. He has a 10.00 pack-year smoking history. He has never used smokeless tobacco. He reports current alcohol use. He reports that he does not use drugs. Family History: family history is not on file. REVIEW OF SYSTEMS:      Constitutional: No fever or chills. No night sweats, no weight loss   Eyes: No eye discharge, double vision, or eye pain   HEENT: negative for sore mouth, sore throat, hoarseness and voice change   Respiratory: negative for cough , sputum, dyspnea, wheezing, hemoptysis, chest pain   Cardiovascular: negative for chest pain, dyspnea, palpitations, orthopnea, PND   Gastrointestinal: negative for nausea, vomiting, diarrhea, constipation, abdominal pain, Dysphagia, hematemesis and hematochezia   Genitourinary: negative for frequency, dysuria, nocturia, urinary incontinence, and hematuria   Integument: negative for rash, skin lesions, bruises.    Hematologic/Lymphatic: negative for easy bruising, bleeding, lymphadenopathy, or petechiae   Endocrine: negative for heat or cold intolerance,weight changes, change in bowel habits and hair loss   Musculoskeletal: negative for myalgias, arthralgias, pain, joint swelling,and bone pain   Neurological: negative for headaches, dizziness, seizures, weakness, numbness    PHYSICAL EXAM:        /80   Pulse 69   Temp 97.9 °F (36.6 °C) (Oral)   Resp 15   Ht 5' 10\" (1.778 m)   Wt 163 lb (73.9 kg)   SpO2 98%   BMI 23.39 kg/m²    Temp (24hrs), Av °F (36.7 °C), Min:97.9 °F (36.6 °C), Max:98.2 °F (36.8 °C)      General appearance - well appearing, no in pain or distress   Mental status - alert and cooperative   Eyes - pupils equal and reactive, extraocular eye movements intact   Ears - bilateral TM's and external ear canals normal   Mouth - mucous membranes moist, pharynx normal without lesions   Neck - supple, no significant adenopathy   Lymphatics - no palpable lymphadenopathy, no hepatosplenomegaly   Chest - clear to auscultation, no wheezes, rales or rhonchi, symmetric air entry   Heart - normal rate, regular rhythm, normal S1, S2, no murmurs  Abdomen - soft, nontender, nondistended, no masses or organomegaly   Neurological - alert, oriented, normal speech, no focal findings or movement disorder noted   Musculoskeletal - no joint tenderness, deformity or swelling   Extremities - peripheral pulses normal, no pedal edema, no clubbing or cyanosis   Skin - normal coloration and turgor, no rashes, no suspicious skin lesions noted ,      DATA:      Labs:     Results for orders placed or performed during the hospital encounter of 21   CBC Auto Differential   Result Value Ref Range    WBC 6.2 3.5 - 11.3 k/uL    RBC 4.98 4.21 - 5.77 m/uL    Hemoglobin 16.0 13.0 - 17.0 g/dL    Hematocrit 49.3 40.7 - 50.3 %    MCV 99.0 82.6 - 102.9 fL    MCH 32.1 25.2 - 33.5 pg    MCHC 32.5 28.4 - 34.8 g/dL    RDW 15.8 (H) 11.8 - 14.4 %    Platelets 421 482 - 591 k/uL    MPV 10.7 8.1 - 13.5 fL    NRBC Automated 0.0 0.0 per 100 WBC    Differential Type NOT REPORTED     Seg Neutrophils 73 (H) 36 - 65 %    Lymphocytes 19 (L) 24 - 43 %    Monocytes 6 3 - 12 %    Eosinophils % 1 1 - 4 %    Basophils 1 0 - 2 %    Immature Granulocytes 0 0 %    Segs Absolute 4.57 1.50 - 8.10 k/uL    Absolute Lymph # 1.15 1.10 - 3.70 k/uL    Absolute Mono # 0.37 0.10 - 1.20 k/uL    Absolute Eos # 0.09 0.00 - 0.44 k/uL    Basophils Absolute 0.03 0.00 - 0.20 k/uL    Absolute Immature Granulocyte <0.03 0.00 - 0.30 k/uL    WBC Morphology NOT REPORTED     RBC Morphology ANISOCYTOSIS PRESENT     Platelet Estimate NOT REPORTED    Basic Metabolic Panel w/ Reflex to MG   Result Value Ref Range    Glucose 113 (H) 70 - 99 mg/dL    BUN 23 8 - 23 mg/dL    CREATININE 0.72 0.70 - 1.20 mg/dL    Bun/Cre Ratio NOT REPORTED 9 - 20    Calcium 9.3 8.6 - 10.4 mg/dL    Sodium 139 135 - 144 mmol/L    Potassium 4.3 3.7 - 5.3 mmol/L    Chloride 107 98 - 107 mmol/L    CO2 23 20 - 31 mmol/L    Anion Gap 9 9 - 17 mmol/L    GFR Non-African American >60 >60 mL/min    GFR African American >60 >60 mL/min    GFR Comment          GFR Staging NOT REPORTED    Hepatic Function Panel   Result Value Ref Range    Albumin 3.6 3.5 - 5.2 g/dL    Alkaline Phosphatase 135 (H) 40 - 129 U/L    ALT 26 5 - 41 U/L    AST 34 <40 U/L    Total Bilirubin 0.91 0.3 - 1.2 mg/dL    Bilirubin, Direct 0.33 (H) <0.31 mg/dL    Bilirubin, Indirect 0.58 0.00 - 1.00 mg/dL    Total Protein 6.7 6.4 - 8.3 g/dL    Globulin NOT REPORTED 1.5 - 3.8 g/dL    Albumin/Globulin Ratio 1.2 1.0 - 2.5   Troponin   Result Value Ref Range    Troponin, High Sensitivity 67 (HH) 0 - 22 ng/L    Troponin T NOT REPORTED <0.03 ng/mL    Troponin Interp NOT REPORTED    Brain Natriuretic Peptide   Result Value Ref Range    Pro-BNP 6,364 (H) <300 pg/mL    BNP Interpretation Pro-BNP Reference Range:    Troponin   Result Value Ref Range    Troponin, High Sensitivity 72 (HH) 0 - 22 ng/L    Troponin T NOT REPORTED <0.03 ng/mL    Troponin Interp NOT REPORTED    TSH with Reflex   Result Value Ref Range    TSH 4.00 0.30 - 5.00 mIU/L   Basic Metabolic Panel w/ Reflex to MG   Result Value Ref Range    Glucose 97 70 - 99 mg/dL    BUN 21 8 - 23 mg/dL    CREATININE 0.89 0.70 - 1.20 mg/dL    Bun/Cre Ratio NOT REPORTED 9 - 20    Calcium 8.7 8.6 - 10.4 mg/dL    Sodium 137 135 - 144 mmol/L    Potassium 4.3 3.7 - 5.3 mmol/L    Chloride 105 98 - 107 mmol/L    CO2 24 20 - 31 mmol/L    Anion Gap 8 (L) 9 - 17 mmol/L    GFR Non-African American >60 >60 mL/min    GFR African American >60 >60 mL/min    GFR Comment          GFR Staging NOT REPORTED    CBC auto differential   Result Value Ref Range    WBC 5.7 3.5 - 11.3 k/uL    RBC 4.90 4.21 - 5.77 m/uL    Hemoglobin 15.5 13.0 - 17.0 g/dL    Hematocrit 48.6 40.7 - 50.3 %    MCV 99.2 82.6 - 102.9 fL    MCH 31.6 25.2 - 33.5 pg    MCHC 31.9 28.4 - 34.8 g/dL    RDW 15.8 (H) 11.8 - 14.4 %    Platelets 540 724 - 760 k/uL    MPV 11.0 8.1 - 13.5 fL    NRBC Automated 0.0 0.0 per 100 WBC    Differential Type NOT REPORTED     Seg Neutrophils 68 (H) 36 - 65 %    Lymphocytes 23 (L) 24 - 43 %    Monocytes 6 3 - 12 %    Eosinophils % 2 1 - 4 %    Basophils 1 0 - 2 %    Immature Granulocytes 0 0 %    Segs Absolute 3.90 1.50 - 8.10 k/uL    Absolute Lymph # 1.32 1.10 - 3.70 k/uL    Absolute Mono # 0.32 0.10 - 1.20 k/uL    Absolute Eos # 0.11 0.00 - 0.44 k/uL    Basophils Absolute 0.03 0.00 - 0.20 k/uL    Absolute Immature Granulocyte <0.03 0.00 - 0.30 k/uL    WBC Morphology NOT REPORTED     RBC Morphology ANISOCYTOSIS PRESENT     Platelet Estimate NOT REPORTED    HEMOGLOBIN A1C   Result Value Ref Range    Hemoglobin A1C 6.0 4.0 - 6.0 %    Estimated Avg Glucose 126 mg/dL   LIPID PANEL   Result Value Ref Range    Cholesterol 131 <200 mg/dL    HDL 36 (L) >40 mg/dL    LDL Cholesterol 80 0 - 130 mg/dL    Chol/HDL Ratio 3.6 <5    Triglycerides 74 <150 mg/dL    VLDL NOT REPORTED 1 - 30 mg/dL   Basic Metabolic Panel w/ Reflex to MG   Result Value Ref Range    Glucose 88 70 - 99 mg/dL    BUN 20 8 - 23 mg/dL    CREATININE 1.02 0.70 - 1.20 mg/dL    Bun/Cre Ratio NOT REPORTED 9 - 20    Calcium 10.0 8.6 - 10.4 mg/dL    Sodium 134 (L) 135 - 144 mmol/L    Potassium 4.7 3.7 - 5.3 mmol/L    Chloride 98 98 - 107 mmol/L    CO2 25 20 - 31 mmol/L    Anion Gap 11 9 - 17 mmol/L    GFR Non-African American >60 >60 mL/min    GFR African American >60 >60 mL/min    GFR Comment          GFR Staging NOT REPORTED    CBC auto differential   Result Value Ref Range    WBC 6.4 3.5 - 11.3 k/uL    RBC 5.67 4.21 - 5.77 m/uL    Hemoglobin 18.2 (H) 13.0 - 17.0 g/dL    Hematocrit 56.8 (H) 40.7 - 50.3 %    .2 82.6 - 102.9 fL    MCH 32.1 25.2 - 33.5 pg    MCHC 32.0 28.4 - 34.8 g/dL    RDW 15.6 (H) 11.8 - 14.4 %    Platelets 787 676 - 559 k/uL    MPV 11.0 8.1 - 13.5 fL    NRBC Automated 0.0 0.0 per 100 WBC    Differential Type NOT REPORTED     Seg Neutrophils 68 (H) 36 - 65 %    Lymphocytes 23 (L) 24 - 43 %    Monocytes 6 3 - 12 %    Eosinophils % 2 1 - 4 %    Basophils 1 0 - 2 %    Immature Granulocytes 0 0 %    Segs Absolute 4.30 1.50 - 8.10 k/uL    Absolute Lymph # 1.46 1.10 - 3.70 k/uL    Absolute Mono # 0.39 0.10 - 1.20 k/uL    Absolute Eos # 0.12 0.00 - 0.44 k/uL    Basophils Absolute 0.06 0.00 - 0.20 k/uL    Absolute Immature Granulocyte <0.03 0.00 - 0.30 k/uL    WBC Morphology NOT REPORTED     RBC Morphology ANISOCYTOSIS PRESENT     Platelet Estimate NOT REPORTED    Basic Metabolic Panel w/ Reflex to MG   Result Value Ref Range    Glucose 104 (H) 70 - 99 mg/dL    BUN 23 8 - 23 mg/dL    CREATININE 0.90 0.70 - 1.20 mg/dL    Bun/Cre Ratio NOT REPORTED 9 - 20    Calcium 10.2 8.6 - 10.4 mg/dL    Sodium 138 135 - 144 mmol/L    Potassium 4.3 3.7 - 5.3 mmol/L    Chloride 95 (L) 98 - 107 mmol/L    CO2 28 20 - 31 mmol/L    Anion Gap 15 9 - 17 mmol/L    GFR Non-African American >60 >60 mL/min    GFR African American >60 >60 mL/min    GFR Comment          GFR Staging NOT REPORTED    CBC auto differential   Result Value Ref Range    WBC 6.7 3.5 - 11.3 k/uL    RBC 6.23 (H) 4.21 - 5.77 m/uL    Hemoglobin 19.9 (H) 13.0 - 17.0 g/dL    Hematocrit 61.1 (H) 40.7 - 50.3 %    MCV 98.1 82.6 - 102.9 fL    MCH 31.9 25.2 - 33.5 pg    MCHC 32.6 28.4 - 34.8 g/dL    RDW 16.1 (H) 11.8 - 14.4 %    Platelets 673 780 - 105 k/uL    MPV 11.1 8.1 - 13.5 fL    NRBC Automated 0.0 0.0 per 100 WBC    Differential Type NOT REPORTED     Seg Neutrophils 71 (H) 36 - 65 %    Lymphocytes 17 (L) 24 - 43 %    Monocytes 9 3 - 12 %    Eosinophils % 2 1 - 4 %    Basophils 1 0 - 2 %    Immature Granulocytes 0 0 %    Segs Absolute 4.72 1.50 - 8.10 k/uL    Absolute Lymph # 1.15 1.10 - 3.70 k/uL    Absolute Mono # 0.59 0.10 - 1.20 k/uL    Absolute Eos # 0.10 0.00 - 0.44 k/uL    Basophils Absolute 0.07 0.00 - 0.20 k/uL    Absolute Immature Granulocyte <0.03 0.00 - 0.30 k/uL    WBC Morphology NOT REPORTED     RBC Morphology ANISOCYTOSIS PRESENT     Platelet Estimate NOT REPORTED    PERIPHERAL BLOOD SMEAR, PATH REVIEW   Result Value Ref Range    Pathologist Review SEE REPORT    URINALYSIS WITH MICROSCOPIC   Result Value Ref Range    Color, UA DARK YELLOW (A) YELLOW    Turbidity UA CLEAR CLEAR    Glucose, Ur NEGATIVE NEGATIVE    Bilirubin Urine NEGATIVE NEGATIVE    Ketones, Urine TRACE (A) NEGATIVE    Specific Gravity, UA 1.021 1.005 - 1.030    Urine Hgb NEGATIVE NEGATIVE    pH, UA 6.5 5.0 - 8.0    Protein, UA 3+ (A) NEGATIVE    Urobilinogen, Urine Normal Normal    Nitrite, Urine NEGATIVE NEGATIVE    Leukocyte Esterase, Urine NEGATIVE NEGATIVE    -          WBC, UA 0 TO 2 0 - 5 /HPF    RBC, UA 2 TO 5 0 - 4 /HPF    Casts UA  0 - 8 /LPF     10 TO 20 HYALINE Reference range defined for non-centrifuged specimen.     Crystals, UA NOT REPORTED None /HPF    Epithelial Cells UA 0 TO 2 0 - 5 /HPF    Renal Epithelial, UA NOT REPORTED 0 /HPF    Bacteria, UA NOT REPORTED None    Mucus, UA NOT REPORTED None    Trichomonas, UA NOT REPORTED None    Amorphous, UA NOT REPORTED None    Other Observations UA NOT REPORTED NOT REQ.     Yeast, UA NOT REPORTED None   Reticulocytes   Result Value Ref Range    Retic % 2.4 (H) 0.5 - 1.9 %    Absolute Retic # 0.150 (H) 0.030 - 0.080 M/uL    Immature Retic Fract 14.200 2.7 - 18.3 %    Retic Hemoglobin 37.3 (H) 28.2 - 35.7 pg   CBC Auto Differential   Result Value Ref Range    WBC 7.0 3.5 - 11.3 k/uL    RBC 6.19 (H) 4.21 - 5.77 m/uL    Hemoglobin 20.0 (H) 13.0 - 17.0 g/dL    Hematocrit 61.3 (H) 40.7 - 50.3 %    MCV 99.0 82.6 - 102.9 fL    MCH 32.3 25.2 - 33.5 pg    MCHC 32.6 28.4 - 34.8 g/dL    RDW 16.7 (H) 11.8 - 14.4 %    Platelets 676 627 - 509 k/uL    MPV 10.9 8.1 - 13.5 fL    NRBC Automated 0.4 (H) 0.0 per 100 WBC    Differential Type NOT REPORTED     WBC Morphology NOT REPORTED     RBC Morphology ANISOCYTOSIS PRESENT     Platelet Estimate NOT REPORTED     Seg Neutrophils 73 (H) 36 - 65 %    Lymphocytes 18 (L) 24 - 43 %    Monocytes 7 3 - 12 %    Eosinophils % 1 1 - 4 %    Basophils 1 0 - 2 %    Immature Granulocytes 0 0 %    Segs Absolute 5.14 1.50 - 8.10 k/uL    Absolute Lymph # 1.24 1.10 - 3.70 k/uL    Absolute Mono # 0.47 0.10 - 1.20 k/uL    Absolute Eos # 0.10 0.00 - 0.44 k/uL    Basophils Absolute 0.06 0.00 - 0.20 k/uL    Absolute Immature Granulocyte <0.03 0.00 - 0.30 k/uL   Magnesium   Result Value Ref Range    Magnesium 2.3 1.6 - 2.6 mg/dL   Basic Metabolic Panel w/ Reflex to MG   Result Value Ref Range    Glucose 118 (H) 70 - 99 mg/dL    BUN 25 (H) 8 - 23 mg/dL    CREATININE 1.21 (H) 0.70 - 1.20 mg/dL    Bun/Cre Ratio NOT REPORTED 9 - 20    Calcium 9.6 8.6 - 10.4 mg/dL    Sodium 135 135 - 144 mmol/L    Potassium 4.3 3.7 - 5.3 mmol/L    Chloride 97 (L) 98 - 107 mmol/L    CO2 23 20 - 31 mmol/L    Anion Gap 15 9 - 17 mmol/L    GFR Non-African American >60 >60 mL/min    GFR African American >60 >60 mL/min    GFR Comment          GFR Staging NOT REPORTED    CBC auto differential   Result Value Ref Range    WBC 7.3 3.5 - 11.3 k/uL    RBC 6.03 (H) 4.21 - 5.77 m/uL Hemoglobin 19.2 (H) 13.0 - 17.0 g/dL    Hematocrit 59.3 (H) 40.7 - 50.3 %    MCV 98.3 82.6 - 102.9 fL    MCH 31.8 25.2 - 33.5 pg    MCHC 32.4 28.4 - 34.8 g/dL    RDW 15.5 (H) 11.8 - 14.4 %    Platelets 872 930 - 469 k/uL    MPV 11.1 8.1 - 13.5 fL    NRBC Automated 0.0 0.0 per 100 WBC    Differential Type NOT REPORTED     Seg Neutrophils 74 (H) 36 - 65 %    Lymphocytes 16 (L) 24 - 43 %    Monocytes 7 3 - 12 %    Eosinophils % 2 1 - 4 %    Basophils 1 0 - 2 %    Immature Granulocytes 0 0 %    Segs Absolute 5.32 1.50 - 8.10 k/uL    Absolute Lymph # 1.19 1.10 - 3.70 k/uL    Absolute Mono # 0.54 0.10 - 1.20 k/uL    Absolute Eos # 0.14 0.00 - 0.44 k/uL    Basophils Absolute 0.05 0.00 - 0.20 k/uL    Absolute Immature Granulocyte <0.03 0.00 - 0.30 k/uL    WBC Morphology NOT REPORTED     RBC Morphology ANISOCYTOSIS PRESENT     Platelet Estimate NOT REPORTED    Surgical Pathology   Result Value Ref Range    Surgical Pathology Report       [id number]  PowerMessage  [facility]  ANATOMIC PATHOLOGY  [address] [place] [address], [place] [address]. [facility], [address]  [phone]  Fax: [phone]  SURGICAL PATHOLOGY CONSULTATION    Patient Name: Janes Golden  MR#: [id number]  Specimen #[id number]    Procedures/Addenda  PERIPHERAL BLOOD REPORT     Date Ordered:     7/16/2021     Status:  Signed Out       Date Complete:     7/16/2021     By: Alex Armijo M.D. Date Reported:     7/16/2021       INTERPRETATION  PERIPHERAL BLOOD:   INCREASED HEMOGLOBIN/HEMATOCRIT;R/O PRIMARY VERSUS SECONDARY  POLYCYTHEMIA. RESULTS-COMMENTS  PERIPHERAL BLOOD STUDY    CBC: Please see the electronic health record for CBC parameters  ([id number], 07/15/2021, 08:29). PLATELETS: Platelets show normal morphology. LEUKOCYTES: White blood cells show normal morphology. There are no  blasts.     ERYTHROCYTES: Red blood cells show variation in size, otherwise normal     Alex Armijo M.D. Source:  1: PERIPHERAL BLOOD FOR FLOW CYTOMETRY     POC Glucose Fingerstick   Result Value Ref Range    POC Glucose 85 75 - 110 mg/dL   POC Glucose Fingerstick   Result Value Ref Range    POC Glucose 93 75 - 110 mg/dL   POC Glucose Fingerstick   Result Value Ref Range    POC Glucose 118 (H) 75 - 110 mg/dL   POC Glucose Fingerstick   Result Value Ref Range    POC Glucose 114 (H) 75 - 110 mg/dL   POC Glucose Fingerstick   Result Value Ref Range    POC Glucose 132 (H) 75 - 110 mg/dL   POC Glucose Fingerstick   Result Value Ref Range    POC Glucose 117 (H) 75 - 110 mg/dL   POC Glucose Fingerstick   Result Value Ref Range    POC Glucose 72 (L) 75 - 110 mg/dL   POC Glucose Fingerstick   Result Value Ref Range    POC Glucose 96 75 - 110 mg/dL   EKG 12 Lead   Result Value Ref Range    Ventricular Rate 72 BPM    Atrial Rate 72 BPM    P-R Interval 190 ms    QRS Duration 132 ms    Q-T Interval 438 ms    QTc Calculation (Bazett) 479 ms    P Axis 52 degrees    R Axis -54 degrees    T Axis 131 degrees         IMAGING DATA:    ECHO Complete 2D W Doppler W Color    Result Date: 7/13/2021  Transthoracic Echocardiography Report (TTE)  Patient Name [facility]    Date of Study               07/12/2021               [facility]   Date of      1960  Gender                      Male  Birth   Age          63 year(s)  Race                        Unknown   Room Number  0328        Height:                     70 inch, 177.8 cm   Corporate ID [id number]    Weight:                     196 pounds, 88.9 kg  #   Patient Acct [de-identified]   BSA:          2.07 m^2      BMI:      28.12  #                                                              kg/m^2   MR #         [id number]     Sonographer                 Shanell Buckley   Accession #  [id number]  Interpreting Physician      [address] [place] [address]   Fellow                   Referring Nurse                           Practitioner   Interpreting             Referring Physician         Sonya Velazquez MD Fellow  Type of Study   TTE procedure:2D Echocardiogram, M-Mode, Doppler, Color Doppler. Procedure Date Date: 07/12/2021 Start: 03:35 PM Study Location: [facility] Technical Quality: Good visualization Indications:Congestive heart failure. History / Tech. Comments: Procedure explained to patient. Patient Status: Inpatient Height: 70 inches Weight: 196 pounds BSA: 2.07 m^2 BMI: 28.12 kg/m^2 HR: 57 bpm CONCLUSIONS Summary Left ventricle is enlarged. Global left ventricular systolic function is severely reduced. Estimated ejection fraction is 20 % . Calculated EF via 3D Heart Model is 22 %. Moderate left ventricular hypertrophy. Severe global hypokinesis. Grade III (severe) left ventricular diastolic dysfunction. Left atrium is severely dilated. Right atrial dilatation. The aortic valve is severely calcified with restricted mobility. The aortic valve appears possibly bicuspid but difficult to determine. Severe (possibly critical) aortic stenosis is noted. Velocities may be under estimated due to reduced systolic function. Mean gradient of 43 mmHg is noted. Dimensionless index 0.08, CHERYL 0.33 cm2. Mild aortic insufficiency. Thickened mitral valve leaflets. Mild mitral regurgitation. Mild tricuspid regurgitation. Estimated right ventricular systolic pressure is 36 mmHg. Trivial pulmonic insufficiency. Signature ----------------------------------------------------------------------------  Electronically signed by Shanell Buckley(Sonographer) on 07/12/2021  05:27 PM ---------------------------------------------------------------------------- ----------------------------------------------------------------------------  Electronically signed by Karl KayInterpreting physician) on 07/13/2021  12:10 PM ---------------------------------------------------------------------------- FINDINGS Left Atrium Left atrium is severely dilated. Left Ventricle Left ventricle is enlarged.  Global left ventricular systolic function is severely reduced. Estimated ejection fraction is 20 % . Calculated EF via 3D Heart Model is 22 %. Moderate left ventricular hypertrophy. Severe global hypokinesis. Grade III (severe) left ventricular diastolic dysfunction. Right Atrium Right atrial dilatation. Right Ventricle Normal right ventricular size and function. Mitral Valve Thickened mitral valve leaflets. Mild mitral regurgitation. No mitral stenosis. Aortic Valve The aortic valve is severely calcified with restricted mobility. The aortic valve appears possibly bicuspid but difficult to determine. Severe (possibly critical) aortic stenosis is noted. Velocities may be under estimated due to reduced systolic function. Mean gradient of 43 mmHg is noted. Dimensionless index 0.08, CHERYL 0.33 cm2. Mild aortic insufficiency. Tricuspid Valve Normal tricuspid valve leaflets. Mild tricuspid regurgitation. No tricuspid stenosis. Estimated right ventricular systolic pressure is 36 mmHg. Pulmonic Valve Pulmonic valve is normal in structure and function. Trivial pulmonic insufficiency. No evidence of pulmonic stenosis. Pericardial Effusion No pericardial effusion. Miscellaneous Normal aortic root dimension. The ascending aorta is normal in size. E/E' average = 16.3. IVC Increased diameter and impaired or no inspiratory variation indicating elevated RA filling pressure (i.e. CVP) .  M-mode / 2D Measurements & Calculations:   LVIDd:6.3 cm(3.7 - 5.6 cm)        Diastolic CILDDF:797 ml  NQMMI:2.1 cm(2.2 - 4.0 cm)        Systolic WDAYFD:718 ml  DADU:2.3 cm(0.6 - 1.1 cm)         Aortic Root:3.2 cm(2.0 - 3.7 cm)  LVPWd:1.5 cm(0.6 - 1.1 cm)        LA Dimension: 6 cm(1.9 - 4.0 cm)  Fractional Shortenin.7 %      LA volume/Index: 125 ml /60m^2  Calculated LVEF (%): 21.82 %      LVOT:2.4 cm                                    RVDd:3.9 cm   Mitral:                                 Aortic   Valve Area (P1/2-Time): 3.55 cm^2       Peak Velocity: 2.07 m/s  Peak E-Wave: 0.78 m/s                   Mean Velocity: 1.40 m/s  Peak A-Wave: 0.20 m/s                   Peak Gradient: 17.14 mmHg  E/A Ratio: 3.98                         Mean Gradient: 33 mmHg  Peak Gradient: 2.46 mmHg  Mean Gradient: 1 mmHg  Deceleration Time: 183 msec             Area (continuity): 0.46 cm^2  P1/2t: 62 msec                          AV VTI: 98.6 cm   Area (continuity): 1.41 cm^2  Mean Velocity: 0.51 m/s   Tricuspid:                              Pulmonic:   Estimated RVSP: 36 mmHg                 Peak Velocity: 0.77 m/s  Peak TR Velocity: 2.31 m/s              Peak Gradient: 2.4 mmHg  Peak TR Gradient: 21.3444 mmHg  Estimated RA Pressure: 15 mmHg                                           Estimated PASP: 36.34 mmHg  Diastology / Tissue Doppler Septal Wall E' velocity:0.04 m/s Septal Wall E/E':17.6 Lateral Wall E' velocity:0.05 m/s Lateral Wall E/E':15    XR CHEST (2 VW)    Result Date: 7/12/2021  EXAMINATION: TWO XRAY VIEWS OF THE CHEST 7/12/2021 9:59 am COMPARISON: None. HISTORY: ORDERING SYSTEM PROVIDED HISTORY: shortness of breath TECHNOLOGIST PROVIDED HISTORY: shortness of breath FINDINGS: Frontal and lateral views of the chest are electronically marked regarding sides. Marked cardiomegaly. Pulmonary vascularity is within normal limits. No infiltrates, effusions or pneumothoraces. Mild-moderate decreased anterior vertebral body height at the thoracolumbar junction, chronicity unknown. There are some marginal osteophytes seen within the spine. Prominent cardiomegaly without evidence of CHF or pulmonary edema. Anterior wedged lower thoracic vertebra, chronicity unknown. MRI THORACIC SPINE WO CONTRAST    Result Date: 7/13/2021  EXAMINATION: MRI OF THE THORACIC SPINE WITHOUT CONTRAST  7/13/2021 1:25 pm TECHNIQUE: Multiplanar multisequence MRI of the thoracic spine was performed without the administration of intravenous contrast. COMPARISON: None.  HISTORY: ORDERING SYSTEM PROVIDED HISTORY: Rule out fracture TECHNOLOGIST PROVIDED HISTORY: Rule out fracture Reason for Exam: rule out fracture FINDINGS: BONES/ALIGNMENT: Thoracic spine alignment is normal.  Thoracic vertebral bodies are normal in height. No acute thoracic spine fracture. The marrow signal pattern throughout the thoracic spine is normal. SPINAL CORD: No abnormal cord signal is seen. SOFT TISSUES: No paraspinal mass identified. DEGENERATIVE CHANGES: Right paracentral disc bulge and osteophyte at T6-T7 measures 5.5 mm. This encroaches the right ventrolateral surface of the thoracic spinal cord without diony thoracic spinal cord compression. No significant thoracic spinal canal stenosis. No significant thoracic neural foraminal stenosis. 1. No acute thoracic spine abnormality. Specifically, no acute thoracic spine fracture. 2. Right paracentral disc bulge and osteophyte at T6-T7 encroaches on the right ventrolateral thoracic spinal cord without thoracic spinal cord compression. VL DUP LOWER EXTREMITY VENOUS BILATERAL    Result Date: 7/13/2021    [facility]  Vascular Lower Extremities DVT Study Procedure   Patient Name  [facility]    Date of Study         07/13/2021                [facility]   Date of Birth 1960  Gender                Male   Age           63 year(s)  Race                  Unknown   Room Number   0328        Height:               70 inch, 177.8 cm   Corporate ID  [id number]    Weight:               196 pounds, 88.9 kg  #   Patient Acct  [de-identified]   BSA:       2.07 m^2   BMI:         28.12 kg/m^2  #   MR #          [id number]     Sonographer           Iron Perez RVT, [facility]   Accession #   [id number]  Interpreting          Ty Lever                            Physician   Referring                 Referring Physician   Pieter Naranjo MD  Nurse  Practitioner  Procedure Type of Study:   Veins: Lower Extremities DVT Study, Venous Scan Lower Bilateral.  Indications for Study:Leg Swelling. Patient Status: In Measurements +------------------------------------+----------+---------------+----------+ ! Location                            ! Visualized! Compressibility! Thrombosis! +------------------------------------+----------+---------------+----------+ ! Common Femoral                      !Yes       ! Yes            ! None      ! +------------------------------------+----------+---------------+----------+ ! Prox Femoral                        !Yes       ! Yes            ! None      ! +------------------------------------+----------+---------------+----------+ ! Mid Femoral                         !Yes       ! Yes            ! None      ! +------------------------------------+----------+---------------+----------+ ! Dist Femoral                        !Yes       ! Yes            ! None      ! +------------------------------------+----------+---------------+----------+ ! Deep Femoral                        !Yes       ! Yes            ! None      ! +------------------------------------+----------+---------------+----------+ ! Popliteal                           !Yes       ! Yes            ! None      ! +------------------------------------+----------+---------------+----------+ ! Sapheno Femoral Junction            ! Yes       ! Yes            ! None      ! +------------------------------------+----------+---------------+----------+ ! PTV                                 ! Yes       ! Yes            ! None      ! +------------------------------------+----------+---------------+----------+ ! Peroneal                            !Yes       ! Yes            ! None      ! +------------------------------------+----------+---------------+----------+ ! Gastroc                             ! Yes       ! Yes            ! None      ! +------------------------------------+----------+---------------+----------+ ! GSV Thigh                           ! Yes       ! Yes            ! None      ! +------------------------------------+----------+---------------+----------+ ! GSV Knee !Yes       !Yes            ! None      ! +------------------------------------+----------+---------------+----------+ ! GSV Ankle                           ! Yes       ! Yes            ! None      ! +------------------------------------+----------+---------------+----------+ ! SSV                                 ! Yes       ! Yes            ! None      ! +------------------------------------+----------+---------------+----------+ Right Doppler Measurements +--------------------------+---------+------+------------------------------+ ! Location                  ! Signal   !Reflux! Reflux (msec)                 ! +--------------------------+---------+------+------------------------------+ ! Common Femoral            !Pulsatile!      !                              ! +--------------------------+---------+------+------------------------------+ ! Prox Femoral              !Pulsatile!      !                              ! +--------------------------+---------+------+------------------------------+ ! Popliteal                 !Pulsatile!      !                              ! +--------------------------+---------+------+------------------------------+ Left Lower Extremities DVT Study Measurements Left 2D Measurements +------------------------------------+----------+---------------+----------+ ! Location                            ! Visualized! Compressibility! Thrombosis! +------------------------------------+----------+---------------+----------+ ! Common Femoral                      !Yes       ! Yes            ! None      ! +------------------------------------+----------+---------------+----------+ ! Prox Femoral                        !Yes       ! Yes            ! None      ! +------------------------------------+----------+---------------+----------+ ! Mid Femoral                         !Yes       ! Yes            ! None      ! +------------------------------------+----------+---------------+----------+ ! Dist Femoral !Yes       !Yes            ! None      ! +------------------------------------+----------+---------------+----------+ ! Popliteal                           !Yes       ! Yes            ! None      ! +------------------------------------+----------+---------------+----------+ ! Sapheno Femoral Junction            ! Yes       ! Yes            ! None      ! +------------------------------------+----------+---------------+----------+ ! PTV                                 ! Yes       ! Yes            ! None      ! +------------------------------------+----------+---------------+----------+ ! Peroneal                            !Yes       ! Yes            ! None      ! +------------------------------------+----------+---------------+----------+ ! Gastroc                             ! Yes       ! Yes            ! None      ! +------------------------------------+----------+---------------+----------+ ! GSV Thigh                           ! Yes       ! Yes            ! None      ! +------------------------------------+----------+---------------+----------+ ! GSV Knee                            ! Yes       ! Yes            ! None      ! +------------------------------------+----------+---------------+----------+ ! GSV Ankle                           ! Yes       ! Yes            ! None      ! +------------------------------------+----------+---------------+----------+ ! SSV                                 ! Yes       ! Yes            ! None      ! +------------------------------------+----------+---------------+----------+ Left Doppler Measurements +--------------------------+---------+------+------------------------------+ ! Location                  ! Signal   !Reflux! Reflux (msec)                 ! +--------------------------+---------+------+------------------------------+ ! Common Femoral            !Pulsatile!      !                              ! +--------------------------+---------+------+------------------------------+ ! Prox Femoral !Pulsatile!      !                              ! +--------------------------+---------+------+------------------------------+ ! Popliteal                 !Pulsatile!      !                              ! +--------------------------+---------+------+------------------------------+        IMPRESSION:   Primary Problem  Acute combined systolic and diastolic congestive heart failure [facility])    Active Hospital Problems    Diagnosis Date Noted    Polycythemia [D75.1] 07/15/2021    Acute combined systolic and diastolic congestive heart failure (HCC) [I50.41]     Congestive heart failure of unknown etiology ([facility] [I50.9] 07/12/2021    Acute left-sided CHF (congestive heart failure) ([facility] [I50.1] 07/12/2021    Smoking [F17.200] 07/12/2021    Aortic stenosis [I35.0] 07/12/2021    Alcohol abuse [F10.10] 07/12/2021    Hypothyroid [E03.9] 04/20/2015    Low back pain [M54.5] 08/02/2011           RECOMMENDATIONS:  1. I personally reviewed results of lab work-up imaging studies and other relevant clinical data. 2. Reviewed hospitalization record  3. madonna 2 and erythropoeitin level ordered  4. Discussed options diagnosis of polycythemia. Patient has not had polycythemia prior to admission. However hemoglobin and hematocrit have risen significantly during the hospitalization. Clinically I suspect patient polycythemia secondary in nature can be explained by CHF tobacco dependence and partly hemoconcentration from diuresis   UA neg  for hematuria  Discussed with patient that hyperviscosity syndrome can also occur with reactive polycythemia especially once hematocrit starts rising above 60 treatment would be phlebotomy however given patient is relatively asymptomatic and does not have typical signs or symptoms of hyperviscosity syndrome and also risk of decompensation of CHF especially with ejection fraction of 20% I will hold off on phlebotomy.   Okay to proceed with cardiac catheterization  Continue antiplatelet therapy and pharmacological DVT prophylaxis  Patient will need outpatient follow-up to complete work-up for polycythemia and further management if indicated      Discussed with patient and Nurse. Thank you for asking us to see this patient. Mario Le MD          This note is created with the assistance of a speech recognition program.  While intending to generate a document that actually reflects the content of the visit, the document can still have some errors including those of syntax and sound a like substitutions which may escape proof reading. It such instances, actual meaning can be extrapolated by contextual diversion.

## 2021-07-16 NOTE — PROGRESS NOTES
[facility]   Progress Note                   Date:   7/16/2021  Patient name: Jayce Alvarez  Date of admission:  7/12/2021  9:19 AM  MRN:   [id number]  YOB: 1960  PCP: Zhane Oden MD    Reason for Admission: Congestive heart failure of unknown etiology ([facility] [address] [I50.9]  [facility]ute [facility]sided [facility] [I50.1]    Subjective:       Clinical Changes / Abnormalities:   Patient seen and examined with multiple family members present at bedside. Denies chest pain or SOB. No respiratory distress on RA. Plan for second attempt at [facility] yesterday cancelled d/t patient having vasovagal episode. SR on tele HR 74        Medications:   Scheduled Meds:   aspirin  81 mg Oral Daily    atorvastatin  20 mg Oral Nightly    [Held by provider] furosemide  40 mg Intravenous BID    levothyroxine  75 mcg Oral Daily    sodium chloride flush  5-40 mL Intravenous 2 times per day    enoxaparin  40 mg Subcutaneous Daily    thiamine mononitrate  100 mg Oral Daily    folic acid  1 mg Oral Daily     Continuous Infusions:   dextrose      sodium chloride       CBC:   Recent Labs     07/15/21  0541 07/15/21  0829 07/16/21  0636   WBC 6.7 7.0 7.3   HGB 19.9* 20.0* 19.2*    232 213     BMP:    Recent Labs     07/14/21  0501 07/15/21  0541 07/16/21  0636   * 138 135   K 4.7 4.3 4.3   CL 98 95* 97*   CO2 25 28 23   BUN 20 23 25*   CREATININE 1.02 0.90 1.21*   GLUCOSE 88 104* 118*     Hepatic:   No results for input(s): AST, ALT, ALB, BILITOT, ALKPHOS in the last 72 hours. Troponin:   No results for input(s): TROPHS in the last 72 hours. BNP: No results for input(s): BNP in the last 72 hours. Lipids:   No results for input(s): CHOL, HDL in the last 72 hours. Invalid input(s): LDLCALCU  INR: No results for input(s): INR in the last 72 hours.     Objective:   Vitals: BP 99/73   Pulse 67   Temp 98 °F (36.7 °C) (Oral)   Resp 14   Ht 5' 10\" (1.778 m)   Wt 163 lb (73.9 kg)   SpO2 97%   BMI 23.39 kg/m²   General appearance: alert and cooperative with exam  HEENT: Head: Normocephalic, no lesions, without obvious abnormality. Neck: no JVD, trachea midline, no adenopathy  Lungs: Clear to auscultation throughout. Room air without distress. Heart: Regular rate and rhythm, s1/s2 auscultated, no murmurs. SR on tele. Abdomen: soft, non-tender, bowel sounds active  Extremities: No edema noted. Neurologic: not done    EKG: Normal sinus rhythm  Left atrial enlargement  Left axis deviation  Left ventricular hypertrophy with QRS widening and repolarization abnormality  Cannot rule out Anteroseptal infarct , age undetermined  Abnormal ECG  No previous ECGs available    Echo 7/12/2021  Summary  Left ventricle is enlarged. Global left ventricular systolic function is  severely reduced. Estimated ejection fraction is 20 % . Calculated EF via 3D Heart Model is 22 %. Moderate left ventricular hypertrophy. Severe global hypokinesis. Grade III (severe) left ventricular diastolic dysfunction. Left atrium is severely dilated. Right atrial dilatation. The aortic valve is severely calcified with restricted mobility. The aortic  valve appears possibly bicuspid but difficult to determine. Severe (possibly  critical) aortic stenosis is noted. Velocities may be under estimated due to  reduced systolic function. Mean gradient of 43 mmHg is noted. Dimensionless index 0.08, CHERYL 0.33 cm2. Mild aortic insufficiency. Thickened mitral valve leaflets. Mild mitral regurgitation. Mild tricuspid regurgitation. Estimated right ventricular systolic pressure  is 36 mmHg. Trivial pulmonic insufficiency. Assessment / Acute Cardiac Problems:   1. Acute exacerbation of congestive heart failure   2. Aortic steonsis  3. dyspnea  4. Bilateral pedal edema  5. Daily smoker  6. Hypothyroidism- TSH 4.0  7.  Chronic back pain      Patient Active Problem List:     Sciatica     Low back pain     Gout     Other ankle sprain and strain     Diabetic macular edema (HCC)     Other testicular hypofunction     Generalized osteoarthrosis, unspecified site     Enthesopathy     Hypothyroid     Congestive heart failure of unknown etiology ([address]     Acute left-sided CHF (congestive heart failure) (HCC)     Smoking     Aortic stenosis     Alcohol abuse     Congestive heart failure ([facility] [address]      Plan of Treatment:   1. HFrEF. Echo 7/12/2021 significantly reduced LVEF of 20-22%. Recommend per Dr Godwin Headings for [address] and [address] with aortic valve studies to further evaluate. Cath was attempted for second time but patient had vasovagal episode. Will plan for RHC and LHC again today. Patient NPO and agrees to proceed with cath. Cath Lab notified. 2. Appreciate Hemo/Onc input. Polycythemia felt to be secondary in nature in relation to CHF, tobacco dependence and partly hemoconcentration from diuresis. Okay to proceed with cardiac cath per note yesterday. 3. Severe Aortic stenosis on ECHO 7/12/2021. Cath to evaluated yesterday cancelled as mentioned above. Will re-attempt cardiac cath today. 4. HTN. Stable. 5. Keep K > 4.0 and Mag > 2.0   6.  Rest of care managed per Primary Team.         Electronically signed by WILLIAM Stone CNP on 7/16/2021 at 11:32 AM  [facility]      [phone]

## 2021-07-16 NOTE — PROGRESS NOTES
[facility]  Internal Medicine Teaching Residency Program  Inpatient Daily Progress Note  ______________________________________________________________________________    Patient: Ruba Oscar  YOB: 1960   XBD:[id number]    Acct: [de-identified]     Room: 2022/2022-01  Admit date: 7/12/2021  Today's date: 07/16/21  Number of days in the hospital: 4    SUBJECTIVE   Admitting Diagnosis: Acute combined systolic and diastolic congestive heart failure ([address]  CC: SOB  Pt examined at bedside. Chart & results reviewed. Patient is eating ok, sleeping ok, normal bowel/ bladder movements. No fevers, chills, rigors, chest pain,shortness of breath, cough, abdominal pain, nausea, vomiting, diarrhea, dysuria or increased frequency of micturation. ROS:  Constitutional:  negative for chills, fevers, sweats  Respiratory:  SOB  Cardiovascular:  CP on exertion  Gastrointestinal:  negative for abdominal pain, constipation, diarrhea, nausea, vomiting  Neurological:  negative for dizziness, headache    BRIEF HISTORY     The patient is a pleasant 63 y.o. male past medical history of recent hip injury, osteoarthritis, hypothyroidism presents with a chief complaint of  shortness of breath and lower leg swelling for the past 10 days. Patient first noticed his physical therapy sessions when his therapist pointed out lower extremity swelling. His legs have looked increasingly more swollen. Patient reports chest pain whenever he exerts himself and shortness of breath is noted whenever he lays down flat. Echo performed in the ED showed 20% ejection fraction with extensive aortic stenosis. Catheterization was attempted but patient was unable to be laid down. Second attempt was made but patient had a vasovagal episode. Patient was transferred to stepdown. Patient seen at bedside hemodynamically stable. No fevers chills chest pain shortness of breath overnight.     OBJECTIVE Vital Signs:  BP 99/73   Pulse 67   Temp 98 °F (36.7 °C) (Oral)   Resp 14   Ht 5' 10\" (1.778 m)   Wt 163 lb (73.9 kg)   SpO2 97%   BMI 23.39 kg/m²     Temp (24hrs), Av.7 °F (36.5 °C), Min:97.6 °F (36.4 °C), Max:98 °F (36.7 °C)    In: 520   Out: 1660 [Urine:1660]    Physical Exam:  Physical Exam  Constitutional:       Appearance: Normal appearance. HENT:      Head: Normocephalic and atraumatic. Nose: Nose normal.      Mouth/Throat:      Mouth: Mucous membranes are moist.   Eyes:      Extraocular Movements: Extraocular movements intact. Pupils: Pupils are equal, round, and reactive to light. Cardiovascular:      Rate and Rhythm: Normal rate and regular rhythm. Pulses: Normal pulses. Heart sounds: Murmur (holosystolic radiating to carotids b/l) heard. Pulmonary:      Effort: Pulmonary effort is normal. No respiratory distress. Breath sounds: Normal breath sounds. No wheezing or rales. Abdominal:      General: Bowel sounds are normal. There is no distension. Palpations: Abdomen is soft. Tenderness: There is no abdominal tenderness. Musculoskeletal:         General: Normal range of motion. Cervical back: Normal range of motion and neck supple. Right lower leg: No edema. Left lower leg: No edema. Comments: Very minimal LE edema       Skin:     General: Skin is warm and dry. Neurological:      General: No focal deficit present. Mental Status: He is alert and oriented to person, place, and time. Mental status is at baseline.    Psychiatric:         Mood and Affect: Mood normal.         Behavior: Behavior normal.           Medications:  Scheduled Medications:    aspirin  81 mg Oral Daily    atorvastatin  20 mg Oral Nightly    furosemide  40 mg Intravenous BID    levothyroxine  75 mcg Oral Daily    sodium chloride flush  5-40 mL Intravenous 2 times per day    enoxaparin  40 mg Subcutaneous Daily    thiamine mononitrate  100 mg Oral Daily    folic acid  1 mg Oral Daily     Continuous Infusions:    dextrose      sodium chloride       PRN Medicationsglucose, 15 g, PRN  dextrose, 12.5 g, PRN  glucagon (rDNA), 1 mg, PRN  dextrose, 100 mL/hr, PRN  sodium chloride flush, 5-40 mL, PRN  sodium chloride, 25 mL, PRN  ondansetron, 4 mg, Q8H PRN   Or  ondansetron, 4 mg, Q6H PRN  polyethylene glycol, 17 g, Daily PRN  acetaminophen, 650 mg, Q6H PRN   Or  acetaminophen, 650 mg, Q6H PRN  HYDROcodone-acetaminophen, 1 tablet, Q6H PRN        Diagnostic Labs:  CBC:   Recent Labs     07/15/21  0541 07/15/21  0829 07/16/21  0636   WBC 6.7 7.0 7.3   RBC 6.23* 6.19* 6.03*   HGB 19.9* 20.0* 19.2*   HCT 61.1* 61.3* 59.3*   MCV 98.1 99.0 98.3   RDW 16.1* 16.7* 15.5*    232 213     BMP:   Recent Labs     07/14/21  0501 07/15/21  0541 07/16/21  0636   * 138 135   K 4.7 4.3 4.3   CL 98 95* 97*   CO2 25 28 23   BUN 20 23 25*   CREATININE 1.02 0.90 1.21*     FASTING LIPID PANEL:  Lab Results   Component Value Date    CHOL 131 07/13/2021    HDL 36 (L) 07/13/2021    TRIG 74 07/13/2021     LIVER PROFILE:   No results for input(s): AST, ALT, ALB, BILIDIR, BILITOT, ALKPHOS in the last 72 hours. MICROBIOLOGY: No results found for: CULTURE    Imaging:    XR CHEST (2 VW)    Result Date: 7/12/2021  Prominent cardiomegaly without evidence of CHF or pulmonary edema. Anterior wedged lower thoracic vertebra, chronicity unknown. ASSESSMENT & PLAN     Assessment and Plan:    Principal Problem:    Acute combined systolic and diastolic congestive heart failure (HCC)  Active Problems:    Low back pain    Hypothyroid    Congestive heart failure of unknown etiology ([address]    Acute left-sided CHF (congestive heart failure) (HCC)    Smoking    Aortic stenosis    Alcohol abuse    Polycythemia  Resolved Problems:    * No resolved hospital problems.  *    CHF exacerbation  Aortic stenosis  -Echo showing EF of 20% with significant aortic valve calcification  -Unable to tolerate catheterization due to inability to lay down flat. Second attempt made but was unable to complete due to vasovagal episode  -We will discussed with cardiology about further plans of catheterization  -Currently hold diuretics due to low blood pressures overnight  - avoid calcium channel blockers    Polycythemia  -hgb elevated 20  -suspect due to smoking hx and hypoxia  -Heme-onc consulted. Will f/u  Labs    - ok for cath per heme-onc     LE swelling due to CHF exacerbation  -Hold Lasix due to low blood pressures overnight  -2 L urine output overnight  -swelling has improved since admission  -Lower extremity duplex negative for DVTs. Lower back pain  - CT showed possible compression fx  - MRI ruled out any fracture. Disc buldge in t6 t7 area    Hypothyroidism  -Continue levothyroxine     Osteoarthritis  -Continue Norco per home dose     Smoking hx  - intermittently smokes 1-2 ppd  - recommend cessation     Alcohol use hx  - no concerns for withdrawal  - continue thiamine folic acid and MVT.       Clemmie Fleischer  Internal Medicine Resident  [address]   7/16/2021 8:32 AM

## 2021-07-17 VITALS
HEIGHT: 70 IN | SYSTOLIC BLOOD PRESSURE: 104 MMHG | DIASTOLIC BLOOD PRESSURE: 76 MMHG | BODY MASS INDEX: 23.42 KG/M2 | TEMPERATURE: 98 F | OXYGEN SATURATION: 96 % | RESPIRATION RATE: 16 BRPM | WEIGHT: 163.58 LBS | HEART RATE: 72 BPM

## 2021-07-17 PROBLEM — I50.1 ACUTE LEFT-SIDED CHF (CONGESTIVE HEART FAILURE) (HCC): Status: RESOLVED | Noted: 2021-07-12 | Resolved: 2021-07-17

## 2021-07-17 LAB
ABSOLUTE EOS #: 0.08 K/UL (ref 0–0.44)
ABSOLUTE IMMATURE GRANULOCYTE: <0.03 K/UL (ref 0–0.3)
ABSOLUTE LYMPH #: 1.21 K/UL (ref 1.1–3.7)
ABSOLUTE MONO #: 0.62 K/UL (ref 0.1–1.2)
ANION GAP SERPL CALCULATED.3IONS-SCNC: 11 MMOL/L (ref 9–17)
BASOPHILS # BLD: 1 % (ref 0–2)
BASOPHILS ABSOLUTE: 0.05 K/UL (ref 0–0.2)
BUN BLDV-MCNC: 26 MG/DL (ref 8–23)
BUN/CREAT BLD: ABNORMAL (ref 9–20)
CALCIUM SERPL-MCNC: 8.7 MG/DL (ref 8.6–10.4)
CHLORIDE BLD-SCNC: 101 MMOL/L (ref 98–107)
CO2: 26 MMOL/L (ref 20–31)
CREAT SERPL-MCNC: 0.91 MG/DL (ref 0.7–1.2)
DIFFERENTIAL TYPE: ABNORMAL
EOSINOPHILS RELATIVE PERCENT: 1 % (ref 1–4)
ERYTHROPOIETIN: 5 MU/ML (ref 4–27)
GFR AFRICAN AMERICAN: >60 ML/MIN
GFR NON-AFRICAN AMERICAN: >60 ML/MIN
GFR SERPL CREATININE-BSD FRML MDRD: ABNORMAL ML/MIN/{1.73_M2}
GFR SERPL CREATININE-BSD FRML MDRD: ABNORMAL ML/MIN/{1.73_M2}
GLUCOSE BLD-MCNC: 109 MG/DL (ref 75–110)
GLUCOSE BLD-MCNC: 83 MG/DL (ref 70–99)
GLUCOSE BLD-MCNC: 87 MG/DL (ref 75–110)
HCT VFR BLD CALC: 54.8 % (ref 40.7–50.3)
HEMOGLOBIN: 17.4 G/DL (ref 13–17)
IMMATURE GRANULOCYTES: 0 %
LYMPHOCYTES # BLD: 20 % (ref 24–43)
MCH RBC QN AUTO: 31.6 PG (ref 25.2–33.5)
MCHC RBC AUTO-ENTMCNC: 31.8 G/DL (ref 28.4–34.8)
MCV RBC AUTO: 99.5 FL (ref 82.6–102.9)
MONOCYTES # BLD: 10 % (ref 3–12)
NRBC AUTOMATED: 0 PER 100 WBC
PDW BLD-RTO: 15.3 % (ref 11.8–14.4)
PLATELET # BLD: 180 K/UL (ref 138–453)
PLATELET ESTIMATE: ABNORMAL
PMV BLD AUTO: 11.1 FL (ref 8.1–13.5)
POTASSIUM SERPL-SCNC: 4.5 MMOL/L (ref 3.7–5.3)
RBC # BLD: 5.51 M/UL (ref 4.21–5.77)
RBC # BLD: ABNORMAL 10*6/UL
SEG NEUTROPHILS: 68 % (ref 36–65)
SEGMENTED NEUTROPHILS ABSOLUTE COUNT: 4 K/UL (ref 1.5–8.1)
SODIUM BLD-SCNC: 138 MMOL/L (ref 135–144)
WBC # BLD: 6 K/UL (ref 3.5–11.3)
WBC # BLD: ABNORMAL 10*3/UL

## 2021-07-17 PROCEDURE — 80048 BASIC METABOLIC PNL TOTAL CA: CPT

## 2021-07-17 PROCEDURE — 6370000000 HC RX 637 (ALT 250 FOR IP): Performed by: STUDENT IN AN ORGANIZED HEALTH CARE EDUCATION/TRAINING PROGRAM

## 2021-07-17 PROCEDURE — 85025 COMPLETE CBC W/AUTO DIFF WBC: CPT

## 2021-07-17 PROCEDURE — 2580000003 HC RX 258: Performed by: STUDENT IN AN ORGANIZED HEALTH CARE EDUCATION/TRAINING PROGRAM

## 2021-07-17 PROCEDURE — 82947 ASSAY GLUCOSE BLOOD QUANT: CPT

## 2021-07-17 PROCEDURE — 36415 COLL VENOUS BLD VENIPUNCTURE: CPT

## 2021-07-17 PROCEDURE — 99239 HOSP IP/OBS DSCHRG MGMT >30: CPT | Performed by: INTERNAL MEDICINE

## 2021-07-17 PROCEDURE — 6370000000 HC RX 637 (ALT 250 FOR IP): Performed by: INTERNAL MEDICINE

## 2021-07-17 RX ORDER — ASPIRIN 81 MG/1
81 TABLET, CHEWABLE ORAL DAILY
Qty: 30 TABLET | Refills: 3 | Status: SHIPPED | OUTPATIENT
Start: 2021-07-18

## 2021-07-17 RX ORDER — THIAMINE MONONITRATE (VIT B1) 100 MG
100 TABLET ORAL DAILY
Qty: 30 TABLET | Refills: 3 | Status: SHIPPED | OUTPATIENT
Start: 2021-07-18

## 2021-07-17 RX ORDER — FUROSEMIDE 20 MG/1
20 TABLET ORAL 2 TIMES DAILY
Qty: 60 TABLET | Refills: 3 | Status: SHIPPED | OUTPATIENT
Start: 2021-07-17

## 2021-07-17 RX ORDER — FUROSEMIDE 20 MG/1
20 TABLET ORAL 2 TIMES DAILY
Status: DISCONTINUED | OUTPATIENT
Start: 2021-07-17 | End: 2021-07-17 | Stop reason: HOSPADM

## 2021-07-17 RX ORDER — FOLIC ACID 1 MG/1
1 TABLET ORAL DAILY
Qty: 30 TABLET | Refills: 3 | Status: SHIPPED | OUTPATIENT
Start: 2021-07-18 | End: 2021-11-23

## 2021-07-17 RX ORDER — ATORVASTATIN CALCIUM 20 MG/1
20 TABLET, FILM COATED ORAL NIGHTLY
Qty: 30 TABLET | Refills: 3 | Status: SHIPPED | OUTPATIENT
Start: 2021-07-17 | End: 2022-03-15 | Stop reason: SDUPTHER

## 2021-07-17 RX ORDER — CARVEDILOL 3.12 MG/1
3.12 TABLET ORAL 2 TIMES DAILY WITH MEALS
Qty: 60 TABLET | Refills: 3 | Status: SHIPPED | OUTPATIENT
Start: 2021-07-17 | End: 2022-03-15 | Stop reason: SDUPTHER

## 2021-07-17 RX ORDER — CARVEDILOL 3.12 MG/1
3.12 TABLET ORAL 2 TIMES DAILY WITH MEALS
Status: DISCONTINUED | OUTPATIENT
Start: 2021-07-17 | End: 2021-07-17 | Stop reason: HOSPADM

## 2021-07-17 RX ADMIN — LEVOTHYROXINE SODIUM 75 MCG: 75 TABLET ORAL at 08:17

## 2021-07-17 RX ADMIN — Medication 100 MG: at 08:17

## 2021-07-17 RX ADMIN — FOLIC ACID 1 MG: 1 TABLET ORAL at 08:17

## 2021-07-17 RX ADMIN — ASPIRIN 81 MG: 81 TABLET, CHEWABLE ORAL at 08:17

## 2021-07-17 RX ADMIN — Medication 10 ML: at 08:17

## 2021-07-17 RX ADMIN — HYDROCODONE BITARTRATE AND ACETAMINOPHEN 1 TABLET: 5; 325 TABLET ORAL at 08:15

## 2021-07-17 ASSESSMENT — PAIN SCALES - GENERAL: PAINLEVEL_OUTOF10: 8

## 2021-07-17 NOTE — PROGRESS NOTES
[facility]   Progress Note                   Date:   7/17/2021  Patient name: Dia Blue  Date of admission:  7/12/2021  9:19 AM  MRN:   [id number]  YOB: 1960  PCP: Sky Walker MD    Reason for Admission: Congestive heart failure of unknown etiology ([facility] [I50.9]  Acute left-sided CHF (congestive heart failure) ([facility] [I50.1]    Subjective:       Clinical Changes / Abnormalities:   Patient seen and examined in the room. Pt denies any CP and states that sob is stable. He is hoping to go home today. Medications:   Scheduled Meds:   sodium chloride flush  5-40 mL Intravenous 2 times per day    aspirin  81 mg Oral Daily    atorvastatin  20 mg Oral Nightly    [Held by provider] furosemide  40 mg Intravenous BID    levothyroxine  75 mcg Oral Daily    sodium chloride flush  5-40 mL Intravenous 2 times per day    enoxaparin  40 mg Subcutaneous Daily    thiamine mononitrate  100 mg Oral Daily    folic acid  1 mg Oral Daily     Continuous Infusions:   sodium chloride 50 mL/hr at 07/16/21 1230    sodium chloride      dextrose      sodium chloride       CBC:   Recent Labs     07/15/21  0829 07/16/21  0636 07/17/21  0721   WBC 7.0 7.3 6.0   HGB 20.0* 19.2* 17.4*    213 180     BMP:    Recent Labs     07/15/21  0541 07/16/21  0636 07/17/21  0721    135 138   K 4.3 4.3 4.5   CL 95* 97* 101   CO2 28 23 26   BUN 23 25* 26*   CREATININE 0.90 1.21* 0.91   GLUCOSE 104* 118* 83     Hepatic:   No results for input(s): AST, ALT, ALB, BILITOT, ALKPHOS in the last 72 hours. Troponin:   No results for input(s): TROPHS in the last 72 hours. BNP: No results for input(s): BNP in the last 72 hours. Lipids:   No results for input(s): CHOL, HDL in the last 72 hours. Invalid input(s): LDLCALCU  INR: No results for input(s): INR in the last 72 hours.     Objective:   Vitals: BP 89/69   Pulse 75   Temp 98.1 °F (36.7 °C) (Oral)   Resp 16   Ht 5' 10\" (1.778 m) Wt 163 lb 9.3 oz (74.2 kg)   SpO2 96%   BMI 23.47 kg/m²   General appearance: alert and cooperative with exam  HEENT: Head: Normocephalic, no lesions, without obvious abnormality. Neck: no JVD, trachea midline, no adenopathy  Lungs: Clear to auscultation throughout. Room air without distress. Heart: Regular rate and rhythm, s1/s2 auscultated, no murmurs. SR on tele. Abdomen: soft, non-tender, bowel sounds active  Extremities: No edema noted. Neurologic: not done    EKG: Normal sinus rhythm  Left atrial enlargement  Left axis deviation  Left ventricular hypertrophy with QRS widening and repolarization abnormality  Cannot rule out Anteroseptal infarct , age undetermined  Abnormal ECG  No previous ECGs available    Echo 7/12/2021  Summary  Left ventricle is enlarged. Global left ventricular systolic function is  severely reduced. Estimated ejection fraction is 20 % . Calculated EF via 3D Heart Model is 22 %. Moderate left ventricular hypertrophy. Severe global hypokinesis. Grade III (severe) left ventricular diastolic dysfunction. Left atrium is severely dilated. Right atrial dilatation. The aortic valve is severely calcified with restricted mobility. The aortic  valve appears possibly bicuspid but difficult to determine. Severe (possibly  critical) aortic stenosis is noted. Velocities may be under estimated due to  reduced systolic function. Mean gradient of 43 mmHg is noted. Dimensionless index 0.08, CHERYL 0.33 cm2. Mild aortic insufficiency. Thickened mitral valve leaflets. Mild mitral regurgitation. Mild tricuspid regurgitation. Estimated right ventricular systolic pressure  is 36 mmHg. Trivial pulmonic insufficiency. CARDIAC CATH 7/16/21  Procedure Summary        1. Mild non-obstructive coronary artery disease    2. Severely impaired LV systolic function.  Estimated EF 25%.    3. Severe/Critical aortic stenosis with mean gradient of 79 mm hg and CHERYL    0.36 cm2.    4. Normal right heart

## 2021-07-17 NOTE — PLAN OF CARE
Call light within reach. Bed in lowest position. Cath site assessed. Patient on telemetry. Vitals and assessment done. Patient remained free from falls throughout shift.

## 2021-07-17 NOTE — CARE COORDINATION
Transitional Planning    Plan to discharge today; med assist form faxed to pharmacy at [phone]. Discharge Report    Discharge home today; states he has support and a ride.      [facility])  Clinical Case Management Department  Written by: Christel Rojo RN    Patient Name: Kanika Barrios  Attending Provider: Rajendra Cui MD  Admit Date: 2021  9:19 AM  MRN: [id number]  Account: [de-identified]                     : 1960  Discharge Date: 21      Disposition: home    Christel Rojo RN

## 2021-07-17 NOTE — FLOWSHEET NOTE
Patient given all discharge instructions/paperwork. All questions/concerns addressed.  Patient discharged off unit with all belongings   07/17/21 1507   Discharge Event   Meds to [address] notified of pending discharge/transfer and educated on post discharge care Yes

## 2021-07-17 NOTE — PROGRESS NOTES
[facility]  Internal Medicine Teaching Residency Program  Inpatient Daily Progress Note  ______________________________________________________________________________    Patient: Lizzeth Laureano  YOB: 1960   JGF:[id number]    Acct: [de-identified]     Room: 2022/2022-01  Admit date: 7/12/2021  Today's date: 07/17/21  Number of days in the hospital: 5    SUBJECTIVE   Admitting Diagnosis: Acute combined systolic and diastolic congestive heart failure (HCC)  CC: SOB  Pt examined at bedside. Chart & results reviewed. Patient is eating ok, sleeping ok, normal bowel/ bladder movements. No fevers, chills, rigors, chest pain,shortness of breath, cough, abdominal pain, nausea, vomiting, diarrhea, dysuria or increased frequency of micturation. ROS:  Constitutional:  negative for chills, fevers, sweats  Respiratory:  SOB  Cardiovascular:  CP on exertion  Gastrointestinal:  negative for abdominal pain, constipation, diarrhea, nausea, vomiting  Neurological:  negative for dizziness, headache    BRIEF HISTORY     The patient is a pleasant 63 y.o. male past medical history of recent hip injury, osteoarthritis, hypothyroidism presents with a chief complaint of  shortness of breath and lower leg swelling for the past 10 days. Patient first noticed his physical therapy sessions when his therapist pointed out lower extremity swelling. His legs have looked increasingly more swollen. Patient reports chest pain whenever he exerts himself and shortness of breath is noted whenever he lays down flat. Echo performed in the ED showed 20% ejection fraction with extensive aortic stenosis. Catheterization was attempted but patient was unable to be laid down. Second attempt was made but patient had a vasovagal episode. Patient was transferred to stepdown. Patient seen at bedside hemodynamically stable. No fevers chills chest pain shortness of breath overnight.     OBJECTIVE Vital Signs:  BP 89/69   Pulse 75   Temp 98.1 °F (36.7 °C) (Oral)   Resp 16   Ht 5' 10\" (1.778 m)   Wt 163 lb 9.3 oz (74.2 kg)   SpO2 96%   BMI 23.47 kg/m²     Temp (24hrs), Av °F (36.7 °C), Min:97.9 °F (36.6 °C), Max:98.1 °F (36.7 °C)    In: -   Out: 600 [Urine:600]    Physical Exam:  Physical Exam  Constitutional:       Appearance: Normal appearance. HENT:      Head: Normocephalic and atraumatic. Nose: Nose normal.      Mouth/Throat:      Mouth: Mucous membranes are moist.   Eyes:      Extraocular Movements: Extraocular movements intact. Pupils: Pupils are equal, round, and reactive to light. Cardiovascular:      Rate and Rhythm: Normal rate and regular rhythm. Pulses: Normal pulses. Heart sounds: Murmur (holosystolic radiating to carotids b/l) heard. Pulmonary:      Effort: Pulmonary effort is normal. No respiratory distress. Breath sounds: Normal breath sounds. No wheezing or rales. Abdominal:      General: Bowel sounds are normal. There is no distension. Palpations: Abdomen is soft. Tenderness: There is no abdominal tenderness. Musculoskeletal:         General: Normal range of motion. Cervical back: Normal range of motion and neck supple. Right lower leg: No edema. Left lower leg: No edema. Comments: Very minimal LE edema       Skin:     General: Skin is warm and dry. Neurological:      General: No focal deficit present. Mental Status: He is alert and oriented to person, place, and time. Mental status is at baseline.    Psychiatric:         Mood and Affect: Mood normal.         Behavior: Behavior normal.           Medications:  Scheduled Medications:    sodium chloride flush  5-40 mL Intravenous 2 times per day    aspirin  81 mg Oral Daily    atorvastatin  20 mg Oral Nightly    [Held by provider] furosemide  40 mg Intravenous BID    levothyroxine  75 mcg Oral Daily    sodium chloride flush  5-40 mL Intravenous 2 times per day    enoxaparin  40 mg Subcutaneous Daily    thiamine mononitrate  100 mg Oral Daily    folic acid  1 mg Oral Daily     Continuous Infusions:    sodium chloride 50 mL/hr at 07/16/21 1230    sodium chloride      dextrose      sodium chloride       PRN Medicationssodium chloride flush, 5-40 mL, PRN  sodium chloride, 25 mL, PRN  acetaminophen, 650 mg, Q4H PRN  glucose, 15 g, PRN  dextrose, 12.5 g, PRN  glucagon (rDNA), 1 mg, PRN  dextrose, 100 mL/hr, PRN  sodium chloride flush, 5-40 mL, PRN  sodium chloride, 25 mL, PRN  ondansetron, 4 mg, Q8H PRN   Or  ondansetron, 4 mg, Q6H PRN  polyethylene glycol, 17 g, Daily PRN  acetaminophen, 650 mg, Q6H PRN  HYDROcodone-acetaminophen, 1 tablet, Q6H PRN        Diagnostic Labs:  CBC:   Recent Labs     07/15/21  0541 07/15/21  0829 07/16/21  0636   WBC 6.7 7.0 7.3   RBC 6.23* 6.19* 6.03*   HGB 19.9* 20.0* 19.2*   HCT 61.1* 61.3* 59.3*   MCV 98.1 99.0 98.3   RDW 16.1* 16.7* 15.5*    232 213     BMP:   Recent Labs     07/15/21  0541 07/16/21  0636    135   K 4.3 4.3   CL 95* 97*   CO2 28 23   BUN 23 25*   CREATININE 0.90 1.21*     FASTING LIPID PANEL:  Lab Results   Component Value Date    CHOL 131 07/13/2021    HDL 36 (L) 07/13/2021    TRIG 74 07/13/2021     LIVER PROFILE:   No results for input(s): AST, ALT, ALB, BILIDIR, BILITOT, ALKPHOS in the last 72 hours. MICROBIOLOGY: No results found for: CULTURE    Imaging:    XR CHEST (2 VW)    Result Date: 7/12/2021  Prominent cardiomegaly without evidence of CHF or pulmonary edema. Anterior wedged lower thoracic vertebra, chronicity unknown.        ASSESSMENT & PLAN     Assessment and Plan:    Principal Problem:    Acute combined systolic and diastolic congestive heart failure (HCC)  Active Problems:    Low back pain    Hypothyroid    Congestive heart failure of unknown etiology ([facility]    Acute left-sided CHF (congestive heart failure) (HCC)    Smoking    Aortic stenosis    Alcohol abuse Polycythemia  Resolved Problems:    * No resolved hospital problems. *    CHF exacerbation  Aortic stenosis  -Echo showing EF of 20% with significant aortic valve calcification  -Heart cath performed on July 16  -Currently awaiting results    Polycythemia  -hgb elevated 20  -suspect due to smoking hx and hypoxia  -Heme-onc consulted. Will f/u  Labs    - ok for cath per heme-onc     LE swelling due to CHF exacerbation  -Swelling is improved due to Lasix therapy  -We will continue to monitor    Lower back pain  - CT showed possible compression fx  - MRI ruled out any fracture.  Disc buldge in t6 t7 area    Hypothyroidism  -Continue levothyroxine     Osteoarthritis  -[place] [facility] we will continue     Smoking hx  - intermittently smokes 1-2 ppd  - recommend cessation     Alcohol use hx  -Thiamine, multivitamin, folate acid  -We will continue to monitor for signs of withdrawal      Luis Angel Bailon  Internal Medicine Resident  [facility]  7/17/2021 7:45 AM

## 2021-07-19 ENCOUNTER — TELEPHONE (OUTPATIENT)
Dept: CARDIOLOGY CLINIC | Age: 61
End: 2021-07-19

## 2021-07-19 DIAGNOSIS — I35.0 AORTIC VALVE STENOSIS, ETIOLOGY OF CARDIAC VALVE DISEASE UNSPECIFIED: Primary | ICD-10-CM

## 2021-07-19 PROBLEM — I50.42 CHRONIC COMBINED SYSTOLIC (CONGESTIVE) AND DIASTOLIC (CONGESTIVE) HEART FAILURE (HCC): Status: ACTIVE | Noted: 2021-07-19

## 2021-07-19 NOTE — DISCHARGE SUMMARY
[address]     Department of Internal Medicine - Staff Internal Medicine Teaching Service    INPATIENT DISCHARGE SUMMARY      Patient Identification:  Austin Diop is a 63 y.o. male. :  1960  MRN: [id number]     Acct: [de-identified]   PCP: Juliana Richards MD  Admit Date:  2021   Discharge date and time: 21  Attending Provider: Erica att. providers found                                     [address] Problem Lists:  Principal Problem (Resolved):    Acute combined systolic and diastolic congestive heart failure (HCC)  Active Problems:    Low back pain    Hypothyroid    Congestive heart failure of unknown etiology ([facility]    Smoking    Aortic stenosis    Alcohol abuse    Polycythemia  Resolved Problems:    Acute left-sided CHF (congestive heart failure) [facility])      HOSPITAL STAY     Brief Inpatient course:   Austin Diop is a 63 y.o. male who was admitted for the management of Acute combined systolic and diastolic congestive heart failure ([address] presented to the emergency department with SOB and LE swelling for 10 days. Patient first noticed the swelling during his physical therapy sessions 1 his therapist pointed out that his lower lower extremities had increased swelling. Patient also reports that he has been having chest pain whenever he exerts himself. Patient also has shortness of breath whenever he lays down flat. Echo showed 20% ejection fraction with extensive aortic stenosis. Cardiac catheterization was attempted twice but patient was either unable to tolerate being laid down or patient had a vasovagal episode. Patient's heart cath was eventually completed and did not show any stentable coronary stenosis. Of note during hospitalization patient had elevated hemoglobin of 20. Heme-onc was consulted and through their evaluation the cause was believed to be reactive from hypoxia.   Patient was given follow-up for TAVR as OP and (SYNTHROID) 75 MCG tablet TAKE 1 TAB BY MOUTH ONCE A DAY, Disp-90 tablet, R-0Normal             Activity: activity as tolerated    Diet: cardiac diet    Follow-up:    Naaman Duane, MD  [address] [place] [address] Drive [address] [phone]    In 4 weeks  High red cell count    [facility]  [address] [facility] [phone]  Call in 2 weeks  Call for 2 week appt     Nithin Adkins MD  [address] [facility]     Schedule an appointment as soon as possible for a visit in 1 week  Hospital follow-up, please check the labs      Patient Instructions:   Please take your medications as instructed  Follow-up as recommended  If feeling dizzy, lightheaded, worsening leg swelling, or shortness of breath please report to the ER. Please get BMP in 3 days and fax results to Nithin Adkins MD  Please follow-up with hematology discussing labs.     Note that over 30 minutes was spent in preparing discharge papers, discussing discharge with patient, medication review, etc.      Queen Prudence MD  Internal Medicine Resident  [facility]  7/19/2021 12:08 PM

## 2021-07-19 NOTE — TELEPHONE ENCOUNTER
Writer called pt and updated him a referral to [place] [id number] [facility] was initiated after his Cardiac Catheterization. After a detailed explanation, Patient verbalizes understanding. Referral placed to [facility] per [facility].  Pt asks that his sister be called and updated on referral. (Zenon Darling [phone])     Loki Jarrell APRN [place] [address] [facility]

## 2021-07-21 LAB
JAK2 QNT, SOURCE: NORMAL
V617 MUTATION, PERCENT: 0 %
V617F MUTATION, QNT: NOT DETECTED

## 2021-07-22 PROBLEM — I50.21 ACUTE SYSTOLIC CONGESTIVE HEART FAILURE (HCC): Status: ACTIVE | Noted: 2021-07-22

## 2021-07-22 PROBLEM — I38 VALVULAR HEART DISEASE: Status: ACTIVE | Noted: 2021-07-22

## 2021-07-28 NOTE — PROGRESS NOTES
[facility] Cardiothoracic Surgery  CONSULTATION      CC: Severe Aortic Stenosis    HPI:  Mr. Nguyen Pena is a 63 y.o.  male who presents with severe aortic stenosis. Pertinent medical history includes combined CHF, hypothyroidism and gout. Cardiac workup has revealed critical aortic stenosis with a significantly reduced EF of 25%. Patient is the sole caregiver for a disabled son and is reliant on employment. He does not currently have short term disability benefits through his employer and verbalizes financial concerns. Patient admits to shortness of breath accompanied by significant edema which has greatly improved since initial hospitalization and resulting medical management. He also reports feelings of lightheadedness which he only experiences when anxious/upset and is resolved with targeted deep breathing. He denies chest pain, fever/chills and weight loss. He has been referred for consideration of SAVR vs TAVR. Patient arrived to clinic with his sister. Surgical and TAVR options discussed at length. Through out conversation patient expressed lack of understanding, directing all discussion to his sister. He answered questions appropriately and engaged with everyone involved with the appointment. There is reasonable concern for cognitive deficit and possible complication with post-operative recovery based on level of understanding. This concern along with patient/family concern regarding financial well-being and care for his dependent child in addition to surgical risks related to reduced EF tend to favor the patient proceeding with TAVR. Ultimately the decision between the two options was left to the patient's discretion. Mr Nguyen Pena with the support of his sister ultimately chose to proceed with TAVR at this time.      PMH:   has a past medical history of Alcohol abuse (7/12/2021), Chronic combined systolic (congestive) and diastolic (congestive) heart failure ([address] (7/19/2021), Hypothyroid (4/20/2015), for dizziness and weakness. Psychiatric/Behavioral: Negative for suicidal ideas. The patient is nervous/anxious. Physical Examination  Vitals:  Vitals:    07/29/21 1031   BP: 103/68   Pulse: 66   Resp: 17   Temp: 97.3 °F (36.3 °C)   SpO2: 95%     Physical Exam  Vitals reviewed. Constitutional:       General: He is not in acute distress. Appearance: Normal appearance. He is normal weight. He is not ill-appearing. HENT:      Head: Normocephalic. Nose: Nose normal.      Mouth/Throat:      Mouth: Mucous membranes are moist.      Pharynx: Oropharynx is clear. Eyes:      Extraocular Movements: Extraocular movements intact. Conjunctiva/sclera: Conjunctivae normal.      Pupils: Pupils are equal, round, and reactive to light. Cardiovascular:      Rate and Rhythm: Normal rate and regular rhythm. Pulses: Normal pulses. Heart sounds: Murmur heard. Pulmonary:      Effort: Pulmonary effort is normal. No respiratory distress. Breath sounds: Normal breath sounds. Abdominal:      Palpations: Abdomen is soft. Tenderness: There is no abdominal tenderness. Musculoskeletal:         General: Normal range of motion. Cervical back: Normal range of motion. Right lower leg: Edema present. Left lower leg: Edema present. Skin:     General: Skin is warm and dry. Capillary Refill: Capillary refill takes less than 2 seconds. Neurological:      General: No focal deficit present. Mental Status: He is alert and oriented to person, place, and time. Mental status is at baseline. Psychiatric:         Attention and Perception: Attention normal.         Mood and Affect: Mood normal.         Behavior: Behavior normal. Behavior is cooperative. Labs:   CBC: No results for input(s): WBC, HGB, HCT, MCV, PLT in the last 72 hours. BMP:No results for input(s): NA, K, CL, CO2, PHOS, BUN, CREATININE, MG in the last 72 hours.     Invalid input(s): CA  Accucheck Glucoses:No results for input(s): POCGLU in the last 72 hours. Cardiac Enzymes: No results for input(s): CKTOTAL, CKMB, CKMBINDEX, TROPONINI in the last 72 hours. PT/INR: No results for input(s): PROTIME, INR in the last 72 hours. APTT: No results for input(s): APTT in the last 72 hours. Liver Profile:  Lab Results   Component Value Date    AST 34 07/12/2021    ALT 26 07/12/2021    BILIDIR 0.33 07/12/2021    BILITOT 0.91 07/12/2021    ALKPHOS 135 07/12/2021     Lab Results   Component Value Date    CHOL 131 07/13/2021    CHOL 152 09/17/2019    HDL 36 07/13/2021    TRIG 74 07/13/2021     TSH:   Lab Results   Component Value Date    TSH 4.00 07/12/2021     UA:   Lab Results   Component Value Date    COLORU DARK YELLOW 07/15/2021    PHUR 6.5 07/15/2021    WBCUA 0 TO 2 07/15/2021    RBCUA 2 TO 5 07/15/2021    MUCUS NOT REPORTED 07/15/2021    TRICHOMONAS NOT REPORTED 07/15/2021    YEAST NOT REPORTED 07/15/2021    BACTERIA NOT REPORTED 07/15/2021    SPECGRAV 1.021 07/15/2021    LEUKOCYTESUR NEGATIVE 07/15/2021    UROBILINOGEN Normal 07/15/2021    BILIRUBINUR NEGATIVE 07/15/2021    GLUCOSEU NEGATIVE 07/15/2021    AMORPHOUS NOT REPORTED 07/15/2021         Testing:  Cardiac cath:   Procedure Summary  1. Mild non-obstructive coronary artery disease  2. Severely impaired LV systolic function. Estimated EF 25%. 3. Severe/Critical aortic stenosis with mean gradient of 79 mm hg and CHERYL 0.36 cm2.  4. Normal right heart pressures. Elevated PCWP  5. Mildly reduced cardiac output and index. Recommendations  Routine Post Diagnostic Cath orders. Medical therapy for cardiomyopathy and diuresis  Heart team consult for evaluation of TAVR/SAVR    Signature  Electronically signed by Russell Adames(Performing Physician) on 07/17/2021 08:30    Angiographic Findings: Cardiac Arteries and Lesion Findings  LMCA: Normal 0% stenosis. LAD: Mild irregularities 10-20%. LCx: Mild irregularities 10-20%.   RCA: Small, non dominant with proximal 60% stenosis  Ramus: Mild irregularities 10-20%. Coronary Tree Dominance: Left      Echo:   Left ventricle is enlarged. Global left ventricular systolic function is  severely reduced. Estimated ejection fraction is 20 % . Calculated EF via 3D Heart Model is 22 %. Moderate left ventricular hypertrophy. Severe global hypokinesis. Grade III (severe) left ventricular diastolic dysfunction. Left atrium is severely dilated. Right atrial dilatation. The aortic valve is severely calcified with restricted mobility. The aortic  valve appears possibly bicuspid but difficult to determine. Severe (possibly critical) aortic stenosis is noted. Velocities may be under estimated due to reduced systolic function. Mean gradient of 43 mmHg is noted. Dimensionless index 0.08, CHERYL 0.33 cm2. Mild aortic insufficiency. Thickened mitral valve leaflets. Mild mitral regurgitation. Mild tricuspid regurgitation. Estimated right ventricular systolic pressure  is 36 mmHg. Trivial pulmonic insufficiency. Signature  ----------------------------------------------------------------------------   Electronically signed by Shanell Buckley(Sonographer) on 07/12/2021   05:27 PM  ----------------------------------------------------------------------------     ----------------------------------------------------------------------------   Electronically signed by Parish Kay(Interpreting physician) on 07/13/2021   12:10 PM  ----------------------------------------------------------------------------  FINDINGS  Left Atrium  Left atrium is severely dilated. Left Ventricle  Left ventricle is enlarged. Global left ventricular systolic function is  severely reduced. Estimated ejection fraction is 20 % . Calculated EF via 3D Heart Model is 22 %. Moderate left ventricular hypertrophy. Severe global hypokinesis. Grade III (severe) left ventricular diastolic dysfunction. Right Atrium  Right atrial dilatation.   Right Ventricle  Normal right ventricular size and function. Mitral Valve  Thickened mitral valve leaflets. Mild mitral regurgitation. No mitral stenosis. Aortic Valve  The aortic valve is severely calcified with restricted mobility. The aortic  valve appears possibly bicuspid but difficult to determine. Severe (possibly critical) aortic stenosis is noted. Velocities may be under estimated due to reduced systolic function. Mean gradient of 43 mmHg is noted. Dimensionless index 0.08, CHERYL 0.33 cm2. Mild aortic insufficiency. Tricuspid Valve  Normal tricuspid valve leaflets. Mild tricuspid regurgitation. No tricuspid stenosis. Estimated right ventricular systolic pressure is 36 mmHg. Pulmonic Valve  Pulmonic valve is normal in structure and function. Trivial pulmonic insufficiency. No evidence of pulmonic stenosis. Pericardial Effusion  No pericardial effusion. CXR:  TWO XRAY VIEWS OF THE CHEST       7/12/2021 9:59 am       COMPARISON:   None.       HISTORY:   ORDERING SYSTEM PROVIDED HISTORY: shortness of breath   TECHNOLOGIST PROVIDED HISTORY:   shortness of breath       FINDINGS:   Frontal and lateral views of the chest are electronically marked regarding   sides.  Marked cardiomegaly.  Pulmonary vascularity is within normal limits. No infiltrates, effusions or pneumothoraces.  Mild-moderate decreased   anterior vertebral body height at the thoracolumbar junction, chronicity   unknown.  There are some marginal osteophytes seen within the spine.           Impression   Prominent cardiomegaly without evidence of CHF or pulmonary edema.       Anterior wedged lower thoracic vertebra, chronicity unknown.           STS calculation:      [email or web address]  Risk of mortality to surgical risk: Low risk < 3%. Intermediate risk 3 to 7%. High risk >/= 8%.   This calculation may or may not accurately reflect the patient's clinic presentation, therefore final risk assessment to be

## 2021-07-29 ENCOUNTER — INITIAL CONSULT (OUTPATIENT)
Dept: CARDIOTHORACIC SURGERY | Age: 61
End: 2021-07-29
Payer: MEDICAID

## 2021-07-29 VITALS
RESPIRATION RATE: 17 BRPM | WEIGHT: 164 LBS | HEIGHT: 71 IN | SYSTOLIC BLOOD PRESSURE: 103 MMHG | HEART RATE: 66 BPM | TEMPERATURE: 97.3 F | DIASTOLIC BLOOD PRESSURE: 68 MMHG | OXYGEN SATURATION: 95 % | BODY MASS INDEX: 22.96 KG/M2

## 2021-07-29 DIAGNOSIS — I35.0 NONRHEUMATIC AORTIC VALVE STENOSIS: Primary | ICD-10-CM

## 2021-07-29 PROCEDURE — 99204 OFFICE O/P NEW MOD 45 MIN: CPT | Performed by: NURSE PRACTITIONER

## 2021-07-29 ASSESSMENT — ENCOUNTER SYMPTOMS
SHORTNESS OF BREATH: 1
COLOR CHANGE: 0
ABDOMINAL PAIN: 0

## 2021-08-05 DIAGNOSIS — I35.0 AORTIC VALVE STENOSIS, ETIOLOGY OF CARDIAC VALVE DISEASE UNSPECIFIED: Primary | ICD-10-CM

## 2021-08-05 DIAGNOSIS — R42 DIZZINESS AND GIDDINESS: ICD-10-CM

## 2021-08-05 DIAGNOSIS — R06.02 SHORTNESS OF BREATH: ICD-10-CM

## 2021-08-09 ENCOUNTER — HOSPITAL ENCOUNTER (OUTPATIENT)
Age: 61
Discharge: HOME | End: 2021-08-09
Payer: COMMERCIAL

## 2021-08-09 DIAGNOSIS — I50.9 CONGESTIVE HEART FAILURE OF UNKNOWN ETIOLOGY (HCC): ICD-10-CM

## 2021-08-09 LAB
ANION GAP SERPL CALCULATED.3IONS-SCNC: 13 MMOL/L (ref 9–17)
BUN BLDV-MCNC: 31 MG/DL (ref 8–23)
BUN/CREAT BLD: ABNORMAL (ref 9–20)
CALCIUM SERPL-MCNC: 9.5 MG/DL (ref 8.6–10.4)
CHLORIDE BLD-SCNC: 103 MMOL/L (ref 98–107)
CO2: 23 MMOL/L (ref 20–31)
CREAT SERPL-MCNC: 1.09 MG/DL (ref 0.7–1.2)
GFR AFRICAN AMERICAN: >60 ML/MIN
GFR NON-AFRICAN AMERICAN: >60 ML/MIN
GFR SERPL CREATININE-BSD FRML MDRD: ABNORMAL ML/MIN/{1.73_M2}
GFR SERPL CREATININE-BSD FRML MDRD: ABNORMAL ML/MIN/{1.73_M2}
GLUCOSE BLD-MCNC: 95 MG/DL (ref 70–99)
MAGNESIUM: 2.3 MG/DL (ref 1.6–2.6)
POTASSIUM SERPL-SCNC: 4.3 MMOL/L (ref 3.7–5.3)
SODIUM BLD-SCNC: 139 MMOL/L (ref 135–144)

## 2021-08-09 PROCEDURE — 83735 ASSAY OF MAGNESIUM: CPT

## 2021-08-09 PROCEDURE — 36415 COLL VENOUS BLD VENIPUNCTURE: CPT

## 2021-08-09 PROCEDURE — 80048 BASIC METABOLIC PNL TOTAL CA: CPT

## 2021-08-17 ENCOUNTER — HOSPITAL ENCOUNTER (OUTPATIENT)
Dept: CARDIOLOGY CLINIC | Age: 61
Discharge: HOME | End: 2021-08-17
Payer: COMMERCIAL

## 2021-08-17 ENCOUNTER — HOSPITAL ENCOUNTER (OUTPATIENT)
Dept: CT IMAGING | Age: 61
Discharge: HOME | End: 2021-08-19
Payer: COMMERCIAL

## 2021-08-17 ENCOUNTER — HOSPITAL ENCOUNTER (OUTPATIENT)
Dept: PULMONOLOGY | Age: 61
Discharge: HOME | End: 2021-08-17
Payer: COMMERCIAL

## 2021-08-17 ENCOUNTER — HOSPITAL ENCOUNTER (OUTPATIENT)
Dept: VASCULAR LAB | Age: 61
Discharge: HOME | End: 2021-08-17
Payer: COMMERCIAL

## 2021-08-17 DIAGNOSIS — I25.10 CORONARY ARTERY DISEASE INVOLVING NATIVE CORONARY ARTERY OF NATIVE HEART WITHOUT ANGINA PECTORIS: ICD-10-CM

## 2021-08-17 DIAGNOSIS — R42 DIZZINESS AND GIDDINESS: ICD-10-CM

## 2021-08-17 DIAGNOSIS — I50.42 CHRONIC COMBINED SYSTOLIC (CONGESTIVE) AND DIASTOLIC (CONGESTIVE) HEART FAILURE (HCC): ICD-10-CM

## 2021-08-17 DIAGNOSIS — I65.21 STENOSIS OF RIGHT CAROTID ARTERY: ICD-10-CM

## 2021-08-17 DIAGNOSIS — Z87.891 FORMER SMOKER: ICD-10-CM

## 2021-08-17 DIAGNOSIS — Z82.49 FAMILY HISTORY OF PREMATURE CAD: ICD-10-CM

## 2021-08-17 DIAGNOSIS — I35.0 AORTIC VALVE STENOSIS, ETIOLOGY OF CARDIAC VALVE DISEASE UNSPECIFIED: ICD-10-CM

## 2021-08-17 DIAGNOSIS — F41.9 ANXIETY: ICD-10-CM

## 2021-08-17 DIAGNOSIS — I35.0 NONRHEUMATIC AORTIC VALVE STENOSIS: Primary | ICD-10-CM

## 2021-08-17 PROBLEM — I50.21 ACUTE SYSTOLIC CONGESTIVE HEART FAILURE (HCC): Status: RESOLVED | Noted: 2021-07-22 | Resolved: 2021-08-17

## 2021-08-17 PROBLEM — R59.0 MEDIASTINAL LYMPHADENOPATHY: Status: ACTIVE | Noted: 2021-08-17

## 2021-08-17 PROBLEM — F17.200 SMOKING: Status: RESOLVED | Noted: 2021-07-12 | Resolved: 2021-08-17

## 2021-08-17 PROCEDURE — 94729 DIFFUSING CAPACITY: CPT

## 2021-08-17 PROCEDURE — 99215 OFFICE O/P EST HI 40 MIN: CPT | Performed by: NURSE PRACTITIONER

## 2021-08-17 PROCEDURE — 94060 EVALUATION OF WHEEZING: CPT

## 2021-08-17 PROCEDURE — 74174 CTA ABD&PLVS W/CONTRAST: CPT

## 2021-08-17 PROCEDURE — 75572 CT HRT W/3D IMAGE: CPT

## 2021-08-17 PROCEDURE — 93880 EXTRACRANIAL BILAT STUDY: CPT

## 2021-08-17 PROCEDURE — 6360000004 HC RX CONTRAST MEDICATION: Performed by: NURSE PRACTITIONER

## 2021-08-17 PROCEDURE — 94664 DEMO&/EVAL PT USE INHALER: CPT

## 2021-08-17 PROCEDURE — 94640 AIRWAY INHALATION TREATMENT: CPT

## 2021-08-17 PROCEDURE — 94726 PLETHYSMOGRAPHY LUNG VOLUMES: CPT

## 2021-08-17 RX ADMIN — IOPAMIDOL 180 ML: 755 INJECTION, SOLUTION INTRAVENOUS at 09:13

## 2021-08-17 ASSESSMENT — ENCOUNTER SYMPTOMS
ABDOMINAL PAIN: 0
SHORTNESS OF BREATH: 1
COUGH: 1
EYE DISCHARGE: 0
BLOOD IN STOOL: 0

## 2021-08-17 NOTE — PROGRESS NOTES
Date:  2021  Time:  3:39 PM    Valve Clinic at [facility]    Office: [phone] Fax: [phone]    Re:  [employer]   Patient : 1960    +  shortness of breath with activity, none at rest.   Works at [employer], unloads carts of work and has modified work to reduce SOB   minimal activity at home. Has lost 'drive\"   +  Fatigue, modifies activity significantly   Denies Edema currently , reports significant edema prior to admit on 2021   Denies chest pain   +   palpitations ,assoc w/anxity   No orthopnea , nor PND     Has taken valium for anxiety in the past. currently on no meds for anxiety     OA in L hip, using cane  Needing surgery, but has put off d/t lack of  insurance   Pain at worst 7/10     This medical diagnosis has been difficult for pt. Pt does drive but does not want to drive to medical appts or lab as his anxiety makes him forget improtant things and thus asks family to help him. Pt's  son is bipolar/schizophrenic , lives with pt. Pt  has difficulty/anxiety in  dealing with child. No dizziness or LH. No weakness, numbness or lack of coordination/balance. No speech or cognition issues. No recent acute  vision changes. Has a h/o floaters that is unchanged. No HA. Review of Systems   Constitutional: Positive for fatigue. Negative for activity change, chills and fever. Eyes: Negative for discharge and visual disturbance. Respiratory: Positive for cough (rare ) and shortness of breath. Cardiovascular: Positive for palpitations. Negative for chest pain and leg swelling. Gastrointestinal: Negative for abdominal pain and blood in stool. Endocrine: Negative for cold intolerance and heat intolerance. Genitourinary: Negative for dysuria and flank pain. Musculoskeletal: Positive for arthralgias and gait problem. Negative for joint swelling and myalgias. Using cane   Skin: Negative for pallor and rash.    Neurological: Negative for ALKPHOS 135 07/12/2021    AST 34 07/12/2021    ALT 26 07/12/2021           CHF Functional Classification / Stages:    Diagnosis of CHF    CHF    NYHA Functional Classification I  MilD    []  II  Mild    []  III Moderate    [x]  IV Severe    []               [address] Cardiomyopathy Questionnaire [place])  The following questions refer to your heart failure and how it may affect your life. Please read and complete the  following questions. There are no right or wrong answers. Please юлия the answer that best applies to you. 1. Heart failure affects different people in different ways. Some feel shortness of breath while others feel fatigue. Please indicate how much you are limited by heart failure (shortness of breath or fatigue) in your ability to do  the following activities over the past 2 weeks. Activity                       Extremely      Limited Quite a bit    Limited Moderatly    Limited Slightly         Limited Not at all      Limted Limited for other   reasons / or no activity    1 2 3 4 5 6   a. Showering/bathing   []  []  []  [x]  []  []    b. Walking 1 block on level ground   []  []   []  [x]   []  []    c. Hurrying or jogging (As if to catch a bus) [x]  []   []  []    []  []   Total Score:               2. Over the past 2 weeks, how many times did you have swelling in your feet, ankles or legs when you woke up in the morning? Every morning 3 or more  times per wk  but not every day 1-2 times      a week Less than     once/ week Never over  past 2 weeks   1 2 3 4 5   []   []  []  []  [x]   Total Score             3. Over the past 2 weeks, on average, how many times has fatigue limited your ability to do what you wanted?     All the time Several times  per day At least  once a day 3 or more times /  week but not daily 1-2 time/  week < 1/week Nver in last  2 weeks   1 2 3 4 5 6 7   []    []  [x]   []   []  []  []  Total Score               4. Over the past 2 weeks, on average, how many times has shortness of breath limited your ability to do what you wanted? All the time Several times  per day At least  once a day 3 or more times /  week but not daily 1-2 time/  week < 1/week Nver in last  2 weeks   1 2 3 4 5 6 7    []   []   [x]  []   []   []   []   Total Score               5.Over the past 2 weeks, on average, how many times have you been forced to sleep sitting up in a chair or with at  least 3 pillows to prop you up because of shortness of breath? Every night 3 or more  times per wk  but not every day 1-2 times      a week Less than     once/ week Never over  past 2 weeks   1 2 3 4 5    []  []   []   []   [x]  Total Score             6.Over the past 2 weeks, how much has your heart failure limited your enjoyment of life? It has extremely   limited my  enjoyment of life It has limited  my enjoyment  of life quite a bit It has moderately  limited my  enjoyment of life It has slightly  limited my  enjoyment of life It has not  limited my enjoyment  of life at all   1 2 3 4 5   []     []     [x]     []     []     Total Score             7. If you had to spend the rest of your life with your heart failure the way it is right now, how would you feel about this? Not at all   satisfied Mostly   dissatisfied Somewhat  satisfied Mostly  satisfied Completely  satisfied   1 2 3 4 5    [x]  []   []    []   []  Total Score             8.How much does your heart failure affect your lifestyle? Please indicate how your heart failure may have limited  your participation in the following activities over the past 2 weeks. Activity                       Severely      Limited Limited  Quite a bit   Moderately   Limited Slightly         Limited Did not      Limit at all Does not apply  or did not do   for other reasons    1 2 3 4 5 6   a. Hobbies/recreational activities  []   []  []   [x]   []   []     b.  Working or doing household chores  []   []  [x]    []   []  []     c. Visiting family or friends out of the home []    []  []   [x]    []  []   Total Score: Total Score Now:    40      Cardiac Catheterization   Procedure Summary      1. Mild non-obstructive coronary artery disease   2. Severely impaired LV systolic function. Estimated EF 25%. 3. Severe/Critical aortic stenosis with mean gradient of 79 mm hg and CHERYL   0.36 cm2.   4. Normal right heart pressures. Elevated PCWP   5. Mildly reduced cardiac output and index. Echo CONCLUSIONS     Summary  Left ventricle is enlarged. Global left ventricular systolic function is  severely reduced. Estimated ejection fraction is 20 % . Calculated EF via 3D Heart Model is 22 %. Moderate left ventricular hypertrophy. Severe global hypokinesis. Grade III (severe) left ventricular diastolic dysfunction. Left atrium is severely dilated. Right atrial dilatation. The aortic valve is severely calcified with restricted mobility. The aortic  valve appears possibly bicuspid but difficult to determine. Severe (possibly  critical) aortic stenosis is noted. Velocities may be under estimated due to  reduced systolic function. Mean gradient of 43 mmHg is noted. Dimensionless index 0.08, CHERYL 0.33 cm2. Mild aortic insufficiency. Thickened mitral valve leaflets. Mild mitral regurgitation. Mild tricuspid regurgitation. Estimated right ventricular systolic pressure  is 36 mmHg.   Trivial pulmonic insufficiency.     Signature  ----------------------------------------------------------------------------   Electronically signed by Shanell Buckley(Sonographer) on 07/12/2021        Diagnostic Testing:    [x]  R&[facility] Heart Cardiac Catheterization Date Completed: 7/12/21    [x]  Echo     Date Completed: 7/12/21   [x]  MSCT     Date Completed: 8/17/21      [x]  PFT     Date Completed: 08/18/21      [x]  Carotid Ultrasound   Date Completed: 08/18/21       Functional/Physical Evaluation   [x]  FTPQ39   Score: 40  Date Completed: 08/18/21       [x] MMSE   Score: 28/30  Date Completed: 08/18/21        [x]  NARAYAN ADL's  Score: 6/6  Date Completed: 08/18/21      [x]  5 Meter Walk Test Score: 5.5, 4.6, 4.5 sec  Date Completed: 08/18/21        [x]   Test   Score: 30, 30, 30 kg   Date Completed: 08/18/21       Assessment:  1. Nonrheumatic aortic valve stenosis    2. Chronic combined systolic (congestive) and diastolic (congestive) heart failure ([address]    3. Family history of premature CAD    4. Anxiety    5. Coronary artery disease involving native coronary artery of native heart without angina pectoris    6. Former smoker    7. Stenosis of right carotid artery         Plan: proceed with TAVR  Pt will have f/u labs done in 3-5 d following receipt of IV contrast.     We are notified by Lisy Sharma, pt has totally occluded R ICA, with L ICA stenosis <50%. [facility] has called for a STAT read with Dr Naveed Nation. Dr Ellen Hancock was contacted by writer re: R ICA occlusion and discussed absence of symptoms in setting of new positive finding. Writer relays that Dr Dennis Levine will be consulted with the hopes of pt being seen by Dr Dennis Levine on Fri 8/20/21. Dr Ellen Hancock gives [place] for pt to leave his TAVR Nursing visit at [facility]  today. Pt and sister Ariel Dash are updated on all. Patient and sister verbalize understanding that TAVR plan is contingent upon Dr Dennis Levine giving clearance to go ahead with the planned TAVR for Tues 8/24/21. Referral placed to Vasc Surg, dr Dennis Levine, and physician was updated via [employer]      Education on TAVR procedure completed. Assessment/questionaires completed today, including MMSE, KCCQ, Narayan ADLs, 5 Meter walk test, and Frailty/ test. Heart/valve models displayed and described. Projected length of stay and follow up appointments discussed. All questions were answered to patient's satisfaction. Patient and family member  (sister)  verbalized  understanding of teaching.    Discussed benefits, risks  (need for PPM, bleeding, vessel injury, CVA, MI,

## 2021-08-19 PROBLEM — J44.9 COPD (CHRONIC OBSTRUCTIVE PULMONARY DISEASE) (HCC): Status: ACTIVE | Noted: 2021-08-19

## 2021-08-20 ENCOUNTER — INITIAL CONSULT (OUTPATIENT)
Dept: VASCULAR SURGERY | Age: 61
End: 2021-08-20
Payer: COMMERCIAL

## 2021-08-20 VITALS
HEART RATE: 78 BPM | OXYGEN SATURATION: 94 % | BODY MASS INDEX: 24.05 KG/M2 | SYSTOLIC BLOOD PRESSURE: 103 MMHG | WEIGHT: 168 LBS | TEMPERATURE: 97.7 F | RESPIRATION RATE: 16 BRPM | HEIGHT: 70 IN | DIASTOLIC BLOOD PRESSURE: 68 MMHG

## 2021-08-20 DIAGNOSIS — I65.22 CAROTID STENOSIS, ASYMPTOMATIC, LEFT: ICD-10-CM

## 2021-08-20 DIAGNOSIS — I65.21 CAROTID OCCLUSION, RIGHT: Primary | ICD-10-CM

## 2021-08-20 PROCEDURE — 99204 OFFICE O/P NEW MOD 45 MIN: CPT | Performed by: SURGERY

## 2021-08-20 ASSESSMENT — ENCOUNTER SYMPTOMS
ABDOMINAL PAIN: 0
SORE THROAT: 0
SHORTNESS OF BREATH: 1

## 2021-08-20 NOTE — PROGRESS NOTES
Division of Vascular Surgery        New Consult      Physician Requesting Consult:  Lester Perez    Reason for Consult:   Right internal carotid occlusion    Chief Complaint:      shortness of breath    History of Present Illness:      Ivett Mosley is a 63 y.o. male with shortness of breath, aortic stenosis in the preoperative planning phase for a TAVR. Carotid duplex revealed occluded right internal carotid artery. Left ICA with <50% stenosis. The patient is not symptomatic. No weakness of facial muscles, extremities, no slurred speech, no history of stroke or TIA. Endorses hip pain but denies claudication symptoms. Medical History:     Past Medical History:   Diagnosis Date    Acute diastolic congestive heart failure (HCC)     Acute systolic congestive heart failure ([facility] [address] 07/22/2021    Alcohol abuse 07/12/2021    Anxiety     Chronic combined systolic (congestive) and diastolic (congestive) heart failure ([facility] [address] 07/19/2021    Hypothyroid 04/20/2015    Osteoarthritis     Smoking 07/12/2021    Vasovagal reaction 07/2021       Surgical History:     No past surgical history on file. Family History:     Family History   Problem Relation Age of Onset    Coronary Art Dis Father 27        CABG x 3       Allergies:       Patient has no known allergies.     Medications:      Current Outpatient Medications   Medication Sig Dispense Refill    meloxicam (MOBIC) 7.5 MG tablet TAKE 1 TAB BY MOUTH ONCE A DAY  90 tablet 0    levothyroxine (SYNTHROID) 75 MCG tablet TAKE 1 TAB BY MOUTH ONCE A DAY  90 tablet 0    aspirin 81 MG chewable tablet Take 1 tablet by mouth daily 30 tablet 3    atorvastatin (LIPITOR) 20 MG tablet Take 1 tablet by mouth nightly 30 tablet 3    carvedilol (COREG) 3.125 MG tablet Take 1 tablet by mouth 2 times daily (with meals) 60 tablet 3    furosemide (LASIX) 20 MG tablet Take 1 tablet by mouth 2 times daily 60 tablet 3    folic acid (FOLVITE) 1 MG tablet Take 1 tablet by mouth daily 30 tablet 3    thiamine mononitrate (THIAMINE) 100 MG tablet Take 1 tablet by mouth daily 30 tablet 3    HYDROcodone-acetaminophen (NORCO) 7.5-325 MG per tablet Take 1 tablet by mouth every 6 hours as needed for Pain. No current facility-administered medications for this visit. Social History:     Tobacco:    reports that he quit smoking about 6 weeks ago. His smoking use included cigarettes. He has a 30.00 pack-year smoking history. He has never used smokeless tobacco.  Alcohol:      reports current alcohol use. Drug Use:  reports no history of drug use. 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 HENT: Negative for sore throat. Eyes: Negative for visual disturbance. Respiratory: Positive for shortness of breath. Cardiovascular: Negative for chest pain. Gastrointestinal: Negative for abdominal pain. Endocrine: Negative. Genitourinary: Negative for dysuria. Musculoskeletal: Positive for arthralgias. Hip pain   Skin: Negative for rash and wound. Allergic/Immunologic: Negative. Neurological: Negative for facial asymmetry, weakness, light-headedness and numbness. Hematological: Negative. Psychiatric/Behavioral: Negative. Physical Exam:     Vitals:  /68 (Site: Right Upper Arm, Position: Sitting, Cuff Size: Medium Adult)   Pulse 78   Temp 97.7 °F (36.5 °C) (Temporal)   Resp 16   Ht 5' 10\" (1.778 m) Comment: per patient  Wt 168 lb (76.2 kg)   SpO2 94%   BMI 24.11 kg/m²     Physical Exam  Constitutional:       Appearance: Normal appearance. He is well-developed and well-groomed. HENT:      Head: Normocephalic and atraumatic. Eyes:      Extraocular Movements: Extraocular movements intact. Conjunctiva/sclera: Conjunctivae normal.      Pupils: Pupils are equal, round, and reactive to light. Neck:      Vascular: No carotid bruit.    Cardiovascular:      Rate and Rhythm: Normal rate and regular rhythm. Pulses: Normal pulses. Radial pulses are 2+ on the right side and 2+ on the left side. Dorsalis pedis pulses are 2+ on the right side and 2+ on the left side. Posterior tibial pulses are 2+ on the right side and 2+ on the left side. Pulmonary:      Effort: Pulmonary effort is normal. No respiratory distress. Abdominal:      General: There is no distension. Palpations: Abdomen is soft. Tenderness: There is no abdominal tenderness. Musculoskeletal:         General: Normal range of motion. Cervical back: Full passive range of motion without pain. Right lower leg: No swelling or tenderness. No edema. Left lower leg: No swelling or tenderness. No edema. Right foot: Normal capillary refill. No swelling or tenderness. Left foot: Normal capillary refill. No swelling or tenderness. Feet:      Right foot:      Skin integrity: No ulcer or skin breakdown. Left foot:      Skin integrity: No ulcer or skin breakdown. Skin:     General: Skin is warm and dry. Capillary Refill: Capillary refill takes less than 2 seconds. Neurological:      General: No focal deficit present. Mental Status: He is alert and oriented to person, place, and time. GCS: GCS eye subscore is 4. GCS verbal subscore is 5. GCS motor subscore is 6. Sensory: Sensation is intact. Motor: Motor function is intact. Psychiatric:         Mood and Affect: Mood normal.         Speech: Speech normal.         Behavior: Behavior normal.         Thought Content:  Thought content normal.        Imaging/Labs:     Carotid duplex (8/17/21) reveals right internal carotid artery occlusion, left internal carotid artery <50% stenosis    Assessment and Plan:     Asymptomatic right ICA occlusion, left ICA <50% stenosis  · Continue optimal medical management  · Nonoperative management of right internal carotid stenosis  · OK to proceed with TAVR as scheduled  · Follow up in 1 year with repeat duplex for surveillance    Optimal medical management is an important part of overall treatment of all patients with carotid bifurcation disease, regardless of the degree of stenosis or the plan for intervention. This therapy is directed both at the reduction of stroke and overall cardiovascular events, including cardiovascular-related mortality. Clinical guidelines issued by the American Heart Association and the American Stroke Association recommends:    I. Lowering blood pressure to a target 140/90 mm Hg by lifestyle interventions and antihypertensive treatment is recommended in individuals who have hypertension with asymptomatic carotid atherosclerosis. II. Glucose control to nearly normoglycemic levels (target hemoglobin A1C 7%) is recommended among diabetic patients to reduce microvascular complications and, with lesser certainty, macrovascular complications other than stroke. III. Patients with known atherosclerosis have demonstrated reduced stroke rates when treated with lipid-lowering therapy. And continued low dose statin will help stabilize plaque and decrease the inflammatory response of atherosclerosis. IV. Smoking nearly doubles the risk of stroke and with cessation there is a reduction in the risk of stroke. V. Antiplatelet therapy in asymptomatic patients with carotid atherosclerosis is recommended to reduce overall cardiovascular morbidity although it has not been shown to be effective in the primary prevention of stroke. Electronically signed by Chani Montana MD       [address] [place] [address]  Office: [phone]  Cell: [phone]  Email: [email or web address]

## 2021-08-21 ENCOUNTER — HOSPITAL ENCOUNTER (OUTPATIENT)
Age: 61
Discharge: HOME | DRG: 183 | End: 2021-08-21
Payer: COMMERCIAL

## 2021-08-21 DIAGNOSIS — I35.0 NONRHEUMATIC AORTIC VALVE STENOSIS: ICD-10-CM

## 2021-08-21 LAB
ANION GAP SERPL CALCULATED.3IONS-SCNC: 7 MMOL/L (ref 9–17)
BUN BLDV-MCNC: 30 MG/DL (ref 8–23)
BUN/CREAT BLD: ABNORMAL (ref 9–20)
CALCIUM SERPL-MCNC: 9.7 MG/DL (ref 8.6–10.4)
CHLORIDE BLD-SCNC: 103 MMOL/L (ref 98–107)
CO2: 25 MMOL/L (ref 20–31)
CREAT SERPL-MCNC: 0.91 MG/DL (ref 0.7–1.2)
GFR AFRICAN AMERICAN: >60 ML/MIN
GFR NON-AFRICAN AMERICAN: >60 ML/MIN
GFR SERPL CREATININE-BSD FRML MDRD: ABNORMAL ML/MIN/{1.73_M2}
GFR SERPL CREATININE-BSD FRML MDRD: ABNORMAL ML/MIN/{1.73_M2}
GLUCOSE BLD-MCNC: 94 MG/DL (ref 70–99)
POTASSIUM SERPL-SCNC: 4.1 MMOL/L (ref 3.7–5.3)
SODIUM BLD-SCNC: 135 MMOL/L (ref 135–144)

## 2021-08-21 PROCEDURE — 80048 BASIC METABOLIC PNL TOTAL CA: CPT

## 2021-08-21 PROCEDURE — 36415 COLL VENOUS BLD VENIPUNCTURE: CPT

## 2021-08-23 ENCOUNTER — ANESTHESIA EVENT (OUTPATIENT)
Dept: CARDIAC CATH/INVASIVE PROCEDURES | Age: 61
End: 2021-08-23

## 2021-08-24 ENCOUNTER — HOSPITAL ENCOUNTER (INPATIENT)
Dept: CARDIAC CATH/INVASIVE PROCEDURES | Age: 61
LOS: 3 days | Discharge: HOME | DRG: 183 | End: 2021-08-27
Attending: INTERNAL MEDICINE | Admitting: INTERNAL MEDICINE
Payer: COMMERCIAL

## 2021-08-24 ENCOUNTER — ANESTHESIA (OUTPATIENT)
Dept: CARDIAC CATH/INVASIVE PROCEDURES | Age: 61
End: 2021-08-24

## 2021-08-24 VITALS — SYSTOLIC BLOOD PRESSURE: 108 MMHG | OXYGEN SATURATION: 99 % | TEMPERATURE: 93.8 F | DIASTOLIC BLOOD PRESSURE: 61 MMHG

## 2021-08-24 DIAGNOSIS — Z95.2 S/P TAVR (TRANSCATHETER AORTIC VALVE REPLACEMENT): ICD-10-CM

## 2021-08-24 LAB
% CKMB: 4.3 % (ref 0–3.5)
ACTIVATED CLOTTING TIME: 227 SEC (ref 79–149)
ALBUMIN SERPL-MCNC: 4.1 G/DL (ref 3.5–5.2)
ANION GAP SERPL CALCULATED.3IONS-SCNC: 8 MMOL/L (ref 9–17)
BILIRUB SERPL-MCNC: 0.8 MG/DL (ref 0.3–1.2)
BNP INTERPRETATION: ABNORMAL
BUN BLDV-MCNC: 29 MG/DL (ref 8–23)
BUN/CREAT BLD: ABNORMAL (ref 9–20)
CALCIUM SERPL-MCNC: 10.1 MG/DL (ref 8.6–10.4)
CHLORIDE BLD-SCNC: 106 MMOL/L (ref 98–107)
CK MB: 5.8 NG/ML
CKMB INTERPRETATION: ABNORMAL
CO2: 25 MMOL/L (ref 20–31)
CREAT SERPL-MCNC: 0.81 MG/DL (ref 0.7–1.2)
GFR AFRICAN AMERICAN: >60 ML/MIN
GFR NON-AFRICAN AMERICAN: >60 ML/MIN
GFR NON-AFRICAN AMERICAN: >60 ML/MIN
GFR SERPL CREATININE-BSD FRML MDRD: >60 ML/MIN
GFR SERPL CREATININE-BSD FRML MDRD: ABNORMAL ML/MIN/{1.73_M2}
GFR SERPL CREATININE-BSD FRML MDRD: ABNORMAL ML/MIN/{1.73_M2}
GFR SERPL CREATININE-BSD FRML MDRD: NORMAL ML/MIN/{1.73_M2}
GLUCOSE BLD-MCNC: 100 MG/DL (ref 74–100)
GLUCOSE BLD-MCNC: 97 MG/DL (ref 70–99)
HCT VFR BLD CALC: 42.3 % (ref 40.7–50.3)
HEMOGLOBIN: 13.7 G/DL (ref 13–17)
INR BLD: 1.1
MAGNESIUM: 2.3 MG/DL (ref 1.6–2.6)
MCH RBC QN AUTO: 32.1 PG (ref 25.2–33.5)
MCHC RBC AUTO-ENTMCNC: 32.4 G/DL (ref 28.4–34.8)
MCV RBC AUTO: 99.1 FL (ref 82.6–102.9)
NRBC AUTOMATED: 0 PER 100 WBC
PDW BLD-RTO: 13.8 % (ref 11.8–14.4)
PLATELET # BLD: 163 K/UL (ref 138–453)
PMV BLD AUTO: 11 FL (ref 8.1–13.5)
POC BUN: 36 MG/DL (ref 8–26)
POC CHLORIDE: 110 MMOL/L (ref 98–107)
POC CREATININE: 0.76 MG/DL (ref 0.51–1.19)
POC HEMATOCRIT: 44 % (ref 41–53)
POC HEMOGLOBIN: 14.9 G/DL (ref 13.5–17.5)
POC POTASSIUM: 4.3 MMOL/L (ref 3.5–4.5)
POC SODIUM: 141 MMOL/L (ref 138–146)
POTASSIUM SERPL-SCNC: 4.4 MMOL/L (ref 3.7–5.3)
PRO-BNP: 3464 PG/ML
PROTHROMBIN TIME: 12.1 SEC (ref 9.1–12.3)
RBC # BLD: 4.27 M/UL (ref 4.21–5.77)
SODIUM BLD-SCNC: 139 MMOL/L (ref 135–144)
TOTAL CK: 136 U/L (ref 39–308)
TROPONIN INTERP: ABNORMAL
TROPONIN T: ABNORMAL NG/ML
TROPONIN, HIGH SENSITIVITY: 81 NG/L (ref 0–22)
WBC # BLD: 5.3 K/UL (ref 3.5–11.3)

## 2021-08-24 PROCEDURE — 5A2204Z RESTORATION OF CARDIAC RHYTHM, SINGLE: ICD-10-PCS | Performed by: THORACIC SURGERY (CARDIOTHORACIC VASCULAR SURGERY)

## 2021-08-24 PROCEDURE — 6360000002 HC RX W HCPCS: Performed by: INTERNAL MEDICINE

## 2021-08-24 PROCEDURE — 80048 BASIC METABOLIC PNL TOTAL CA: CPT

## 2021-08-24 PROCEDURE — 6370000000 HC RX 637 (ALT 250 FOR IP)

## 2021-08-24 PROCEDURE — 82550 ASSAY OF CK (CPK): CPT

## 2021-08-24 PROCEDURE — 86850 RBC ANTIBODY SCREEN: CPT

## 2021-08-24 PROCEDURE — 36415 COLL VENOUS BLD VENIPUNCTURE: CPT

## 2021-08-24 PROCEDURE — 6360000002 HC RX W HCPCS

## 2021-08-24 PROCEDURE — 93005 ELECTROCARDIOGRAM TRACING: CPT | Performed by: NURSE PRACTITIONER

## 2021-08-24 PROCEDURE — 5A1223Z PERFORMANCE OF CARDIAC PACING, CONTINUOUS: ICD-10-PCS | Performed by: THORACIC SURGERY (CARDIOTHORACIC VASCULAR SURGERY)

## 2021-08-24 PROCEDURE — 82565 ASSAY OF CREATININE: CPT

## 2021-08-24 PROCEDURE — 2000000000 HC ICU R&B

## 2021-08-24 PROCEDURE — C1725 CATH, TRANSLUMIN NON-LASER: HCPCS

## 2021-08-24 PROCEDURE — 3700000000 HC ANESTHESIA ATTENDED CARE

## 2021-08-24 PROCEDURE — 83880 ASSAY OF NATRIURETIC PEPTIDE: CPT

## 2021-08-24 PROCEDURE — 2500000003 HC RX 250 WO HCPCS: Performed by: NURSE ANESTHETIST, CERTIFIED REGISTERED

## 2021-08-24 PROCEDURE — 6360000002 HC RX W HCPCS: Performed by: STUDENT IN AN ORGANIZED HEALTH CARE EDUCATION/TRAINING PROGRAM

## 2021-08-24 PROCEDURE — 86901 BLOOD TYPING SEROLOGIC RH(D): CPT

## 2021-08-24 PROCEDURE — 6370000000 HC RX 637 (ALT 250 FOR IP): Performed by: INTERNAL MEDICINE

## 2021-08-24 PROCEDURE — 02RF38Z REPLACEMENT OF AORTIC VALVE WITH ZOOPLASTIC TISSUE, PERCUTANEOUS APPROACH: ICD-10-PCS | Performed by: THORACIC SURGERY (CARDIOTHORACIC VASCULAR SURGERY)

## 2021-08-24 PROCEDURE — 85347 COAGULATION TIME ACTIVATED: CPT

## 2021-08-24 PROCEDURE — 86900 BLOOD TYPING SEROLOGIC ABO: CPT

## 2021-08-24 PROCEDURE — 6370000000 HC RX 637 (ALT 250 FOR IP): Performed by: STUDENT IN AN ORGANIZED HEALTH CARE EDUCATION/TRAINING PROGRAM

## 2021-08-24 PROCEDURE — 82553 CREATINE MB FRACTION: CPT

## 2021-08-24 PROCEDURE — 84132 ASSAY OF SERUM POTASSIUM: CPT

## 2021-08-24 PROCEDURE — C1894 INTRO/SHEATH, NON-LASER: HCPCS

## 2021-08-24 PROCEDURE — 2580000003 HC RX 258: Performed by: NURSE PRACTITIONER

## 2021-08-24 PROCEDURE — 84295 ASSAY OF SERUM SODIUM: CPT

## 2021-08-24 PROCEDURE — 2500000003 HC RX 250 WO HCPCS

## 2021-08-24 PROCEDURE — 6360000004 HC RX CONTRAST MEDICATION

## 2021-08-24 PROCEDURE — C1769 GUIDE WIRE: HCPCS

## 2021-08-24 PROCEDURE — 2580000003 HC RX 258: Performed by: NURSE ANESTHETIST, CERTIFIED REGISTERED

## 2021-08-24 PROCEDURE — 84484 ASSAY OF TROPONIN QUANT: CPT

## 2021-08-24 PROCEDURE — 85014 HEMATOCRIT: CPT

## 2021-08-24 PROCEDURE — 33361 REPLACE AORTIC VALVE PERQ: CPT | Performed by: INTERNAL MEDICINE

## 2021-08-24 PROCEDURE — 7100000001 HC PACU RECOVERY - ADDTL 15 MIN

## 2021-08-24 PROCEDURE — 6360000002 HC RX W HCPCS: Performed by: NURSE ANESTHETIST, CERTIFIED REGISTERED

## 2021-08-24 PROCEDURE — 2709999900 HC NON-CHARGEABLE SUPPLY

## 2021-08-24 PROCEDURE — 2720000010 HC SURG SUPPLY STERILE

## 2021-08-24 PROCEDURE — 84520 ASSAY OF UREA NITROGEN: CPT

## 2021-08-24 PROCEDURE — 3700000001 HC ADD 15 MINUTES (ANESTHESIA)

## 2021-08-24 PROCEDURE — 83735 ASSAY OF MAGNESIUM: CPT

## 2021-08-24 PROCEDURE — 2580000003 HC RX 258

## 2021-08-24 PROCEDURE — 82040 ASSAY OF SERUM ALBUMIN: CPT

## 2021-08-24 PROCEDURE — 86920 COMPATIBILITY TEST SPIN: CPT

## 2021-08-24 PROCEDURE — 33361 REPLACE AORTIC VALVE PERQ: CPT | Performed by: THORACIC SURGERY (CARDIOTHORACIC VASCULAR SURGERY)

## 2021-08-24 PROCEDURE — 82435 ASSAY OF BLOOD CHLORIDE: CPT

## 2021-08-24 PROCEDURE — 82947 ASSAY GLUCOSE BLOOD QUANT: CPT

## 2021-08-24 PROCEDURE — 85610 PROTHROMBIN TIME: CPT

## 2021-08-24 PROCEDURE — 7100000000 HC PACU RECOVERY - FIRST 15 MIN

## 2021-08-24 PROCEDURE — 2580000003 HC RX 258: Performed by: INTERNAL MEDICINE

## 2021-08-24 PROCEDURE — C1760 CLOSURE DEV, VASC: HCPCS

## 2021-08-24 PROCEDURE — 85027 COMPLETE CBC AUTOMATED: CPT

## 2021-08-24 PROCEDURE — 2780000006 HC MISC HEART VALVE

## 2021-08-24 PROCEDURE — 82247 BILIRUBIN TOTAL: CPT

## 2021-08-24 RX ORDER — PROPOFOL 10 MG/ML
INJECTION, EMULSION INTRAVENOUS CONTINUOUS PRN
Status: DISCONTINUED | OUTPATIENT
Start: 2021-08-24 | End: 2021-08-24 | Stop reason: SDUPTHER

## 2021-08-24 RX ORDER — CEFAZOLIN SODIUM 1 G/3ML
INJECTION, POWDER, FOR SOLUTION INTRAMUSCULAR; INTRAVENOUS PRN
Status: DISCONTINUED | OUTPATIENT
Start: 2021-08-24 | End: 2021-08-24 | Stop reason: SDUPTHER

## 2021-08-24 RX ORDER — MIDAZOLAM HYDROCHLORIDE 1 MG/ML
INJECTION INTRAMUSCULAR; INTRAVENOUS PRN
Status: DISCONTINUED | OUTPATIENT
Start: 2021-08-24 | End: 2021-08-24 | Stop reason: SDUPTHER

## 2021-08-24 RX ORDER — HYDROCODONE BITARTRATE AND ACETAMINOPHEN 5; 325 MG/1; MG/1
1 TABLET ORAL EVERY 4 HOURS PRN
Status: DISCONTINUED | OUTPATIENT
Start: 2021-08-24 | End: 2021-08-24

## 2021-08-24 RX ORDER — CLOPIDOGREL 300 MG/1
300 TABLET, FILM COATED ORAL ONCE
Status: COMPLETED | OUTPATIENT
Start: 2021-08-24 | End: 2021-08-24

## 2021-08-24 RX ORDER — ACETAMINOPHEN 325 MG/1
650 TABLET ORAL EVERY 4 HOURS PRN
Status: DISCONTINUED | OUTPATIENT
Start: 2021-08-24 | End: 2021-08-27 | Stop reason: HOSPADM

## 2021-08-24 RX ORDER — ASPIRIN 81 MG/1
81 TABLET, CHEWABLE ORAL DAILY
Status: DISCONTINUED | OUTPATIENT
Start: 2021-08-24 | End: 2021-08-27 | Stop reason: HOSPADM

## 2021-08-24 RX ORDER — LORAZEPAM 2 MG/ML
1 INJECTION INTRAMUSCULAR EVERY 6 HOURS PRN
Status: DISCONTINUED | OUTPATIENT
Start: 2021-08-24 | End: 2021-08-27 | Stop reason: HOSPADM

## 2021-08-24 RX ORDER — SODIUM CHLORIDE 9 MG/ML
INJECTION, SOLUTION INTRAVENOUS PRN
Status: DISCONTINUED | OUTPATIENT
Start: 2021-08-24 | End: 2021-08-27 | Stop reason: HOSPADM

## 2021-08-24 RX ORDER — SODIUM CHLORIDE 9 MG/ML
INJECTION, SOLUTION INTRAVENOUS CONTINUOUS
Status: DISCONTINUED | OUTPATIENT
Start: 2021-08-24 | End: 2021-08-27 | Stop reason: HOSPADM

## 2021-08-24 RX ORDER — LEVOTHYROXINE SODIUM 0.1 MG/1
100 TABLET ORAL DAILY
Status: DISCONTINUED | OUTPATIENT
Start: 2021-08-24 | End: 2021-08-27 | Stop reason: HOSPADM

## 2021-08-24 RX ORDER — CLOPIDOGREL BISULFATE 75 MG/1
75 TABLET ORAL DAILY
Status: DISCONTINUED | OUTPATIENT
Start: 2021-08-25 | End: 2021-08-27 | Stop reason: HOSPADM

## 2021-08-24 RX ORDER — SODIUM CHLORIDE 0.9 % (FLUSH) 0.9 %
5-40 SYRINGE (ML) INJECTION PRN
Status: DISCONTINUED | OUTPATIENT
Start: 2021-08-24 | End: 2021-08-27 | Stop reason: HOSPADM

## 2021-08-24 RX ORDER — CARVEDILOL 3.12 MG/1
3.12 TABLET ORAL 2 TIMES DAILY WITH MEALS
Status: DISCONTINUED | OUTPATIENT
Start: 2021-08-24 | End: 2021-08-27

## 2021-08-24 RX ORDER — MELOXICAM 7.5 MG/1
7.5 TABLET ORAL DAILY
Status: DISCONTINUED | OUTPATIENT
Start: 2021-08-24 | End: 2021-08-27 | Stop reason: HOSPADM

## 2021-08-24 RX ORDER — PHENYLEPHRINE HCL IN 0.9% NACL 1 MG/10 ML
SYRINGE (ML) INTRAVENOUS PRN
Status: DISCONTINUED | OUTPATIENT
Start: 2021-08-24 | End: 2021-08-24 | Stop reason: SDUPTHER

## 2021-08-24 RX ORDER — AMIODARONE HYDROCHLORIDE 50 MG/ML
INJECTION, SOLUTION INTRAVENOUS PRN
Status: DISCONTINUED | OUTPATIENT
Start: 2021-08-24 | End: 2021-08-24 | Stop reason: SDUPTHER

## 2021-08-24 RX ORDER — LIDOCAINE HYDROCHLORIDE 10 MG/ML
INJECTION, SOLUTION INFILTRATION; PERINEURAL PRN
Status: DISCONTINUED | OUTPATIENT
Start: 2021-08-24 | End: 2021-08-24 | Stop reason: SDUPTHER

## 2021-08-24 RX ORDER — ATORVASTATIN CALCIUM 20 MG/1
20 TABLET, FILM COATED ORAL NIGHTLY
Status: DISCONTINUED | OUTPATIENT
Start: 2021-08-24 | End: 2021-08-27 | Stop reason: HOSPADM

## 2021-08-24 RX ORDER — FENTANYL CITRATE 50 UG/ML
INJECTION, SOLUTION INTRAMUSCULAR; INTRAVENOUS PRN
Status: DISCONTINUED | OUTPATIENT
Start: 2021-08-24 | End: 2021-08-24 | Stop reason: SDUPTHER

## 2021-08-24 RX ORDER — SODIUM CHLORIDE 0.9 % (FLUSH) 0.9 %
5-40 SYRINGE (ML) INJECTION EVERY 12 HOURS SCHEDULED
Status: DISCONTINUED | OUTPATIENT
Start: 2021-08-24 | End: 2021-08-27 | Stop reason: HOSPADM

## 2021-08-24 RX ORDER — HYDROCODONE BITARTRATE AND ACETAMINOPHEN 5; 325 MG/1; MG/1
1 TABLET ORAL EVERY 4 HOURS PRN
Status: DISCONTINUED | OUTPATIENT
Start: 2021-08-24 | End: 2021-08-27 | Stop reason: HOSPADM

## 2021-08-24 RX ORDER — GAUZE BANDAGE 2" X 2"
100 BANDAGE TOPICAL DAILY
Status: DISCONTINUED | OUTPATIENT
Start: 2021-08-24 | End: 2021-08-27 | Stop reason: HOSPADM

## 2021-08-24 RX ORDER — MAGNESIUM CHLORIDE 200 MG/ML
INJECTION INTRAVENOUS PRN
Status: DISCONTINUED | OUTPATIENT
Start: 2021-08-24 | End: 2021-08-24 | Stop reason: SDUPTHER

## 2021-08-24 RX ORDER — HEPARIN SODIUM 1000 [USP'U]/ML
INJECTION, SOLUTION INTRAVENOUS; SUBCUTANEOUS PRN
Status: DISCONTINUED | OUTPATIENT
Start: 2021-08-24 | End: 2021-08-24 | Stop reason: SDUPTHER

## 2021-08-24 RX ORDER — FUROSEMIDE 20 MG/1
20 TABLET ORAL 2 TIMES DAILY
Status: DISCONTINUED | OUTPATIENT
Start: 2021-08-24 | End: 2021-08-27 | Stop reason: HOSPADM

## 2021-08-24 RX ORDER — ONDANSETRON 2 MG/ML
4 INJECTION INTRAMUSCULAR; INTRAVENOUS EVERY 6 HOURS PRN
Status: DISCONTINUED | OUTPATIENT
Start: 2021-08-24 | End: 2021-08-27 | Stop reason: HOSPADM

## 2021-08-24 RX ORDER — AMIODARONE HYDROCHLORIDE 50 MG/ML
300 INJECTION, SOLUTION INTRAVENOUS ONCE
Status: DISCONTINUED | OUTPATIENT
Start: 2021-08-24 | End: 2021-08-27 | Stop reason: HOSPADM

## 2021-08-24 RX ORDER — FOLIC ACID 1 MG/1
1 TABLET ORAL DAILY
Status: DISCONTINUED | OUTPATIENT
Start: 2021-08-24 | End: 2021-08-27 | Stop reason: HOSPADM

## 2021-08-24 RX ORDER — SODIUM CHLORIDE 9 MG/ML
25 INJECTION, SOLUTION INTRAVENOUS PRN
Status: DISCONTINUED | OUTPATIENT
Start: 2021-08-24 | End: 2021-08-27 | Stop reason: HOSPADM

## 2021-08-24 RX ORDER — PROTAMINE SULFATE 10 MG/ML
INJECTION, SOLUTION INTRAVENOUS PRN
Status: DISCONTINUED | OUTPATIENT
Start: 2021-08-24 | End: 2021-08-24 | Stop reason: SDUPTHER

## 2021-08-24 RX ORDER — MORPHINE SULFATE 2 MG/ML
2 INJECTION, SOLUTION INTRAMUSCULAR; INTRAVENOUS
Status: DISCONTINUED | OUTPATIENT
Start: 2021-08-24 | End: 2021-08-27 | Stop reason: HOSPADM

## 2021-08-24 RX ORDER — HYDROCODONE BITARTRATE AND ACETAMINOPHEN 5; 325 MG/1; MG/1
2 TABLET ORAL EVERY 4 HOURS PRN
Status: DISCONTINUED | OUTPATIENT
Start: 2021-08-24 | End: 2021-08-27 | Stop reason: HOSPADM

## 2021-08-24 RX ADMIN — PROTAMINE SULFATE 20 MG: 10 INJECTION, SOLUTION INTRAVENOUS at 13:36

## 2021-08-24 RX ADMIN — HYDROCODONE BITARTRATE AND ACETAMINOPHEN 2 TABLET: 5; 325 TABLET ORAL at 20:20

## 2021-08-24 RX ADMIN — FENTANYL CITRATE 25 MCG: 50 INJECTION, SOLUTION INTRAMUSCULAR; INTRAVENOUS at 12:19

## 2021-08-24 RX ADMIN — SODIUM CHLORIDE, PRESERVATIVE FREE 10 ML: 5 INJECTION INTRAVENOUS at 20:16

## 2021-08-24 RX ADMIN — FENTANYL CITRATE 50 MCG: 50 INJECTION, SOLUTION INTRAMUSCULAR; INTRAVENOUS at 12:15

## 2021-08-24 RX ADMIN — CLOPIDOGREL 300 MG: 300 TABLET, FILM COATED ORAL at 11:25

## 2021-08-24 RX ADMIN — FENTANYL CITRATE 25 MCG: 50 INJECTION, SOLUTION INTRAMUSCULAR; INTRAVENOUS at 13:45

## 2021-08-24 RX ADMIN — Medication 100 MCG: at 12:58

## 2021-08-24 RX ADMIN — Medication 100 MCG: at 12:28

## 2021-08-24 RX ADMIN — Medication 100 MCG: at 12:30

## 2021-08-24 RX ADMIN — HYDROCODONE BITARTRATE AND ACETAMINOPHEN 2 TABLET: 5; 325 TABLET ORAL at 15:29

## 2021-08-24 RX ADMIN — LORAZEPAM 1 MG: 2 INJECTION INTRAMUSCULAR; INTRAVENOUS at 20:20

## 2021-08-24 RX ADMIN — MIDAZOLAM HYDROCHLORIDE 2 MG: 1 INJECTION, SOLUTION INTRAMUSCULAR; INTRAVENOUS at 12:07

## 2021-08-24 RX ADMIN — HEPARIN SODIUM 5000 UNITS: 1000 INJECTION INTRAVENOUS; SUBCUTANEOUS at 12:56

## 2021-08-24 RX ADMIN — LORAZEPAM 1 MG: 2 INJECTION INTRAMUSCULAR; INTRAVENOUS at 15:48

## 2021-08-24 RX ADMIN — LIDOCAINE HYDROCHLORIDE 100 MG: 20 INJECTION INTRAVENOUS at 13:00

## 2021-08-24 RX ADMIN — HEPARIN SODIUM 5000 UNITS: 1000 INJECTION INTRAVENOUS; SUBCUTANEOUS at 12:50

## 2021-08-24 RX ADMIN — LIDOCAINE HYDROCHLORIDE 50 MG: 10 INJECTION, SOLUTION INFILTRATION; PERINEURAL at 12:15

## 2021-08-24 RX ADMIN — MAGNESIUM CHLORIDE 2000 MG: 200 INJECTION INTRAVENOUS at 13:03

## 2021-08-24 RX ADMIN — SODIUM CHLORIDE: 9 INJECTION, SOLUTION INTRAVENOUS at 14:27

## 2021-08-24 RX ADMIN — PHENYLEPHRINE HYDROCHLORIDE 25 MCG/MIN: 10 INJECTION INTRAVENOUS at 12:37

## 2021-08-24 RX ADMIN — SODIUM CHLORIDE: 9 INJECTION, SOLUTION INTRAVENOUS at 11:26

## 2021-08-24 RX ADMIN — AMIODARONE HYDROCHLORIDE 150 MG: 50 INJECTION, SOLUTION INTRAVENOUS at 13:07

## 2021-08-24 RX ADMIN — ASPIRIN 81 MG: 81 TABLET, CHEWABLE ORAL at 15:27

## 2021-08-24 RX ADMIN — AMIODARONE HYDROCHLORIDE 1 MG/MIN: 50 INJECTION, SOLUTION INTRAVENOUS at 15:39

## 2021-08-24 RX ADMIN — ATORVASTATIN CALCIUM 20 MG: 20 TABLET, FILM COATED ORAL at 20:16

## 2021-08-24 RX ADMIN — AMIODARONE HYDROCHLORIDE 1 MG/MIN: 150 INJECTION, SOLUTION INTRAVENOUS at 13:17

## 2021-08-24 RX ADMIN — PROPOFOL 100 MCG/KG/MIN: 10 INJECTION, EMULSION INTRAVENOUS at 12:15

## 2021-08-24 RX ADMIN — Medication 100 MCG: at 12:32

## 2021-08-24 RX ADMIN — CEFAZOLIN 2000 MG: 1 INJECTION, POWDER, FOR SOLUTION INTRAMUSCULAR; INTRAVENOUS at 12:20

## 2021-08-24 RX ADMIN — PROTAMINE SULFATE 20 MG: 10 INJECTION, SOLUTION INTRAVENOUS at 13:20

## 2021-08-24 RX ADMIN — Medication 200 MCG: at 13:20

## 2021-08-24 RX ADMIN — MORPHINE SULFATE 2 MG: 2 INJECTION, SOLUTION INTRAMUSCULAR; INTRAVENOUS at 14:46

## 2021-08-24 ASSESSMENT — PULMONARY FUNCTION TESTS
PIF_VALUE: 1
PIF_VALUE: 0
PIF_VALUE: 1
PIF_VALUE: 0
PIF_VALUE: 1
PIF_VALUE: 0
PIF_VALUE: 1
PIF_VALUE: 0
PIF_VALUE: 1
PIF_VALUE: 0
PIF_VALUE: 1
PIF_VALUE: 0
PIF_VALUE: 1
PIF_VALUE: 1
PIF_VALUE: 0
PIF_VALUE: 1

## 2021-08-24 ASSESSMENT — PAIN SCALES - GENERAL
PAINLEVEL_OUTOF10: 7
PAINLEVEL_OUTOF10: 7
PAINLEVEL_OUTOF10: 9
PAINLEVEL_OUTOF10: 8

## 2021-08-24 ASSESSMENT — ENCOUNTER SYMPTOMS: ALLERGIC/IMMUNOLOGIC NEGATIVE: 1

## 2021-08-24 ASSESSMENT — COPD QUESTIONNAIRES: CAT_SEVERITY: NO INTERVAL CHANGE

## 2021-08-24 NOTE — ANESTHESIA PRE PROCEDURE
tablet by mouth daily 30 tablet 3    thiamine mononitrate (THIAMINE) 100 MG tablet Take 1 tablet by mouth daily 30 tablet 3    HYDROcodone-acetaminophen (NORCO) 7.5-325 MG per tablet Take 1 tablet by mouth every 6 hours as needed for Pain.        Current Facility-Administered Medications   Medication Dose Route Frequency Provider Last Rate Last Admin    0.9 % sodium chloride infusion   Intravenous Continuous Jae Mate, APRN - CNP        0.9 % sodium chloride infusion   Intravenous PRN Jae Mate, APRN - CNP        clopidogrel (PLAVIX) tablet 300 mg  300 mg Oral Once Jae Mate, APRN - CNP        vancomycin (VANCOCIN) 1250 mg in dextrose 5 % 250 mL IVPB  1,250 mg Intravenous Q12H Jae Mate, APRN - CNP           Allergies:  No Known Allergies    Problem List:    Patient Active Problem List   Diagnosis Code    Sciatica M54.30    Low back pain M54.5    Gout M10.9    Other ankle sprain and strain T92.221M, Y57.071D    Other testicular hypofunction E29.1    Generalized osteoarthrosis, unspecified site M15.9    Enthesopathy M77.9    Hypothyroid E03.9    Aortic stenosis I35.0    Alcohol abuse F10.10    Polycythemia D75.1    Chronic combined systolic (congestive) and diastolic (congestive) heart failure (HCC) I50.42    Valvular heart disease I38    Family history of premature CAD Z80.55    Anxiety F41.9    Coronary artery disease involving native coronary artery without angina pectoris I25.10    Former smoker Z87.891    Stenosis of right carotid artery I65.21    Mediastinal lymphadenopathy R59.0    COPD (chronic obstructive pulmonary disease) ([facility] [address] J44.9       Past Medical History:        Diagnosis Date    Acute diastolic congestive heart failure (HCC)     Acute systolic congestive heart failure ([address] 07/22/2021    Alcohol abuse 07/12/2021    Anxiety     Chronic combined systolic (congestive) and diastolic (congestive) heart failure ([facility] [address] 07/19/2021    results found for: PHART, PO2ART, CDF6WPW, CIG4SAV, BEART, R7XDBLOH     Type & Screen (If Applicable):  No results found for: LABABO, LABRH    Drug/Infectious Status (If Applicable):  No results found for: HIV, HEPCAB    COVID-19 Screening (If Applicable): No results found for: COVID19        Anesthesia Evaluation  Patient summary reviewed no history of anesthetic complications:   Airway: Mallampati: II  TM distance: >3 FB   Neck ROM: full  Mouth opening: > = 3 FB Dental:          Pulmonary:normal exam    (+) COPD: no interval change,                             Cardiovascular:    (+) valvular problems/murmurs: AS, CAD: no interval change, CHF: no interval change, murmur,       ECG reviewed  Rhythm: regular  Rate: normal  Echocardiogram reviewed                  Neuro/Psych:   (+) neuromuscular disease:, psychiatric history:depression/anxiety             GI/Hepatic/Renal: Neg GI/Hepatic/Renal ROS            Endo/Other:    (+) hypothyroidism::., .                 Abdominal:             Vascular: negative vascular ROS. Other Findings: EF 20%  Severe AS          Anesthesia Plan      MAC     ASA 4       Induction: intravenous. arterial line    Anesthetic plan and risks discussed with patient. Use of blood products discussed with patient whom consented to blood products. Plan discussed with CRNA.                 Marce Solomon MD   8/24/2021

## 2021-08-24 NOTE — ANESTHESIA POSTPROCEDURE EVALUATION
Department of Anesthesiology  Postprocedure Note    Patient: Xenia Fernando  MRN: [id number]  YOB: 1960  Date of evaluation: 8/24/2021  Time:  4:41 PM     Procedure Summary     Date: 08/24/21 Room / Location: [facility] Cath Lab    Anesthesia Start: 7457 Anesthesia Stop: 6498    Procedure: TAVR W/ ANESTHESIA Diagnosis:       History of transcatheter aortic valve replacement (TAVR)      S/P TAVR (transcatheter aortic valve replacement)    Scheduled Providers: Tyshawn Mcwilliams MD Responsible Provider: Tyshawn Mcwilliams MD    Anesthesia Type: TIVA, General ASA Status: 4          Anesthesia Type: TIVA, General    Clyde Phase I:      Clyde Phase II:      Last vitals: Reviewed and per EMR flowsheets.        Anesthesia Post Evaluation    Patient location during evaluation: ICU  Patient participation: complete - patient participated  Level of consciousness: awake and alert  Pain score: 0  Airway patency: patent  Nausea & Vomiting: no vomiting and no nausea  Complications: no  Cardiovascular status: hemodynamically stable  Respiratory status: acceptable  Hydration status: stable

## 2021-08-24 NOTE — CARE COORDINATION
Case Management Initial Discharge Plan  Victor Hugo Lama,             Met with:patient to discuss discharge plans. Information verified: address, contacts, phone number, , insurance Yes  Insurance Provider: Olivia Blackman [facility]    Emergency Contact/Next of Kin name & number:   Who are involved in patient's support system? Son sister    PCP: Renny Pang MD  Date of last visit: 4 weeks      Discharge Planning    Living Arrangements:    lives with son    Home has 2 stories with laundry in basement  3 stairs to climb to get into front door, 14stairs to climb to reach second floor  Location of bedroom/bathroom in home bed up bath basement    Patient able to perform ADL's:Independent    Current Services (outpatient & in home) none  DME equipment: none  DME provider:     Is patient receiving oral anticoagulation therapy? Yes  sa 81mg    If indicated:   Physician managing anticoagulation treatment:   Where does patient obtain lab work for ATC treatment? Potential Assistance Needed:       Patient agreeable to home care: No  [place] of choice provided:  n/a    Prior SNF/Rehab Placement and Facility: none  Agreeable to SNF/Rehab: No  [place] of choice provided: n/a     Evaluation: no    Expected Discharge date:       Patient expects to be discharged to: If home: is the family and/or caregiver wiling & able to provide support at home? Son sister  Who will be providing this support? son*    Follow Up Appointment: Best Day/ Time:      Transportation provider: sister Jere Donahue arrangements needed for discharge: No    Readmission Risk              Risk of Unplanned Readmission:  9             Does patient have a readmission risk score greater than 14?: No  If yes, follow-up appointment must be made within 7 days of discharge.      Goals of Care: decrease pain      Educated pt and his sister on transitional options, provided freedom of choice and are agreeable with plan      Discharge

## 2021-08-24 NOTE — H&P
TAVR Consult & Documentation                Today's Date: 8/24/2021  Patient Name: Fabiola Giron  Date of admission: No admission date for patient encounter. Patient's age: 63 y.o., 1960  Admission Dx: No admission diagnoses are documented for this encounter. Reason for Consult:  Cardiac evaluation    Requesting Physician: No admitting provider for patient encounter. CHIEF COMPLAINT:  SOB, aortic stenosis    History Obtained From:  Patient and medical records    HISTORY OF PRESENT ILLNESS:      The patient is a 63 y.o.  male who is admitted to the hospital for TAVR    Known with:  1. CHF  2. AOrtic stenosis  3. PVD with carotid stenosis r-occluded  4. COPD  5. Hypothyroid  6. CHRIS  7. Anxiety  8. Cardiomyopathy with low EF    [facility]  Due to stenosis and insufficiency. Past Medical History:   has a past medical history of Acute diastolic congestive heart failure ([address] Acute systolic congestive heart failure ([address] Alcohol abuse, Anxiety, Chronic combined systolic (congestive) and diastolic (congestive) heart failure ([address] Hypothyroid, Osteoarthritis, Smoking, and Vasovagal reaction. Past Surgical History:   has no past surgical history on file. Home Medications:    Prior to Admission medications    Medication Sig Start Date End Date Taking?  Authorizing Provider   meloxicam (MOBIC) 7.5 MG tablet TAKE 1 TAB BY MOUTH ONCE A DAY  8/11/21   Daniele Lam MD   levothyroxine (SYNTHROID) 75 MCG tablet TAKE 1 TAB BY MOUTH ONCE A DAY  7/20/21   Daniele Lam MD   aspirin 81 MG chewable tablet Take 1 tablet by mouth daily 7/18/21   Loy Vital MD   atorvastatin (LIPITOR) 20 MG tablet Take 1 tablet by mouth nightly 7/17/21   Loy Vital MD   carvedilol (COREG) 3.125 MG tablet Take 1 tablet by mouth 2 times daily (with meals) 7/17/21   Loy Vital MD   furosemide (LASIX) 20 MG tablet Take 1 tablet by mouth 2 times daily 7/17/21   Taylor Rainey Dudley Contreras MD   folic acid (FOLVITE) 1 MG tablet Take 1 tablet by mouth daily 7/18/21   Brandie Sewell MD   thiamine mononitrate (THIAMINE) 100 MG tablet Take 1 tablet by mouth daily 7/18/21   Brandie Sewell MD   HYDROcodone-acetaminophen (NORCO) 7.5-325 MG per tablet Take 1 tablet by mouth every 6 hours as needed for Pain. Historical Provider, MD      Current Facility-Administered Medications: 0.9 % sodium chloride infusion, , IntraVENous, Continuous  0.9 % sodium chloride infusion, , IntraVENous, PRN  vancomycin (VANCOCIN) 1250 mg in dextrose 5 % 250 mL IVPB, 1,250 mg, IntraVENous, Q12H    Allergies:  Patient has no known allergies. Social History:   reports that he quit smoking about 6 weeks ago. His smoking use included cigarettes. He has a 30.00 pack-year smoking history. He has never used smokeless tobacco. He reports current alcohol use. He reports that he does not use drugs. Family History: family history includes Coronary Art Dis (age of onset: 27) in his father. No h/o sudden cardiac death. No for premature CAD    REVIEW OF SYSTEMS:    · Constitutional: there has been no unanticipated weight loss. There's been No change in energy level, No change in activity level. · Eyes: No visual changes or diplopia. No scleral icterus. · ENT: No Headaches, hearing loss or vertigo. No mouth sores or sore throat. · Cardiovascular: SOB, aortic stenosis  · Respiratory: COPD  · Gastrointestinal: No abdominal pain, appetite loss, blood in stools. No change in bowel or bladder habits. · Genitourinary: No dysuria, trouble voiding, or hematuria. · Musculoskeletal:  No gait disturbance, No weakness or joint complaints. · Integumentary: No rash or pruritis. · Neurological: No headache, diplopia, change in muscle strength, numbness or tingling. No change in gait, balance, coordination, mood, affect, memory, mentation, behavior. · Psychiatric: No anxiety, or depression. · Endocrine: No temperature intolerance.  No excessive thirst, fluid intake, or urination. No tremor. · Hematologic/Lymphatic: No abnormal bruising or bleeding, blood clots or swollen lymph nodes. · Allergic/Immunologic: No nasal congestion or hives. PHYSICAL EXAM:      There were no vitals taken for this visit. Constitutional and General Appearance: alert, cooperative, no distress and appears stated age  [de-identified]: PERRL, no cervical lymphadenopathy. No masses palpable. Normal oral mucosa  Respiratory:  · Normal excursion and expansion without use of accessory muscles  · Resp Auscultation: Good respiratory effort. No for increased work of breathing. On auscultation: clear to auscultation bilaterally  Cardiovascular:  · The apical impulse is not displaced  · Heart tones are crisp and normal. regular S1 and S2. DANIELLE  · Jugular venous pulsation Normal  · The carotid upstroke is normal in amplitude and contour without delay or bruit  · Peripheral pulses are symmetrical and full   Abdomen:   · No masses or tenderness  · Bowel sounds present  Extremities:  ·  No Cyanosis or Clubbing  ·  Lower extremity edema: No  ·  Skin: Warm and dry  Neurological:  · Alert and oriented. · Moves all extremities well  · No abnormalities of mood, affect, memory, mentation, or behavior are noted    DATA Needed to complete full TAVR evaluation:    EKG:    Sinus rhythm with IVCD and T wave inversion    ECHO:  Summary  Left ventricle is enlarged. Global left ventricular systolic function is  severely reduced. Estimated ejection fraction is 20 % . Calculated EF via 3D Heart Model is 22 %. Moderate left ventricular hypertrophy. Severe global hypokinesis. Grade III (severe) left ventricular diastolic dysfunction. Left atrium is severely dilated. Right atrial dilatation. The aortic valve is severely calcified with restricted mobility. The aortic  valve appears possibly bicuspid but difficult to determine. Severe (possibly  critical) aortic stenosis is noted.  Velocities may be under estimated due to  reduced systolic function. Mean gradient of 43 mmHg is noted. Dimensionless index 0.08, CHERYL 0.33 cm2. Mild aortic insufficiency. Thickened mitral valve leaflets. Mild mitral regurgitation. Mild tricuspid regurgitation. Estimated right ventricular systolic pressure  is 36 mmHg. Trivial pulmonic insufficiency.     Signature  ----------------------------------------------------------------------------   Electronically signed by Shanell Buckley(Sonographer) on 07/12/2021   05:27 PM  ----------------------------------------------------------------------------     ----------------------------------------------------------------------------   Electronically signed by Parish Kay(Interpreting physician) on 07/13/2021   12:10 PM    Carotid scan: As documented with significant disease. Cardiac Angiography:.  Date:7/12/21  Findings:  Minimal CAD, LV dysfunction, EF 25%  AS with mean gradient 79 mm Hg    CTA Scan:  Date:  Findings:    Summary of reviews  Annulus Mean Diameter: 33.8 Area: 33.4    LVOT Mean Diameter: 34.9 Area: 34.6    Ascending Aorta Max Diameter: 37.6     Sinotubular Junction MIN Diameter: 33.7 MAX Diameter: 35.1    Sinus of Valsava Diameter LCC: 38.1 RCC: 34.1 NCC: 36.8   Sinus of Vasava Height LCC: 30.5 RCC: 22.3 NCC: 25.3   Coronary [place] Height Left: 19.5 Right: 18.3        PFT:  Documented in chart:    STS Score:0.93%  Mortality / Morbidity: 9.83%        Labs:     CBC: No results for input(s): WBC, HGB, HCT, PLT in the last 72 hours. BMP:   Recent Labs     08/21/21  0920      K 4.1   CO2 25   BUN 30*   CREATININE 0.91   LABGLOM >60   GLUCOSE 94     BNP: No results for input(s): BNP in the last 72 hours. PT/INR: No results for input(s): PROTIME, INR in the last 72 hours. APTT:No results for input(s): APTT in the last 72 hours. CARDIAC ENZYMES:No results for input(s): CKTOTAL, CKMB, CKMBINDEX, TROPONINI in the last 72 hours.   FASTING LIPID PANEL:  Lab Results Component Value Date    HDL 36 07/13/2021    LDLCALC 90 09/17/2019    TRIG 74 07/13/2021     LIVER PROFILE:No results for input(s): AST, ALT, LABALBU in the last 72 hours. IMPRESSION:      1. Aortic stenosis, severe   2. Aortiv insufficiency  3. Cardiomyopathy, EF 25%. 4. COPD  5. Hypothroid      Patient Active Problem List   Diagnosis    Sciatica    Low back pain    Gout    Other ankle sprain and strain    Other testicular hypofunction    Generalized osteoarthrosis, unspecified site    Enthesopathy    Hypothyroid    Aortic stenosis    Alcohol abuse    Polycythemia    Chronic combined systolic (congestive) and diastolic (congestive) heart failure (HCC)    Valvular heart disease    Family history of premature CAD    Anxiety    Coronary artery disease involving native coronary artery without angina pectoris    Former smoker    Stenosis of right carotid artery    Mediastinal lymphadenopathy    COPD (chronic obstructive pulmonary disease) ([address]       RECOMMENDATIONS:    1. TAVr  2. Temporary pacer  3. Furher order after procedure. 4.     Discussed with patient and Nurse.     Electronically signed by Daniel Del Rio MD on 8/24/2021 at 6:52 AM  [facility]           [phone]

## 2021-08-24 NOTE — PROGRESS NOTES
Patient admitted, consent signed, all questions answered. Pt ready for procedure. Bed in low position, call light to reach with side rails up 2 of 2.   rob groins clipped. family at bedside with patient.

## 2021-08-24 NOTE — OP NOTE
[facility]  TAVR Procedure Note                 Procedure Date:   8/24/2021  Patient name:  Refugio Otoole  Date of admission:  No admission date for patient encounter. MRN:   [id number]  YOB: 1960    Reason for Admission:  TAVR    ASA Class:    [] I [] II [] III [x] IV    Mallampati Class:  [] I [] II [x] III [] IV    CHF NYHA Classification / Stages:    III-IV    KCCQ-12:    40      PREOPERATIVE DIAGNOSIS:    1. Severe aortic stenosis. 2. Advance Age. No (60)  3. Frailty. Mild  4. Comorbidity factors: As below      POSTOPERATIVE DIAGNOSIS:    1. Same      PROCEDURE PERFORMED:       1. Transcatheter aortic valve replacement using a 34 mm CoreValve via  right Femoral approach. Serial # [id number]  2. Closure devices for both common femoral arteries  3. Selective angiography []Femoral []Iliac  4. Multiple aortic root injections  5. Temporary venous pacemaker placement via left Femoral vein. Operators:    Interventional Cardiologist:  [x] Margie Patino M.D. [] Don Sanchez MD [] Shiraz aGrcia MD   [] NIALL Upton M.D    CV Surgeon:       [x] CHASE Felix MD [] KJ Mckeon M.D    Anesthesia:     [] General [x] MAC    History Obtained From:  Patient and medical records    HISTORY OF PRESENT ILLNESS:      The patient Refugio Otoole is a 63years old who had been thoroughly evaluated by the TAVR team. Known with severe symptomatic aortic stenosis and was deemed to be high risk by 1 surgeons. Patient was referred for transcatheter aortic valve replacement. Past Medical History:   has a past medical history of Acute diastolic congestive heart failure ([address] Acute systolic congestive heart failure ([address] Alcohol abuse, Anxiety, Chronic combined systolic (congestive) and diastolic (congestive) heart failure ([address] Hypothyroid, Osteoarthritis, Smoking, and Vasovagal reaction. Past Surgical History:   has no past surgical history on file.         DESCRIPTION OF PROCEDURE:    After a sterile prep and drape and administration of appropriate preoperative prophylactic antibiotics, the patient anterior chest, abdomen and legs were maticulously prepped and draped in a sterile manner. A time - out procedure was completed. Access:  [x] Femoral  [] Subclavian   [] LV Apical     [x] Right  [x] Left      Arterial cannulation in both femoral arteries and veins were obtained. Fluoroscopy was utilized. A pigtail catheter was then positioned over the noncoronary cusp of the aortic valve. The patient then was fully heparinized, the right common femoral artery access was used to advance 18 Serbian sheath over the stiff wire. A  Temporary pacemaker was inserted:  [x] Yes  [] No    If yes, insertion side:  [] Right [x] Left     Left in place:   [] Yes  [] No     The AL1 catheter was advanced through the sheath to cross the aortic valve and measure the transvalvular gradient, which was about 77 mmHg. Having completed this, we then put the [facility] MINNE wire in the left ventricle,     Balloon valvular plasty was done:    [x] Yes  [] No   [] Prior to TAVR deployment  [x] Post TAVR deployment    If it was done, it was completed using standard  balloon. Size as reported    After that we brought the 34 mm CoreValve device up onto the field and with myself in position 1 and Dr. Vaishali Mckeon in position 2, we delopoyed the valve across the native annulus without difficulty. Post - deployment, we were quite pleased with both the depth and the absence of significant perivalvular leak buy subsequent imaging and  [] JENIFFER or [x] TTE    Final hemodynamic measurements crossing the valve easily suggest mean gradient of: 7    Arterial access was then examined, and closed successfully, with a closure device,  [x] Proglide  []  Angio-Seal  [x] Mynx  It was done with no complication. The protamine was then administered, and the wound were closed in layers with Vicryl suture. The patient tolerated the procedure well.     Estimated Blood Loss:  [] Minimal 10-25 cc [x] Minimal < 25 cc [] Moderate 25-50 cc  [] >50 cc    IMPRESSION:    1. Successful Transcatheter Artic valve replacement  2. Successful temporary pacer implant: Still in  3. Successful closure all access with no complications. RECOMMENDATIONS:  1. Post TAVR protocol  2. Temporary pacer management if stayed in place. 3. TTE in 24 hours. Discussed with patient family and nursing.     Electronically signed by Lion Flores MD on 8/24/2021 at 9:02 AM.  [facility]

## 2021-08-25 LAB
ABO/RH: NORMAL
ANION GAP SERPL CALCULATED.3IONS-SCNC: 10 MMOL/L (ref 9–17)
ANTIBODY SCREEN: NEGATIVE
ARM BAND NUMBER: NORMAL
BLD PROD TYP BPU: NORMAL
BUN BLDV-MCNC: 23 MG/DL (ref 8–23)
BUN/CREAT BLD: NORMAL (ref 9–20)
CALCIUM SERPL-MCNC: 8.8 MG/DL (ref 8.6–10.4)
CHLORIDE BLD-SCNC: 105 MMOL/L (ref 98–107)
CO2: 20 MMOL/L (ref 20–31)
CREAT SERPL-MCNC: 0.89 MG/DL (ref 0.7–1.2)
CROSSMATCH RESULT: NORMAL
DISPENSE STATUS BLOOD BANK: NORMAL
EKG ATRIAL RATE: 48 BPM
EKG ATRIAL RATE: 64 BPM
EKG P AXIS: 6 DEGREES
EKG P AXIS: 64 DEGREES
EKG P-R INTERVAL: 230 MS
EKG P-R INTERVAL: 242 MS
EKG Q-T INTERVAL: 500 MS
EKG Q-T INTERVAL: 590 MS
EKG QRS DURATION: 134 MS
EKG QRS DURATION: 162 MS
EKG QTC CALCULATION (BAZETT): 515 MS
EKG QTC CALCULATION (BAZETT): 527 MS
EKG R AXIS: -29 DEGREES
EKG R AXIS: 66 DEGREES
EKG T AXIS: 139 DEGREES
EKG T AXIS: 151 DEGREES
EKG VENTRICULAR RATE: 48 BPM
EKG VENTRICULAR RATE: 64 BPM
EXPIRATION DATE: NORMAL
GFR AFRICAN AMERICAN: >60 ML/MIN
GFR NON-AFRICAN AMERICAN: >60 ML/MIN
GFR SERPL CREATININE-BSD FRML MDRD: NORMAL ML/MIN/{1.73_M2}
GFR SERPL CREATININE-BSD FRML MDRD: NORMAL ML/MIN/{1.73_M2}
GLUCOSE BLD-MCNC: 97 MG/DL (ref 70–99)
HCT VFR BLD CALC: 38.9 % (ref 40.7–50.3)
HEMOGLOBIN: 12.8 G/DL (ref 13–17)
LV EF: 40 %
LVEF MODALITY: NORMAL
MAGNESIUM: 2.6 MG/DL (ref 1.6–2.6)
MCH RBC QN AUTO: 31.9 PG (ref 25.2–33.5)
MCHC RBC AUTO-ENTMCNC: 32.9 G/DL (ref 28.4–34.8)
MCV RBC AUTO: 97 FL (ref 82.6–102.9)
NRBC AUTOMATED: 0 PER 100 WBC
PDW BLD-RTO: 13.9 % (ref 11.8–14.4)
PLATELET # BLD: 152 K/UL (ref 138–453)
PMV BLD AUTO: 10.9 FL (ref 8.1–13.5)
POTASSIUM SERPL-SCNC: 4.8 MMOL/L (ref 3.7–5.3)
RBC # BLD: 4.01 M/UL (ref 4.21–5.77)
SODIUM BLD-SCNC: 135 MMOL/L (ref 135–144)
TRANSFUSION STATUS: NORMAL
TROPONIN INTERP: ABNORMAL
TROPONIN T: ABNORMAL NG/ML
TROPONIN, HIGH SENSITIVITY: 129 NG/L (ref 0–22)
UNIT DIVISION: 0
UNIT NUMBER: NORMAL
WBC # BLD: 6.4 K/UL (ref 3.5–11.3)

## 2021-08-25 PROCEDURE — 6360000002 HC RX W HCPCS: Performed by: NURSE PRACTITIONER

## 2021-08-25 PROCEDURE — 83735 ASSAY OF MAGNESIUM: CPT

## 2021-08-25 PROCEDURE — 93306 TTE W/DOPPLER COMPLETE: CPT

## 2021-08-25 PROCEDURE — 93010 ELECTROCARDIOGRAM REPORT: CPT | Performed by: INTERNAL MEDICINE

## 2021-08-25 PROCEDURE — 84484 ASSAY OF TROPONIN QUANT: CPT

## 2021-08-25 PROCEDURE — 85027 COMPLETE CBC AUTOMATED: CPT

## 2021-08-25 PROCEDURE — 6370000000 HC RX 637 (ALT 250 FOR IP): Performed by: STUDENT IN AN ORGANIZED HEALTH CARE EDUCATION/TRAINING PROGRAM

## 2021-08-25 PROCEDURE — 6360000002 HC RX W HCPCS: Performed by: STUDENT IN AN ORGANIZED HEALTH CARE EDUCATION/TRAINING PROGRAM

## 2021-08-25 PROCEDURE — 93005 ELECTROCARDIOGRAM TRACING: CPT | Performed by: INTERNAL MEDICINE

## 2021-08-25 PROCEDURE — 2000000000 HC ICU R&B

## 2021-08-25 PROCEDURE — 6370000000 HC RX 637 (ALT 250 FOR IP): Performed by: INTERNAL MEDICINE

## 2021-08-25 PROCEDURE — 2580000003 HC RX 258: Performed by: NURSE PRACTITIONER

## 2021-08-25 PROCEDURE — 2580000003 HC RX 258: Performed by: INTERNAL MEDICINE

## 2021-08-25 PROCEDURE — 80048 BASIC METABOLIC PNL TOTAL CA: CPT

## 2021-08-25 RX ADMIN — FUROSEMIDE 20 MG: 20 TABLET ORAL at 17:09

## 2021-08-25 RX ADMIN — HYDROCODONE BITARTRATE AND ACETAMINOPHEN 2 TABLET: 5; 325 TABLET ORAL at 12:24

## 2021-08-25 RX ADMIN — FOLIC ACID 1 MG: 1 TABLET ORAL at 08:23

## 2021-08-25 RX ADMIN — VANCOMYCIN HYDROCHLORIDE 1250 MG: 10 INJECTION, POWDER, LYOPHILIZED, FOR SOLUTION INTRAVENOUS at 00:11

## 2021-08-25 RX ADMIN — HYDROCODONE BITARTRATE AND ACETAMINOPHEN 1 TABLET: 5; 325 TABLET ORAL at 08:24

## 2021-08-25 RX ADMIN — Medication 100 MG: at 08:23

## 2021-08-25 RX ADMIN — LEVOTHYROXINE SODIUM 100 MCG: 100 TABLET ORAL at 08:23

## 2021-08-25 RX ADMIN — ASPIRIN 81 MG: 81 TABLET, CHEWABLE ORAL at 08:23

## 2021-08-25 RX ADMIN — MELOXICAM 7.5 MG: 7.5 TABLET ORAL at 08:24

## 2021-08-25 RX ADMIN — SODIUM CHLORIDE, PRESERVATIVE FREE 10 ML: 5 INJECTION INTRAVENOUS at 08:26

## 2021-08-25 RX ADMIN — CLOPIDOGREL 75 MG: 75 TABLET, FILM COATED ORAL at 08:23

## 2021-08-25 RX ADMIN — SODIUM CHLORIDE, PRESERVATIVE FREE 10 ML: 5 INJECTION INTRAVENOUS at 20:00

## 2021-08-25 RX ADMIN — FUROSEMIDE 20 MG: 20 TABLET ORAL at 08:23

## 2021-08-25 RX ADMIN — LORAZEPAM 1 MG: 2 INJECTION INTRAMUSCULAR; INTRAVENOUS at 04:04

## 2021-08-25 RX ADMIN — ATORVASTATIN CALCIUM 20 MG: 20 TABLET, FILM COATED ORAL at 19:20

## 2021-08-25 RX ADMIN — HYDROCODONE BITARTRATE AND ACETAMINOPHEN 2 TABLET: 5; 325 TABLET ORAL at 19:20

## 2021-08-25 ASSESSMENT — PAIN - FUNCTIONAL ASSESSMENT: PAIN_FUNCTIONAL_ASSESSMENT: ACTIVITIES ARE NOT PREVENTED

## 2021-08-25 ASSESSMENT — PAIN SCALES - GENERAL
PAINLEVEL_OUTOF10: 0
PAINLEVEL_OUTOF10: 8
PAINLEVEL_OUTOF10: 3
PAINLEVEL_OUTOF10: 3
PAINLEVEL_OUTOF10: 6
PAINLEVEL_OUTOF10: 9
PAINLEVEL_OUTOF10: 2

## 2021-08-25 ASSESSMENT — PAIN DESCRIPTION - PROGRESSION: CLINICAL_PROGRESSION: NOT CHANGED

## 2021-08-25 ASSESSMENT — PAIN DESCRIPTION - ORIENTATION: ORIENTATION: LEFT

## 2021-08-25 ASSESSMENT — PAIN DESCRIPTION - DESCRIPTORS: DESCRIPTORS: SORE;ACHING

## 2021-08-25 ASSESSMENT — PAIN DESCRIPTION - PAIN TYPE: TYPE: CHRONIC PAIN

## 2021-08-25 ASSESSMENT — PAIN DESCRIPTION - ONSET: ONSET: ON-GOING

## 2021-08-25 ASSESSMENT — PAIN DESCRIPTION - LOCATION: LOCATION: HIP

## 2021-08-25 ASSESSMENT — PAIN DESCRIPTION - FREQUENCY: FREQUENCY: CONTINUOUS

## 2021-08-25 NOTE — PROGRESS NOTES
Dr. Addison Ernandez notified of patients elevated troponin. No new orders at this time.  Will continue to monitor

## 2021-08-25 NOTE — PLAN OF CARE
Problem: Anxiety:  Goal: Level of anxiety will decrease  Description: Level of anxiety will decrease  Outcome: Ongoing     Problem: Cardiac Output - Decreased:  Goal: Hemodynamic stability will improve  Description: Hemodynamic stability will improve  Outcome: Ongoing     Problem: Pain:  Goal: Pain level will decrease  Description: Pain level will decrease  Outcome: Met This Shift  Goal: Control of acute pain  Description: Control of acute pain  Outcome: Met This Shift  Goal: Control of chronic pain  Description: Control of chronic pain  Outcome: Met This Shift

## 2021-08-25 NOTE — PROGRESS NOTES
[facility]   Progress Note                    Date:   8/25/2021  Patient name:  Shayy Charles  Date of admission:  8/24/2021  2:12 PM  MRN:   [id number]  YOB: 1960  PCP:    Norma Arvizu MD    Reason for Admission:  History of transcatheter aortic valve replacement (TAVR) [Z95.2]  S/P TAVR (transcatheter aortic valve replacement) [Z95.2]    Subjective:   Clinical Changes / Abnormalities:    Patient seen and examined at bedside. Had 2 runs of V fib during TAVR yesterday, requiring shocks. Started on amiodarone gtt after 300 mg bolus in cath lab. No chest pain or shortness of breath. Under TAVR yesterday. Groin examined no oozing, bleeding or hematoma noted. Required pacing overnight pacing rate was set to 50. Decreased to 40 this AM by writer. Telemetry shows SR with HR of 48-49. Will get 12 lead EKG to confirm.     Urine output in the last 24 hours:     Intake/Output Summary (Last 24 hours) at 8/25/2021 0658  Last data filed at 8/25/2021 7377  Gross per 24 hour   Intake 799 ml   Output 1150 ml   Net -351 ml     I/O since admission: -351 cc  Medications:   Scheduled Meds:   vancomycin  1,250 mg IntraVENous Q12H    aspirin  81 mg Oral Daily    atorvastatin  20 mg Oral Nightly    carvedilol  3.125 mg Oral BID WC    folic acid  1 mg Oral Daily    furosemide  20 mg Oral BID    levothyroxine  100 mcg Oral Daily    meloxicam  7.5 mg Oral Daily    vitamin B-1  100 mg Oral Daily    sodium chloride flush  5-40 mL IntraVENous 2 times per day    clopidogrel  75 mg Oral Daily    amiodarone  300 mg IntraVENous Once     Continuous Infusions:   sodium chloride 75 mL/hr at 08/24/21 1427    sodium chloride      sodium chloride      sodium chloride      amiodarone 0.5 mg/min (08/24/21 2140)     CBC:   Recent Labs     08/24/21  1125 08/25/21  0318   WBC 5.3 6.4   HGB 13.7 12.8*    152     BMP:    Recent Labs     08/24/21  1118 08/24/21  1125 08/25/21  0318   NA  --  139 135   K  --  4.4 4.8   CL  --  106 105   CO2  --  25 20   BUN  --  29* 23   CREATININE 0.76 0.81 0.89   GLUCOSE  --  97 97     Hepatic:   Recent Labs     08/24/21  1125   BILITOT 0.80     Troponin: No results for input(s): TROPONINI in the last 72 hours. Recent Labs     08/24/21  1502 08/25/21  0318   TROPONINT NOT REPORTED NOT REPORTED     BNP:   Recent Labs     08/24/21  1125   PROBNP 3,464*      No results for input(s): BNP in the last 72 hours. Lipids: No results for input(s): CHOL, HDL in the last 72 hours. Invalid input(s): LDLCALCU  INR:   Recent Labs     08/24/21  1125   INR 1.1       Objective:   Vitals: BP (!) 90/43   Pulse 50   Temp 98 °F (36.7 °C) (Oral)   Resp 18   Ht 5' 10\" (1.778 m)   Wt 165 lb (74.8 kg)   SpO2 100%   BMI 23.68 kg/m²    Last Recorded Weight:  [unfilled]    Constitutional and General Appearance:    alert, cooperative, no distress and appears stated age  Respiratory:  · No for increased work of breathing. · On auscultation: diminished breath sounds bilaterally  Cardiovascular:  · The apical impulse is not displaced  · Heart tones are crisp and normal. Regular S1 and S2. No murmurs. · Rt Groin examined no oozing, bleeding or hematoma noted. Lt Groin has pacing wires in place. No bleeding noted. Abdomen:   · No masses or tenderness  · Bowel sounds present  Extremities:  ·  No Cyanosis or Clubbing  ·  Lower extremity edema: No  ·  Skin: Warm and dry  Neurological:  · Alert and oriented. Diagnostic Studies:   EKG:    Sinus rhythm with IVCD and T wave inversion     ECHO:  Summary  Left ventricle is enlarged. Global left ventricular systolic function is  severely reduced. Estimated ejection fraction is 20 % . Calculated EF via 3D Heart Model is 22 %. Moderate left ventricular hypertrophy. Severe global hypokinesis. Grade III (severe) left ventricular diastolic dysfunction. Left atrium is severely dilated. Right atrial dilatation.   The aortic valve is severely calcified with restricted mobility. The aortic  valve appears possibly bicuspid but difficult to determine. Severe (possibly  critical) aortic stenosis is noted. Velocities may be under estimated due to  reduced systolic function. Mean gradient of 43 mmHg is noted. Dimensionless index 0.08, CHERYL 0.33 cm2. Mild aortic insufficiency. Thickened mitral valve leaflets. Mild mitral regurgitation. Mild tricuspid regurgitation. Estimated right ventricular systolic pressure  is 36 mmHg. Trivial pulmonic insufficiency.     Signature  ----------------------------------------------------------------------------   Electronically signed by Shanell Buckley(Sonographer) on 07/12/2021   05:27 PM  ----------------------------------------------------------------------------     ----------------------------------------------------------------------------   Electronically signed by Parish Kay(Interpreting physician) on 07/13/2021   12:10 PM     Carotid scan: As documented with significant disease.     Cardiac Angiography:.  Date:7/12/21  Findings:  Minimal CAD, LV dysfunction, EF 25%  AS with mean gradient 79 mm Hg     CTA Scan:  Date:  Findings:     Summary of reviews  Annulus Mean Diameter: 33.8 Area: 33.4     LVOT Mean Diameter: 34.9 Area: 34.6     Ascending Aorta Max Diameter: 37.6       Sinotubular Junction MIN Diameter: 33.7 MAX Diameter: 35.1     Sinus of Valsava Diameter LCC: 38.1 RCC: 34.1 NCC: 36.8   Sinus of Vasava Height LCC: 30.5 RCC: 22.3 NCC: 25.3   Coronary [place] Height Left: 19.5 Right: 18.3           PFT:  Documented in chart:     STS Score:0.93%  Mortality / Morbidity: 9.83%      Assessment / Acute Cardiac Problems:   1. Severe AS s/p TAVR on 8/24/21 with 34 mm CoreValve via Rt Fem approach  2. Temporary Pacing wires in place. Required pacing overnight rate was 50. This morning rate decreased to 40. Currently not requiring pacing. 3. NICM (EF 25%)  4. COPD  5. Hypothyrodism  6.  Chronic combined systolic and diastolic heart failure (EF 20%-25%, grade III DD)    Plan of Treatment:   1. Continue amiodarone gtt  2. Continue ASA & Plavix  3. Continue atorvasatin 20 mg daily  4. Continue lasix 20 mg PO BID  5. Post TAVR 2D ECHO pending. Follow up on final read  6. Will consider starting ACEi/ARB per GDMT if BP can tolerate in setting of NICM (EF 25%)  7. K>4, Mg>2    Gayathri Sanchez MD, MD  Fellow, [address]      Attending note,   Patient was seen and examined. Extubated. Heart rate is sinus and in the 40s to 50s. Will stop amiodarone. We will keep temporary pacemaker for today. Groins are soft and dry. Continue current medications. We will follow on echo.

## 2021-08-25 NOTE — PROGRESS NOTES
Dr Alexandre Gardner at bedside for medical evaluation and pacemaker turned down to 40 VVI. EKG optained. Pt in SB with 1 degree block.

## 2021-08-26 LAB
ANION GAP SERPL CALCULATED.3IONS-SCNC: 11 MMOL/L (ref 9–17)
BUN BLDV-MCNC: 17 MG/DL (ref 8–23)
BUN/CREAT BLD: ABNORMAL (ref 9–20)
CALCIUM SERPL-MCNC: 8.8 MG/DL (ref 8.6–10.4)
CHLORIDE BLD-SCNC: 105 MMOL/L (ref 98–107)
CO2: 22 MMOL/L (ref 20–31)
CREAT SERPL-MCNC: 0.8 MG/DL (ref 0.7–1.2)
GFR AFRICAN AMERICAN: >60 ML/MIN
GFR NON-AFRICAN AMERICAN: >60 ML/MIN
GFR SERPL CREATININE-BSD FRML MDRD: ABNORMAL ML/MIN/{1.73_M2}
GFR SERPL CREATININE-BSD FRML MDRD: ABNORMAL ML/MIN/{1.73_M2}
GLUCOSE BLD-MCNC: 100 MG/DL (ref 70–99)
HCT VFR BLD CALC: 36.7 % (ref 40.7–50.3)
HEMOGLOBIN: 12 G/DL (ref 13–17)
MAGNESIUM: 2.2 MG/DL (ref 1.6–2.6)
MCH RBC QN AUTO: 32 PG (ref 25.2–33.5)
MCHC RBC AUTO-ENTMCNC: 32.7 G/DL (ref 28.4–34.8)
MCV RBC AUTO: 97.9 FL (ref 82.6–102.9)
NRBC AUTOMATED: 0 PER 100 WBC
PDW BLD-RTO: 14.1 % (ref 11.8–14.4)
PLATELET # BLD: ABNORMAL K/UL (ref 138–453)
PLATELET, FLUORESCENCE: 130 K/UL (ref 138–453)
PLATELET, IMMATURE FRACTION: 7.4 % (ref 1.1–10.3)
PMV BLD AUTO: ABNORMAL FL (ref 8.1–13.5)
POTASSIUM SERPL-SCNC: 4.2 MMOL/L (ref 3.7–5.3)
RBC # BLD: 3.75 M/UL (ref 4.21–5.77)
SODIUM BLD-SCNC: 138 MMOL/L (ref 135–144)
WBC # BLD: 6.5 K/UL (ref 3.5–11.3)

## 2021-08-26 PROCEDURE — 2060000000 HC ICU INTERMEDIATE R&B

## 2021-08-26 PROCEDURE — 2580000003 HC RX 258: Performed by: INTERNAL MEDICINE

## 2021-08-26 PROCEDURE — 6370000000 HC RX 637 (ALT 250 FOR IP): Performed by: INTERNAL MEDICINE

## 2021-08-26 PROCEDURE — 85027 COMPLETE CBC AUTOMATED: CPT

## 2021-08-26 PROCEDURE — 6370000000 HC RX 637 (ALT 250 FOR IP): Performed by: STUDENT IN AN ORGANIZED HEALTH CARE EDUCATION/TRAINING PROGRAM

## 2021-08-26 PROCEDURE — 85055 RETICULATED PLATELET ASSAY: CPT

## 2021-08-26 PROCEDURE — 83735 ASSAY OF MAGNESIUM: CPT

## 2021-08-26 PROCEDURE — 80048 BASIC METABOLIC PNL TOTAL CA: CPT

## 2021-08-26 RX ORDER — SENNA AND DOCUSATE SODIUM 50; 8.6 MG/1; MG/1
1 TABLET, FILM COATED ORAL 2 TIMES DAILY PRN
Status: DISCONTINUED | OUTPATIENT
Start: 2021-08-26 | End: 2021-08-27 | Stop reason: HOSPADM

## 2021-08-26 RX ADMIN — FUROSEMIDE 20 MG: 20 TABLET ORAL at 08:11

## 2021-08-26 RX ADMIN — LEVOTHYROXINE SODIUM 100 MCG: 100 TABLET ORAL at 08:11

## 2021-08-26 RX ADMIN — HYDROCODONE BITARTRATE AND ACETAMINOPHEN 2 TABLET: 5; 325 TABLET ORAL at 04:25

## 2021-08-26 RX ADMIN — FUROSEMIDE 20 MG: 20 TABLET ORAL at 17:08

## 2021-08-26 RX ADMIN — DOCUSATE SODIUM 50MG AND SENNOSIDES 8.6MG 1 TABLET: 8.6; 5 TABLET, FILM COATED ORAL at 21:13

## 2021-08-26 RX ADMIN — SODIUM CHLORIDE, PRESERVATIVE FREE 10 ML: 5 INJECTION INTRAVENOUS at 20:25

## 2021-08-26 RX ADMIN — ASPIRIN 81 MG: 81 TABLET, CHEWABLE ORAL at 08:12

## 2021-08-26 RX ADMIN — MELOXICAM 7.5 MG: 7.5 TABLET ORAL at 08:11

## 2021-08-26 RX ADMIN — Medication 100 MG: at 08:11

## 2021-08-26 RX ADMIN — SODIUM CHLORIDE, PRESERVATIVE FREE 10 ML: 5 INJECTION INTRAVENOUS at 08:11

## 2021-08-26 RX ADMIN — HYDROCODONE BITARTRATE AND ACETAMINOPHEN 2 TABLET: 5; 325 TABLET ORAL at 11:08

## 2021-08-26 RX ADMIN — ATORVASTATIN CALCIUM 20 MG: 20 TABLET, FILM COATED ORAL at 20:25

## 2021-08-26 RX ADMIN — HYDROCODONE BITARTRATE AND ACETAMINOPHEN 2 TABLET: 5; 325 TABLET ORAL at 16:56

## 2021-08-26 RX ADMIN — CLOPIDOGREL 75 MG: 75 TABLET, FILM COATED ORAL at 08:12

## 2021-08-26 RX ADMIN — FOLIC ACID 1 MG: 1 TABLET ORAL at 08:11

## 2021-08-26 ASSESSMENT — PAIN DESCRIPTION - PAIN TYPE
TYPE: CHRONIC PAIN
TYPE: CHRONIC PAIN

## 2021-08-26 ASSESSMENT — PAIN SCALES - GENERAL
PAINLEVEL_OUTOF10: 0
PAINLEVEL_OUTOF10: 0
PAINLEVEL_OUTOF10: 8
PAINLEVEL_OUTOF10: 4
PAINLEVEL_OUTOF10: 0
PAINLEVEL_OUTOF10: 3
PAINLEVEL_OUTOF10: 8
PAINLEVEL_OUTOF10: 8
PAINLEVEL_OUTOF10: 9

## 2021-08-26 ASSESSMENT — PAIN DESCRIPTION - DESCRIPTORS
DESCRIPTORS: ACHING;SORE
DESCRIPTORS: ACHING;SORE

## 2021-08-26 ASSESSMENT — PAIN DESCRIPTION - FREQUENCY
FREQUENCY: CONTINUOUS
FREQUENCY: CONTINUOUS

## 2021-08-26 ASSESSMENT — PAIN DESCRIPTION - LOCATION
LOCATION: BACK;HIP
LOCATION: BACK;HIP

## 2021-08-26 ASSESSMENT — PAIN DESCRIPTION - ONSET
ONSET: ON-GOING
ONSET: ON-GOING

## 2021-08-26 ASSESSMENT — PAIN - FUNCTIONAL ASSESSMENT
PAIN_FUNCTIONAL_ASSESSMENT: ACTIVITIES ARE NOT PREVENTED
PAIN_FUNCTIONAL_ASSESSMENT: ACTIVITIES ARE NOT PREVENTED

## 2021-08-26 ASSESSMENT — PAIN DESCRIPTION - ORIENTATION
ORIENTATION: LEFT;LOWER;MID
ORIENTATION: LEFT;LOWER;MID

## 2021-08-26 NOTE — PROGRESS NOTES
Presented to bedside at 205 pm to remove patients temporary pacing wires. Pacing wires removed. Manual pressure held for 8 mins. No oozing, bleeding or hematoma noted. Vitals remain stable.      Claudeen Fuse, MD  Fellow Cardiovascular Disease  [facility]

## 2021-08-26 NOTE — PLAN OF CARE
Problem: Anxiety:  Goal: Level of anxiety will decrease  Description: Level of anxiety will decrease  Outcome: Ongoing     Problem: Cardiac Output - Decreased:  Goal: Hemodynamic stability will improve  Description: Hemodynamic stability will improve  Outcome: Ongoing     Problem: Fluid Volume - Imbalance:  Goal: Absence of imbalanced fluid volume signs and symptoms  Description: Absence of imbalanced fluid volume signs and symptoms  Outcome: Ongoing     Problem: Pain:  Goal: Pain level will decrease  Description: Pain level will decrease  Outcome: Ongoing  Goal: Control of acute pain  Description: Control of acute pain  Outcome: Ongoing  Goal: Control of chronic pain  Description: Control of chronic pain  Outcome: Ongoing     Problem: Skin Integrity:  Goal: Will show no infection signs and symptoms  Description: Will show no infection signs and symptoms  Outcome: Ongoing  Goal: Absence of new skin breakdown  Description: Absence of new skin breakdown  Outcome: Ongoing     Problem: Falls - Risk of:  Goal: Will remain free from falls  Description: Will remain free from falls  Outcome: Ongoing  Goal: Absence of physical injury  Description: Absence of physical injury  Outcome: Ongoing

## 2021-08-26 NOTE — PROGRESS NOTES
[facility]   Progress Note                    Date:   8/26/2021  Patient name:  Tracy Bowman  Date of admission:  8/24/2021  2:12 PM  MRN:   [id number]  YOB: 1960  PCP:    Eddie Saundres MD    Reason for Admission:  History of transcatheter aortic valve replacement (TAVR) [Z95.2]  S/P TAVR (transcatheter aortic valve replacement) [Z95.2]    Subjective:   Clinical Changes / Abnormalities:    Patient seen and examined at bedside. No acute events overnight. No chest pain or shortness of breath. Did not require any pacing since rate was decreased yesterday.     Urine output in the last 24 hours:     Intake/Output Summary (Last 24 hours) at 8/26/2021 0910  Last data filed at 8/26/2021 0400  Gross per 24 hour   Intake 600 ml   Output 1800 ml   Net -1200 ml     I/O since admission: -1.5 liters      Medications:   Scheduled Meds:   aspirin  81 mg Oral Daily    atorvastatin  20 mg Oral Nightly    [Held by provider] carvedilol  3.125 mg Oral BID WC    folic acid  1 mg Oral Daily    furosemide  20 mg Oral BID    levothyroxine  100 mcg Oral Daily    meloxicam  7.5 mg Oral Daily    vitamin B-1  100 mg Oral Daily    sodium chloride flush  5-40 mL IntraVENous 2 times per day    clopidogrel  75 mg Oral Daily    amiodarone  300 mg IntraVENous Once     Continuous Infusions:   sodium chloride 75 mL/hr at 08/24/21 1427    sodium chloride      sodium chloride      sodium chloride      amiodarone Stopped (08/25/21 1130)     CBC:   Recent Labs     08/24/21  1125 08/25/21  0318 08/26/21  0546   WBC 5.3 6.4 6.5   HGB 13.7 12.8* 12.0*    152 See Reflexed IPF Result     BMP:    Recent Labs     08/24/21  1125 08/25/21  0318 08/26/21  0546    135 138   K 4.4 4.8 4.2    105 105   CO2 25 20 22   BUN 29* 23 17   CREATININE 0.81 0.89 0.80   GLUCOSE 97 97 100*     Hepatic:   Recent Labs     08/24/21  1125   BILITOT 0.80     Troponin: No results for input(s): TROPONINI in the last 72 hours. Recent Labs     08/24/21  1502 08/25/21  0318   TROPONINT NOT REPORTED NOT REPORTED     BNP:   Recent Labs     08/24/21  1125   PROBNP 3,464*      No results for input(s): BNP in the last 72 hours. Lipids: No results for input(s): CHOL, HDL in the last 72 hours. Invalid input(s): LDLCALCU  INR:   Recent Labs     08/24/21  1125   INR 1.1       Objective:   Vitals: /66   Pulse (!) 47   Temp 98.4 °F (36.9 °C) (Oral)   Resp 15   Ht 5' 10\" (1.778 m)   Wt 165 lb (74.8 kg)   SpO2 98%   BMI 23.68 kg/m²    Constitutional and General Appearance:   · alert, cooperative, no distress and appears stated age  Respiratory:  · No for increased work of breathing. · On auscultation: diminished breath sounds bilaterally  Cardiovascular:  · The apical impulse is not displaced  · Heart tones are crisp and normal. Regular S1 and S2. No murmurs. · Rt Groin examined no oozing, bleeding or hematoma noted. Lt Groin has pacing wires in place. No bleeding noted. Abdomen:   · No masses or tenderness  · Bowel sounds present  Extremities:  ·  No Cyanosis or Clubbing  ·  Lower extremity edema: No  ·  Skin: Warm and dry  Neurological:  · Alert and oriented.     Diagnostic Studies:   EKG:    Sinus rhythm with IVCD and T wave inversion     ECHO:  Summary  Left ventricle is enlarged. Global left ventricular systolic function is  severely reduced. Estimated ejection fraction is 20 % . Calculated EF via 3D Heart Model is 22 %. Moderate left ventricular hypertrophy. Severe global hypokinesis. Grade III (severe) left ventricular diastolic dysfunction. Left atrium is severely dilated. Right atrial dilatation. The aortic valve is severely calcified with restricted mobility. The aortic  valve appears possibly bicuspid but difficult to determine. Severe (possibly  critical) aortic stenosis is noted. Velocities may be under estimated due to  reduced systolic function. Mean gradient of 43 mmHg is noted. Dimensionless index 0.08, CHERYL 0.33 cm2. Mild aortic insufficiency. Thickened mitral valve leaflets. Mild mitral regurgitation. Mild tricuspid regurgitation. Estimated right ventricular systolic pressure  is 36 mmHg. Trivial pulmonic insufficiency.     Signature  ----------------------------------------------------------------------------   Electronically signed by Shanell Buckley(Sonographer) on 07/12/2021   05:27 PM  ----------------------------------------------------------------------------     ----------------------------------------------------------------------------   Electronically signed by Parish Kay(Interpreting physician) on 07/13/2021   12:10 PM     Carotid scan: As documented with significant disease.     Cardiac Angiography:.  Date:7/12/21  Findings:  Minimal CAD, LV dysfunction, EF 25%  AS with mean gradient 79 mm Hg     CTA Scan:  Date:  Findings:     Summary of reviews  Annulus Mean Diameter: 33.8 Area: 33.4     LVOT Mean Diameter: 34.9 Area: 34.6     Ascending Aorta Max Diameter: 37.6       Sinotubular Junction MIN Diameter: 33.7 MAX Diameter: 35.1     Sinus of Valsava Diameter LCC: 38.1 RCC: 34.1 NCC: 36.8   Sinus of Vasava Height LCC: 30.5 RCC: 22.3 NCC: 25.3   Coronary [place] Height Left: 19.5 Right: 18.3           PFT:  Documented in chart:     STS Score:0.93%  Mortality / Morbidity: 9.83%        Assessment / Acute Cardiac Problems:   1. Severe AS s/p TAVR on 8/24/21 with 34 mm CoreValve via Rt Fem approach  2. Temporary Pacing wires in place. Required pacing overnight rate was 50. This morning rate decreased to 40. Currently not requiring pacing. 3. NICM (EF 25%)  4. COPD  5. Hypothyrodism  6. Chronic combined systolic and diastolic heart failure (EF 20%-25%, grade III DD)     Plan of Treatment:   1. Continue ASA & Plavix  2. Continue atorvasatin 20 mg daily  3. Continue lasix 20 mg PO BID  4.  Will consider starting ACEi/ARB per GDMT if BP can tolerate in setting of NICM (EF 25%)  5. K>4, Mg>2  6. In setting of EF being less than 35% will discuss LifeVest prior to discharge. Justyn Bryant MD  Fellow, [address]      Attending,   The patient was seen and examined agree with above. Feels good. Continue current medications. His heart rate has been 50-60 and is in normal sinus rhythm. We will continue current medications PT OT remove them pacemaker LifeVest as an outpatient. Follow-up with EF as an outpatient.

## 2021-08-27 VITALS
OXYGEN SATURATION: 94 % | WEIGHT: 165 LBS | SYSTOLIC BLOOD PRESSURE: 141 MMHG | BODY MASS INDEX: 23.62 KG/M2 | TEMPERATURE: 98.3 F | HEIGHT: 70 IN | DIASTOLIC BLOOD PRESSURE: 58 MMHG | RESPIRATION RATE: 16 BRPM | HEART RATE: 65 BPM

## 2021-08-27 LAB
ANION GAP SERPL CALCULATED.3IONS-SCNC: 11 MMOL/L (ref 9–17)
BUN BLDV-MCNC: 15 MG/DL (ref 8–23)
BUN/CREAT BLD: NORMAL (ref 9–20)
CALCIUM SERPL-MCNC: 9.5 MG/DL (ref 8.6–10.4)
CHLORIDE BLD-SCNC: 101 MMOL/L (ref 98–107)
CO2: 24 MMOL/L (ref 20–31)
CREAT SERPL-MCNC: 0.91 MG/DL (ref 0.7–1.2)
GFR AFRICAN AMERICAN: >60 ML/MIN
GFR NON-AFRICAN AMERICAN: >60 ML/MIN
GFR SERPL CREATININE-BSD FRML MDRD: NORMAL ML/MIN/{1.73_M2}
GFR SERPL CREATININE-BSD FRML MDRD: NORMAL ML/MIN/{1.73_M2}
GLUCOSE BLD-MCNC: 91 MG/DL (ref 70–99)
HCT VFR BLD CALC: 39.8 % (ref 40.7–50.3)
HEMOGLOBIN: 13.2 G/DL (ref 13–17)
MAGNESIUM: 2 MG/DL (ref 1.6–2.6)
MCH RBC QN AUTO: 32 PG (ref 25.2–33.5)
MCHC RBC AUTO-ENTMCNC: 33.2 G/DL (ref 28.4–34.8)
MCV RBC AUTO: 96.6 FL (ref 82.6–102.9)
NRBC AUTOMATED: 0 PER 100 WBC
PDW BLD-RTO: 13.9 % (ref 11.8–14.4)
PLATELET # BLD: ABNORMAL K/UL (ref 138–453)
PLATELET, FLUORESCENCE: 143 K/UL (ref 138–453)
PLATELET, IMMATURE FRACTION: 8 % (ref 1.1–10.3)
PMV BLD AUTO: ABNORMAL FL (ref 8.1–13.5)
POTASSIUM SERPL-SCNC: 4.2 MMOL/L (ref 3.7–5.3)
RBC # BLD: 4.12 M/UL (ref 4.21–5.77)
SODIUM BLD-SCNC: 136 MMOL/L (ref 135–144)
WBC # BLD: 7.6 K/UL (ref 3.5–11.3)

## 2021-08-27 PROCEDURE — 2580000003 HC RX 258: Performed by: INTERNAL MEDICINE

## 2021-08-27 PROCEDURE — 83735 ASSAY OF MAGNESIUM: CPT

## 2021-08-27 PROCEDURE — 80048 BASIC METABOLIC PNL TOTAL CA: CPT

## 2021-08-27 PROCEDURE — 6370000000 HC RX 637 (ALT 250 FOR IP): Performed by: STUDENT IN AN ORGANIZED HEALTH CARE EDUCATION/TRAINING PROGRAM

## 2021-08-27 PROCEDURE — 85055 RETICULATED PLATELET ASSAY: CPT

## 2021-08-27 PROCEDURE — 36415 COLL VENOUS BLD VENIPUNCTURE: CPT

## 2021-08-27 PROCEDURE — 6370000000 HC RX 637 (ALT 250 FOR IP): Performed by: INTERNAL MEDICINE

## 2021-08-27 PROCEDURE — 85027 COMPLETE CBC AUTOMATED: CPT

## 2021-08-27 RX ORDER — CARVEDILOL 3.12 MG/1
3.12 TABLET ORAL 2 TIMES DAILY WITH MEALS
Status: DISCONTINUED | OUTPATIENT
Start: 2021-08-27 | End: 2021-08-27 | Stop reason: HOSPADM

## 2021-08-27 RX ORDER — CLOPIDOGREL BISULFATE 75 MG/1
75 TABLET ORAL DAILY
Qty: 30 TABLET | Refills: 3 | Status: SHIPPED | OUTPATIENT
Start: 2021-08-28 | End: 2021-12-23 | Stop reason: SDUPTHER

## 2021-08-27 RX ORDER — LEVOTHYROXINE SODIUM 0.1 MG/1
100 TABLET ORAL DAILY
Qty: 30 TABLET | Refills: 3 | Status: SHIPPED | OUTPATIENT
Start: 2021-08-28 | End: 2021-12-23 | Stop reason: SDUPTHER

## 2021-08-27 RX ADMIN — LEVOTHYROXINE SODIUM 100 MCG: 100 TABLET ORAL at 08:22

## 2021-08-27 RX ADMIN — Medication 100 MG: at 08:22

## 2021-08-27 RX ADMIN — SODIUM CHLORIDE, PRESERVATIVE FREE 10 ML: 5 INJECTION INTRAVENOUS at 08:28

## 2021-08-27 RX ADMIN — CLOPIDOGREL 75 MG: 75 TABLET, FILM COATED ORAL at 08:22

## 2021-08-27 RX ADMIN — CARVEDILOL 3.12 MG: 3.12 TABLET, FILM COATED ORAL at 09:30

## 2021-08-27 RX ADMIN — FOLIC ACID 1 MG: 1 TABLET ORAL at 08:22

## 2021-08-27 RX ADMIN — MELOXICAM 7.5 MG: 7.5 TABLET ORAL at 08:22

## 2021-08-27 RX ADMIN — HYDROCODONE BITARTRATE AND ACETAMINOPHEN 2 TABLET: 5; 325 TABLET ORAL at 05:36

## 2021-08-27 RX ADMIN — ASPIRIN 81 MG: 81 TABLET, CHEWABLE ORAL at 08:22

## 2021-08-27 RX ADMIN — HYDROCODONE BITARTRATE AND ACETAMINOPHEN 2 TABLET: 5; 325 TABLET ORAL at 11:49

## 2021-08-27 RX ADMIN — FUROSEMIDE 20 MG: 20 TABLET ORAL at 08:22

## 2021-08-27 ASSESSMENT — PAIN SCALES - GENERAL
PAINLEVEL_OUTOF10: 7
PAINLEVEL_OUTOF10: 5
PAINLEVEL_OUTOF10: 7

## 2021-08-27 NOTE — PROGRESS NOTES
[facility]   Progress Note                    Date:   8/27/2021  Patient name:  Lalita Tadeo  Date of admission:  8/24/2021  2:12 PM  MRN:   [id number]  YOB: 1960  PCP:    Hugh Carreno MD    Reason for Admission:  History of transcatheter aortic valve replacement (TAVR) [Z95.2]  S/P TAVR (transcatheter aortic valve replacement) [Z95.2]    Subjective:   Clinical Changes / Abnormalities:    Patient seen and examined at bedside. No acute events overnight. No chest pain or shortness of breath. HR remains stable in upper 50's to lower 60's. Plan to restart coreg 3.125 mg BID this morning. If HR tolerates plan to discharge this afternoon.     Urine output in the last 24 hours:     Intake/Output Summary (Last 24 hours) at 8/27/2021 0910  Last data filed at 8/27/2021 0551  Gross per 24 hour   Intake 840 ml   Output 2800 ml   Net -1960 ml     I/O since admission: -3.5 liters    Medications:   Scheduled Meds:   carvedilol  3.125 mg Oral BID WC    aspirin  81 mg Oral Daily    atorvastatin  20 mg Oral Nightly    folic acid  1 mg Oral Daily    furosemide  20 mg Oral BID    levothyroxine  100 mcg Oral Daily    meloxicam  7.5 mg Oral Daily    vitamin B-1  100 mg Oral Daily    sodium chloride flush  5-40 mL IntraVENous 2 times per day    clopidogrel  75 mg Oral Daily    amiodarone  300 mg IntraVENous Once     Continuous Infusions:   sodium chloride 75 mL/hr at 08/24/21 1427    sodium chloride      sodium chloride      sodium chloride      amiodarone Stopped (08/25/21 1130)     CBC:   Recent Labs     08/25/21 0318 08/26/21  0546 08/27/21  0258   WBC 6.4 6.5 7.6   HGB 12.8* 12.0* 13.2    See Reflexed IPF Result See Reflexed IPF Result     BMP:    Recent Labs     08/25/21  0318 08/26/21  0546 08/27/21  0258    138 136   K 4.8 4.2 4.2    105 101   CO2 20 22 24   BUN 23 17 15   CREATININE 0.89 0.80 0.91   GLUCOSE 97 100* 91     Hepatic:   Recent Labs     08/24/21  1125 BILITOT 0.80     Troponin: No results for input(s): TROPONINI in the last 72 hours. Recent Labs     08/24/21  1502 08/25/21  0318   TROPONINT NOT REPORTED NOT REPORTED     BNP:   Recent Labs     08/24/21  1125   PROBNP 3,464*      No results for input(s): BNP in the last 72 hours. Lipids: No results for input(s): CHOL, HDL in the last 72 hours. Invalid input(s): LDLCALCU  INR:   Recent Labs     08/24/21  1125   INR 1.1       Objective:   Vitals: BP (!) 116/57   Pulse 56   Temp 98.1 °F (36.7 °C) (Oral)   Resp 15   Ht 5' 10\" (1.778 m)   Wt 165 lb (74.8 kg)   SpO2 94%   BMI 23.68 kg/m²    Constitutional and General Appearance:   alert, cooperative, no distress and appears stated age  Respiratory:  No for increased work of breathing. On auscultation: diminished breath sounds bilaterally  Cardiovascular: The apical impulse is not displaced  Heart tones are crisp and normal. Regular S1 and S2. No murmurs. Groins examined no oozing, bleeding or hematoma noted. Abdomen:   No masses or tenderness  Bowel sounds present  Extremities:   No Cyanosis or Clubbing   Lower extremity edema: No   Skin: Warm and dry  Neurological:  Alert and oriented. Diagnostic Studies:   EKG:    Sinus rhythm with IVCD and T wave inversion     ECHO:  Summary  Left ventricle is enlarged. Global left ventricular systolic function is  severely reduced. Estimated ejection fraction is 20 % . Calculated EF via 3D Heart Model is 22 %. Moderate left ventricular hypertrophy. Severe global hypokinesis. Grade III (severe) left ventricular diastolic dysfunction. Left atrium is severely dilated. Right atrial dilatation. The aortic valve is severely calcified with restricted mobility. The aortic  valve appears possibly bicuspid but difficult to determine. Severe (possibly  critical) aortic stenosis is noted. Velocities may be under estimated due to  reduced systolic function. Mean gradient of 43 mmHg is noted.  Dimensionless index 0.08, CHERYL 0.33 cm2. Mild aortic insufficiency. Thickened mitral valve leaflets. Mild mitral regurgitation. Mild tricuspid regurgitation. Estimated right ventricular systolic pressure  is 36 mmHg. Trivial pulmonic insufficiency. Signature  ----------------------------------------------------------------------------   Electronically signed by Shanell Buckley(Sonographer) on 07/12/2021   05:27 PM  ----------------------------------------------------------------------------     ----------------------------------------------------------------------------   Electronically signed by Parish Kay(Interpreting physician) on 07/13/2021   12:10 PM     Carotid scan: As documented with significant disease. Cardiac Angiography:.  Date:7/12/21  Findings:  Minimal CAD, LV dysfunction, EF 25%  AS with mean gradient 79 mm Hg     CTA Scan:  Date:  Findings:     Summary of reviews  Annulus Mean Diameter: 33.8 Area: 33.4     LVOT Mean Diameter: 34.9 Area: 34.6     Ascending Aorta Max Diameter: 37.6       Sinotubular Junction MIN Diameter: 33.7 MAX Diameter: 35.1     Sinus of Valsava Diameter LCC: 38.1 RCC: 34.1 NCC: 36.8   Sinus of Vasava Height LCC: 30.5 RCC: 22.3 NCC: 25.3   Coronary [place] Height Left: 19.5 Right: 18.3           PFT:  Documented in chart:     STS Score:0.93%  Mortality / Morbidity: 9.83%        Assessment / Acute Cardiac Problems:   Severe AS s/p TAVR on 8/24/21 with 34 mm CoreValve via Rt Fem approach  Temporary Pacing wires in place. Required pacing overnight rate was 50. This morning rate decreased to 40. Currently not requiring pacing. NICM (EF 25%)  COPD  Hypothyrodism  Chronic combined systolic and diastolic heart failure (EF 20%-25%, grade III DD)     Plan of Treatment:   Continue ASA & Plavix  Continue atorvasatin 20 mg daily  Continue lasix 20 mg PO BID  Restart coreg 3.125 mg BID today. If tolerates well plan to discharge this afternoon and follow up as outpatient for GDMT.  Hold off on ACEi/ARB

## 2021-08-27 NOTE — PLAN OF CARE
Problem: Anxiety:  Goal: Level of anxiety will decrease  Description: Level of anxiety will decrease  Outcome: Completed     Problem: Cardiac Output - Decreased:  Goal: Hemodynamic stability will improve  Description: Hemodynamic stability will improve  Outcome: Completed     Problem: Pain:  Goal: Pain level will decrease  Description: Pain level will decrease  Outcome: Completed  Goal: Control of acute pain  Description: Control of acute pain  Outcome: Completed  Goal: Control of chronic pain  Description: Control of chronic pain  Outcome: Completed     Problem: Skin Integrity:  Goal: Will show no infection signs and symptoms  Description: Will show no infection signs and symptoms  Outcome: Completed  Goal: Absence of new skin breakdown  Description: Absence of new skin breakdown  Outcome: Completed     Problem: Falls - Risk of:  Goal: Will remain free from falls  Description: Will remain free from falls  Outcome: Completed  Goal: Absence of physical injury  Description: Absence of physical injury  Outcome: Completed

## 2021-08-27 NOTE — PROGRESS NOTES
Writer met with pt and discussed pt's follow up appts at [facility]. Pt believes he will be able to get himself to appts at [address] [facility] location. Updated, pt's sister, Lorene Hemphill [phone], that pt plans on taking himself to [facility] and he is agreeable to plan. Updated Lorene Hemphill and pt on incidental finding from recent CT imaging:    Prominent to enlarged mediastinal lymph nodes, possibly reactive. Attention on follow-up is suggested. Updated that these results had already been shared per [facility] with pt's PCP. Lorene Hemphill states they planned on having the pt see his PCP following the TAVR procedure and it would not be problematic to follow up on this finding.      Pt was calm and relaxed awaiting the Zoll Rep to discuss discharging home with [employer]oll life vest.     Lilliana Ren APRN CNP  [facility]

## 2021-08-27 NOTE — PROGRESS NOTES
Patient educated on discharge instructions. Follow up appointments and medications. All questions answered.     Henok Guzman RN

## 2021-08-27 NOTE — FLOWSHEET NOTE
Assessment: Patient is a 63 y.o. male who was admitted to the hospital due to a \"transcatheter aortic valve replacement. \" Patient was sitting up in recliner, beside hospital bed, when  visited. Intervention:  visited patient per initial rounding visits.  introduced herself to patient and learned about his well-being. Patient shared that he is \"ready to sleep\" and \"needs assistance to bed. \" Karla Padgett located patient's bedside nurse and informed him of patient's needs.  provided support and hospitality during visit. Patient thanked  for visit. Outcome: Patient was receptive of 's visit and support. Plan: Chaplains can make follow-up visit, per request. Clydene Cure can be reached 24/7 via [employer]     08/26/21 8525   Encounter Summary   Services provided to: Patient   Referral/Consult From: Rounding   Continue Visiting   (8/26/2021)   Complexity of Encounter Low   Length of Encounter 15 minutes   Spiritual Assessment Completed Yes   Routine   Type Initial   Spiritual/Pentecostal   Type Spiritual support   Assessment Approachable;Coping   Intervention Sustaining presence/ Ministry of presence   Outcome Receptive

## 2021-08-27 NOTE — PROGRESS NOTES
Notified dr. Debbie Blanco of patient becoming andrew. Orders to walk him around unit to see if heart rate goes up>> patient walk and heart rate back up to 80s. Deena Vazquez also orders to have patient to cut in half coreg 3.125 and take half a pill bid. Patient will follow up early next week with cardio np.     Cheril Dakin, RN

## 2021-08-27 NOTE — PROGRESS NOTES
Was paged by RN regarding patient being bradycardiac with a heart rate of 39. Per RN patient in Sb with 1st degree AV block. Asked to ambulate patient around the unit and watch heart rate response. Per RN heart rate increased to 80s while patient was ambulatory on CVICU. plan to decrease Coreg dose and have patient follow-up outpatient. Discussed with Dr. Bowen Workman.   Wilfrid Mohs, MD  Cardiology fellow

## 2021-08-27 NOTE — DISCHARGE SUMMARY
[facility]  Discharge Note                 Name:  Kanika Barrios  YOB: 1960  Social Security Number:  [id number]  Medical Record Number:  [id number]    Date of Admission:  8/24/2021  Date of Discharge:  8/27/2021    Admitting physician: Kishan Stearns MD    Discharge Attending: Ernestina Riggs MD, MD  Primary Care Physician: Yisel Samaniego MD  Consultants: Cardiology  Discharge to Home  Condition at discharge: [address] LIST:  Patient Active Problem List   Diagnosis    Sciatica    Low back pain    Gout    Other ankle sprain and strain    Other testicular hypofunction    Generalized osteoarthrosis, unspecified site    Enthesopathy    Hypothyroid    Aortic stenosis    Alcohol abuse    Polycythemia    Chronic combined systolic (congestive) and diastolic (congestive) heart failure (HCC)    Valvular heart disease    Family history of premature CAD    Anxiety    Coronary artery disease involving native coronary artery without angina pectoris    Former smoker    Stenosis of right carotid artery    Mediastinal lymphadenopathy    COPD (chronic obstructive pulmonary disease) ([address]    S/P TAVR (transcatheter aortic valve replacement)         Procedures:TAVR    HOSPITAL COURSE :           The patient was admitted for: Severe AS  Hospital Procedures if any: TAVR  Medications changes recommendation: Please see below    Discharge exam:   Vitals:    08/27/21 1145   BP: (!) 141/58   Pulse: 65   Resp: 16   Temp: 98.3 °F (36.8 °C)   SpO2:      Neuro: normal  Chest: Clear to ausculation. No wheezing. Cardiac: Cor RRR  Abdomen/groin: soft, non-tender, without masses or organomegaly  Lower extremity edema: none     Follow up with primary care provider 1 week  Follow up with cardiology 4 weeks  Follow up with other consultant physicians at their directions.     Discharge Medications:   Basilio Moura   Home Medication Instructions UXY:[id number]    Printed on:08/27/21 2041 Medication Information                        aspirin 81 MG chewable tablet  Take 1 tablet by mouth daily             atorvastatin (LIPITOR) 20 MG tablet  Take 1 tablet by mouth nightly             carvedilol (COREG) 3.125 MG tablet  Take 1 tablet by mouth 2 times daily (with meals)             clopidogrel (PLAVIX) 75 MG tablet  Take 1 tablet by mouth daily             folic acid (FOLVITE) 1 MG tablet  Take 1 tablet by mouth daily             furosemide (LASIX) 20 MG tablet  Take 1 tablet by mouth 2 times daily             HYDROcodone-acetaminophen (NORCO) 7.5-325 MG per tablet  Take 1 tablet by mouth every 6 hours as needed for Pain.             levothyroxine (SYNTHROID) 100 MCG tablet  Take 1 tablet by mouth Daily             thiamine mononitrate (THIAMINE) 100 MG tablet  Take 1 tablet by mouth daily                Current Facility-Administered Medications: carvedilol (COREG) tablet 3.125 mg, 3.125 mg, Oral, BID WC  sennosides-docusate sodium (SENOKOT-S) 8.6-50 MG tablet 1 tablet, 1 tablet, Oral, BID PRN  0.9 % sodium chloride infusion, , IntraVENous, Continuous  0.9 % sodium chloride infusion, , IntraVENous, PRN  aspirin chewable tablet 81 mg, 81 mg, Oral, Daily  atorvastatin (LIPITOR) tablet 20 mg, 20 mg, Oral, Nightly  folic acid (FOLVITE) tablet 1 mg, 1 mg, Oral, Daily  furosemide (LASIX) tablet 20 mg, 20 mg, Oral, BID  levothyroxine (SYNTHROID) tablet 100 mcg, 100 mcg, Oral, Daily  meloxicam (MOBIC) tablet 7.5 mg, 7.5 mg, Oral, Daily  thiamine mononitrate tablet 100 mg, 100 mg, Oral, Daily  morphine (PF) injection 2 mg, 2 mg, IntraVENous, Q3H PRN  0.9 % sodium chloride infusion, , IntraVENous, Continuous  sodium chloride flush 0.9 % injection 5-40 mL, 5-40 mL, IntraVENous, 2 times per day  sodium chloride flush 0.9 % injection 5-40 mL, 5-40 mL, IntraVENous, PRN  0.9 % sodium chloride infusion, 25 mL, IntraVENous, PRN  acetaminophen (TYLENOL) tablet 650 mg, 650 mg, Oral, Q4H PRN  ondansetron (ZOFRAN) injection 4 mg, 4 mg, IntraVENous, Q6H PRN  clopidogrel (PLAVIX) tablet 75 mg, 75 mg, Oral, Daily  amiodarone (CORDARONE) injection 300 mg, 300 mg, IntraVENous, Once  [] amiodarone (CORDARONE) 450 mg in dextrose 5 % 250 mL infusion, 1 mg/min, IntraVENous, Continuous **FOLLOWED BY** amiodarone (CORDARONE) 450 mg in dextrose 5 % 250 mL infusion, 0.5 mg/min, IntraVENous, Continuous  HYDROcodone-acetaminophen (NORCO) 5-325 MG per tablet 1 tablet, 1 tablet, Oral, Q4H PRN **OR** HYDROcodone-acetaminophen (NORCO) 5-325 MG per tablet 2 tablet, 2 tablet, Oral, Q4H PRN  LORazepam (ATIVAN) injection 1 mg, 1 mg, IntraVENous, Q6H PRN    Coronary Discharge Core Measure: Please indicate the medication given by X, and if not the reasons not given:    Not Given Reason  Given      Beta Blockers x   X not started due to low BP   ACE-I       Statins x      ASA x      OAP (Plavix/Effient/Brilinta) x        Assessment / Acute Cardiac Problems:   Severe AS s/p TAVR on 21 with 34 mm CoreValve via Rt Fem approach  NICM (EF 25%)  COPD  Hypothyrodism  Chronic combined systolic and diastolic heart failure (EF 20%-25%, grade III DD)     Plan of Treatment:   Continue ASA & Plavix  Continue atorvasatin 20 mg daily  Continue lasix 20 mg PO BID  Continue Coreg 3.125 mg BID  K>4, Mg>2  In setting of EF being less than 35% will need LifeVest prior to discharge. Discussed with patient and nursing. Medications and discharge instructions reviewed with patient and nursing. Electronically signed by Adalid Herrera MD on 2021 at 2:07 [facility]          Attending Cardiologist Addendum: I have reviewed and performed the history, physical, subjective, objective, assessment, and plan with the student/resident/fellow/APN and agree with the note. I performed the history and physical personally. I have made changes to the note above as needed.     Thank you for allowing me to participate in the care of this patient, please do not hesitate to call if you have any questions. Ken Talley DO, [address], [address], [address] [facility]  [facility]  [phone]

## 2021-10-04 NOTE — PROCEDURES
PULMONARY FUNCTION TEST      COMPONENTS     Following components of the pulmonary function tests were performed during this study:    [x] Spirometry with the Forced Vital Capacity maneuver; with pre-and post- bronchodilator challenge  [] Spirometry with the Forced Vital Capacity maneuver; without pre-and post- bronchodilator challenge  [x] Flow-volume loop  [x] Lung volumes (Body plethysmography, when applicable)  [x] Airway resistance  [x] Diffusion capacity  [x] Resting oxygen saturation  [] Maximum inspiratory and expiratory pressures    QUALITY     Based on technician observations and review of the raw data, show that the study is of good quality and the results seem to reflect true performance capacity, as the patient demonstrated good effort and cooperation. MEASUREMENTS     PFT Date: 2021  FVC: 3.85 L (83% predicted), improved to 3.95 L (85% predicted), +2% change after bronchodilator  FEV1: 2.18 L (61% predicted), improved to 2.41 L (67% predicted), +10% change after bronchodilator  FEV1/FVC: 61%  T.2 L (102% predicted)  RV: 2.19 L (128% predicted)  RV/T%  DLCO: 22.16 mm/min/mmHg (68% predicted)    SPIROMETRY      Spirometry shows moderate obstructive defect. The flow-volume loop is compatible with an obstructive process. Following inhaled bronchodilator there was borderline response (9-11% improvement in the FEV1 or at least a 200ml increase)     LUNG VOLUMES      Total Lung Capacity (TLC) is is within normal limit     Air trapping is present. Airway resistance is not elevated    DIFFUSION CAPACITY (DLCO)     Diffusion capacity (DLCO) is mildly reduced (>60% and <75%)    FINAL IMPRESSION     The overall study shows moderate obstructive defect. There is borderline bronchodilator response, suggestive of bronchospastic component. Mild air trapping is present. The diffusion capacity is mildly decreased.   The overall pattern pattern of abnormality is consistent with obstructive airway disease such as COPD/emphysema. Comments:  Please correlate clinically.

## 2021-10-09 NOTE — OP NOTE
[address] [facility] [address]                                OPERATIVE REPORT    PATIENT NAME: Juarez Pelayo                    :        1960  MED REC NO:   [id number]                             ROOM:       1026  ACCOUNT NO:   [de-identified]                           ADMIT DATE: 2021  PROVIDER:     Corin Forde MD    DATE OF PROCEDURE:  2021    PREOPERATIVE DIAGNOSES:  Severe calcific aortic stenosis, frailty,  multiple comorbid conditions. POSTOPERATIVE DIAGNOSES:  Severe calcific aortic stenosis, frailty,  multiple comorbid conditions. PROCEDURES:  Transcatheter aortic valve replacement using a 34-mm  CoreValve via the right femoral artery approach, selective femoral  angiography and multiple aortic root injections, temporary venous  pacemaker placement in the right ventricle via the left femoral vein,  closure devices of both femoral arteries. SURGEON/CO-SURGEON:  Libby Yang MD and Corin Forde MD    COMPLICATIONS:  None. CONDITION:  Stable. DISPOSITION:  To CVICU. ESTIMATED BLOOD LOSS:  Not applicable. ANESTHESIA:  Local MAC. INDICATIONS FOR SURGERY:  This patient is a 68-year-old man who was  evaluated by the  TAVR team and ultimately chose TAVR type aortic valve  replacement. He was found to be an acceptable candidate, was taken for  this procedure with consent of he and his family on 2021. FINDINGS AT SURGERY:  There was a well-placed 34-mm Evolut CoreValve  with transthoracic echocardiogram findings showing no perivalvular leak  and no significant residual gradient by echo or angiography. SURGERY IN DETAIL:  The patient was identified in his bed in the [facility] and  was transported to the cath lab, where he was induced local MAC  anesthesia without difficulty.   He was then prepped and draped and the  procedure began with the accessing of bilateral femoral arteries. Working through the left femoral artery, a pigtail catheter was placed  in the noncoronary cusp of the aortic roots as a marker for the  remainder of the procedure. Working through the left femoral vein, the  temporary Kent pacemaker was inserted and tested and found to be  functional.  Through the right femoral artery then an 18-Uzbek sheath  was advanced over a stiff wire and hemodynamics were measured across the  valve. Ultimately, a Safari wire was _____ into the left ventricle. A  34-mm CoreValve device was brought onto the field and with myself in  position 2 and Dr. Ara Herman in position 1, we successfully simultaneously  worked to deploy the valve without difficulty. The echocardiogram was  as described above and both the groins were closed with closure devices  after the removal of the sheaths. The patient tolerated the procedure  well and was transported to the CVICU in stable condition. JACOBO Lozoya MD    D: 10/08/2021 13:38:01       T: 10/08/2021 15:49:16     [id number]  Job#: [id number]     Doc#: [id number]    CC:

## 2021-10-20 ENCOUNTER — HOSPITAL ENCOUNTER (OUTPATIENT)
Age: 61
Discharge: HOME | End: 2021-10-20
Payer: MEDICARE

## 2021-10-20 LAB
ALBUMIN SERPL-MCNC: 4.3 G/DL (ref 3.5–5.2)
ALBUMIN/GLOBULIN RATIO: 1.1 (ref 1–2.5)
ALP BLD-CCNC: 137 U/L (ref 40–129)
ALT SERPL-CCNC: 42 U/L (ref 5–41)
ANION GAP SERPL CALCULATED.3IONS-SCNC: 13 MMOL/L (ref 9–17)
AST SERPL-CCNC: 38 U/L
BILIRUB SERPL-MCNC: 0.34 MG/DL (ref 0.3–1.2)
BUN BLDV-MCNC: 39 MG/DL (ref 8–23)
BUN/CREAT BLD: ABNORMAL (ref 9–20)
CALCIUM SERPL-MCNC: 10 MG/DL (ref 8.6–10.4)
CHLORIDE BLD-SCNC: 102 MMOL/L (ref 98–107)
CO2: 23 MMOL/L (ref 20–31)
CREAT SERPL-MCNC: 1.12 MG/DL (ref 0.7–1.2)
GFR AFRICAN AMERICAN: >60 ML/MIN
GFR NON-AFRICAN AMERICAN: >60 ML/MIN
GFR SERPL CREATININE-BSD FRML MDRD: ABNORMAL ML/MIN/{1.73_M2}
GFR SERPL CREATININE-BSD FRML MDRD: ABNORMAL ML/MIN/{1.73_M2}
GLUCOSE BLD-MCNC: 102 MG/DL (ref 70–99)
POTASSIUM SERPL-SCNC: 4.9 MMOL/L (ref 3.7–5.3)
SODIUM BLD-SCNC: 138 MMOL/L (ref 135–144)
TOTAL PROTEIN: 8.2 G/DL (ref 6.4–8.3)

## 2021-10-20 PROCEDURE — 36415 COLL VENOUS BLD VENIPUNCTURE: CPT

## 2021-10-20 PROCEDURE — 80053 COMPREHEN METABOLIC PANEL: CPT

## 2021-11-23 RX ORDER — FOLIC ACID 1 MG/1
TABLET ORAL
Qty: 90 TABLET | Refills: 0 | Status: SHIPPED | OUTPATIENT
Start: 2021-11-23 | End: 2022-03-15 | Stop reason: SDUPTHER

## 2022-08-24 ENCOUNTER — TELEPHONE (OUTPATIENT)
Dept: CARDIOTHORACIC SURGERY | Age: 62
End: 2022-08-24

## 2022-08-24 DIAGNOSIS — I65.23 BILATERAL CAROTID ARTERY STENOSIS: Primary | ICD-10-CM

## 2022-08-30 ENCOUNTER — HOSPITAL ENCOUNTER (OUTPATIENT)
Dept: VASCULAR LAB | Age: 62
Discharge: HOME | End: 2022-08-30
Payer: MEDICARE

## 2022-08-30 DIAGNOSIS — I65.23 BILATERAL CAROTID ARTERY STENOSIS: ICD-10-CM

## 2022-08-30 PROCEDURE — 93880 EXTRACRANIAL BILAT STUDY: CPT

## 2022-09-02 ENCOUNTER — OFFICE VISIT (OUTPATIENT)
Dept: VASCULAR SURGERY | Age: 62
End: 2022-09-02
Payer: MEDICARE

## 2022-09-02 VITALS
OXYGEN SATURATION: 98 % | HEIGHT: 70 IN | SYSTOLIC BLOOD PRESSURE: 139 MMHG | HEART RATE: 62 BPM | TEMPERATURE: 98.2 F | DIASTOLIC BLOOD PRESSURE: 60 MMHG | BODY MASS INDEX: 27.06 KG/M2 | WEIGHT: 189 LBS

## 2022-09-02 DIAGNOSIS — I65.23 BILATERAL CAROTID ARTERY STENOSIS: Primary | ICD-10-CM

## 2022-09-02 PROCEDURE — G8419 CALC BMI OUT NRM PARAM NOF/U: HCPCS | Performed by: SURGERY

## 2022-09-02 PROCEDURE — 3017F COLORECTAL CA SCREEN DOC REV: CPT | Performed by: SURGERY

## 2022-09-02 PROCEDURE — 1036F TOBACCO NON-USER: CPT | Performed by: SURGERY

## 2022-09-02 PROCEDURE — 99213 OFFICE O/P EST LOW 20 MIN: CPT | Performed by: SURGERY

## 2022-09-02 PROCEDURE — G8428 CUR MEDS NOT DOCUMENT: HCPCS | Performed by: SURGERY

## 2022-09-02 NOTE — PROGRESS NOTES
[place] [address]  Dept: [phone]     Patient: Teresa Demarco  : 1960  MRN: [id number]  DOS: 2022            HPI:  Tereas Demarco is a 57 y.o. male who returns to the office regarding his carotid disease. Recall that he has a right carotid occlusion at the internal carotid artery. The common carotid is still patent on the right to the external.  The left carotid system has slightly elevated velocities consistent with a 50 to 69% stenosis more closely associated to 50%. In fact the velocities may be elevated due to the contralateral occlusion. I suggest that this represents no more than 50% stenosis. He has not had stroke, TIA or amaurosis fugax. He walks with a cane. He denies claudication rest pain or ulceration. Review of Systems    Vitals:    22 1109   BP: 139/60   Site: Right Upper Arm   Position: Sitting   Cuff Size: Medium Adult   Pulse: 62   Temp: 98.2 °F (36.8 °C)   TempSrc: Temporal   SpO2: 98%   Weight: 189 lb (85.7 kg)   Height: 5' 10\" (1.778 m)          Physical Exam  On examination he has of low rumbling bruits in his carotid artery bilaterally. He has no cardiac murmur. His chest is clear to auscultation bilaterally. His abdomen is soft and nontender and without apparent aneurysmal disease. His lower extremities are warm dry and well-perfused and he has palpable femoral, popliteal, dorsalis pedis and posterior tibial pulses bilaterally which are strong and equal.  He has no ulceration. He has no gangrene. He has no edema. Assessment:  1. Bilateral carotid artery stenosis          Plan: We will continue to follow him on a yearly basis with carotid duplex examination. He understands and agrees and we will see him at that time.     Electronically signed by:  Eliezer Saldivar MD

## 2022-10-21 ENCOUNTER — TELEPHONE (OUTPATIENT)
Dept: CARDIOLOGY CLINIC | Age: 62
End: 2022-10-21

## 2022-10-21 NOTE — TELEPHONE ENCOUNTER
Writer completed one year post TAVR [facility]. [address] Cardiomyopathy Questionnaire (XQCG-21)  The following questions refer to your heart failure and how it may affect your life. Please read and complete the  following questions. There are no right or wrong answers. Please юлия the answer that best applies to you. 1. Heart failure affects different people in different ways. Some feel shortness of breath while others feel fatigue. Please indicate how much you are limited by heart failure (shortness of breath or fatigue) in your ability to do  the following activities over the past 2 weeks. Activity                       Extremely      Limited Quite a bit    Limited Moderatly    Limited Slightly         Limited Not at all      Limted Limited for other   reasons / or no activity    1 2 3 4 5 6   a. Showering/bathing   []  []  []  []  [x]  []    b. Walking 1 block on level ground   []  []   []  []   [x]  []    c. Hurrying or jogging (As if to catch a bus) []  []   [x]  []    []  []   Total Score:          13     2. Over the past 2 weeks, how many times did you have swelling in your feet, ankles or legs when you woke up in the morning? Every morning 3 or more  times per wk  but not every day 1-2 times      a week Less than     once/ week Never over  past 2 weeks   1 2 3 4 5   []   []  []  []  [x]   Total Score        5     3. Over the past 2 weeks, on average, how many times has fatigue limited your ability to do what you wanted? All the time Several times  per day At least  once a day 3 or more times /  week but not daily 1-2 time/  week < 1/week Nver in last  2 weeks   1 2 3 4 5 6 7   []    []  []   []   [x]  []  []  Total Score          5     4. Over the past 2 weeks, on average, how many times has shortness of breath limited your ability to do what you wanted?     All the time Several times  per day At least  once a day 3 or more times /  week but not daily 1-2 time/  week < 1/week Nver in last  2 weeks   1 2 3 4 5 6 7    []   []   []  []   []   [x]   []   Total Score          6     5. Over the past 2 weeks, on average, how many times have you been forced to sleep sitting up in a chair or with at  least 3 pillows to prop you up because of shortness of breath? Every night 3 or more  times per wk  but not every day 1-2 times      a week Less than     once/ week Never over  past 2 weeks   1 2 3 4 5    []  []   []   []   [x]  Total Score        5     6.Over the past 2 weeks, how much has your heart failure limited your enjoyment of life? It has extremely   limited my  enjoyment of life It has limited  my enjoyment  of life quite a bit It has moderately  limited my  enjoyment of life It has slightly  limited my  enjoyment of life It has not  limited my enjoyment  of life at all   1 2 3 4 5   []     []     []     []     [x]     Total Score        5     7. If you had to spend the rest of your life with your heart failure the way it is right now, how would you feel about this? Not at all   satisfied Mostly   dissatisfied Somewhat  satisfied Mostly  satisfied Completely  satisfied   1 2 3 4 5    []  []   []    [x]   []  Total Score        4     8. How much does your heart failure affect your lifestyle? Please indicate how your heart failure may have limited  your participation in the following activities over the past 2 weeks. Activity                       Severely      Limited Limited  Quite a bit   Moderately   Limited Slightly         Limited Did not      Limit at all Does not apply  or did not do   for other reasons    1 2 3 4 5 6   a. Hobbies/recreational activities  []   []  []   []   [x]   []     b.  Working or doing household chores  []   []  []    [x]   []  []     c. Visiting family or friends out of the home []    []  []   []    [x]  []   Total Score:          14     Total Score Now:  57    Previous Reported Score:       Summary:

## 2023-07-26 NOTE — PROGRESS NOTES
TO [facility] room 1 from room 328. Questions answered. Informed of procedure . bilat groin hair clipped. Call light in reach .  Bed in low position Class II - visualization of the soft palate, fauces, and uvula

## 2024-07-16 ENCOUNTER — HOSPITAL ENCOUNTER (OUTPATIENT)
Dept: MRI IMAGING | Facility: CLINIC | Age: 64
Discharge: HOME | End: 2024-07-18

## 2024-07-16 DIAGNOSIS — R41.3 MEMORY LOSS: ICD-10-CM

## 2024-07-23 ENCOUNTER — HOSPITAL ENCOUNTER (OUTPATIENT)
Dept: NEUROLOGY | Age: 64
Discharge: HOME | End: 2024-07-23
Payer: MEDICARE

## 2024-07-23 ENCOUNTER — HOSPITAL ENCOUNTER (OUTPATIENT)
Dept: VASCULAR LAB | Age: 64
Discharge: HOME | End: 2024-07-25
Payer: MEDICARE

## 2024-07-23 DIAGNOSIS — R55 SYNCOPE AND COLLAPSE: ICD-10-CM

## 2024-07-23 DIAGNOSIS — R56.9 SEIZURE (HCC): ICD-10-CM

## 2024-07-23 DIAGNOSIS — G45.9 TIA (TRANSIENT ISCHEMIC ATTACK): ICD-10-CM

## 2024-07-23 DIAGNOSIS — R56.9 GENERALIZED-ONSET SEIZURES (HCC): ICD-10-CM

## 2024-07-23 LAB
VAS LEFT CCA DIST EDV: 9.3 CM/S
VAS LEFT CCA DIST PSV: 84.5 CM/S
VAS LEFT CCA MID EDV: 9.33 CM/S
VAS LEFT CCA MID PSV: 93.3 CM/S
VAS LEFT CCA PROX EDV: 9.8 CM/S
VAS LEFT CCA PROX PSV: 156 CM/S
VAS LEFT ECA PSV: 80.3 CM/S
VAS LEFT ICA DIST EDV: 25.5 CM/S
VAS LEFT ICA DIST PSV: 111 CM/S
VAS LEFT ICA MID EDV: 16.8 CM/S
VAS LEFT ICA MID PSV: 146 CM/S
VAS LEFT ICA PROX EDV: 29.8 CM/S
VAS LEFT ICA PROX PSV: 121 CM/S
VAS LEFT VERTEBRAL EDV: 11 CM/S
VAS LEFT VERTEBRAL PSV: 78.6 CM/S
VAS RIGHT CCA DIST EDV: 0 CM/S
VAS RIGHT CCA DIST PSV: 42.3 CM/S
VAS RIGHT CCA MID EDV: 0 CM/S
VAS RIGHT CCA MID PSV: 59.3 CM/S
VAS RIGHT CCA PROX PSV: 78.5 CM/S
VAS RIGHT ECA PSV: 114 CM/S
VAS RIGHT VERTEBRAL EDV: 9.02 CM/S
VAS RIGHT VERTEBRAL PSV: 74.9 CM/S

## 2024-07-23 PROCEDURE — 93880 EXTRACRANIAL BILAT STUDY: CPT | Performed by: STUDENT IN AN ORGANIZED HEALTH CARE EDUCATION/TRAINING PROGRAM

## 2024-07-23 PROCEDURE — 93880 EXTRACRANIAL BILAT STUDY: CPT

## 2024-07-23 PROCEDURE — 95819 EEG AWAKE AND ASLEEP: CPT | Performed by: PSYCHIATRY & NEUROLOGY

## 2024-07-23 PROCEDURE — 95816 EEG AWAKE AND DROWSY: CPT

## 2024-07-23 NOTE — PROCEDURES
PROCEDURE NOTE  Date: 7/23/2024   Name: Victor Hugo Lama  YOB: 1960    Procedures          Date: 7/23/2024  Referring physician: Dr. Barcenas    Indication  Patient aged 63 y with possible seizures. EEG done to assess for epileptiform activity.    Introduction  This routine 25-minute EEG was recorded using the International 10-20 System on a [employer] workstation at 256 samples/s. Automated spike and seizure detection algorithms were applied.    Description  During the maximal alert state, a well-regulated, symmetric, and reactive 9 Hz posterior dominant rhythm was seen. No consistent focal slowing or interhemispheric asymmetry was noted. Stage I and stage II sleep were observed. There were no interictal epileptiform discharges or electrographic seizures.    Activations  Hyperventilation was not performed. Intermittent photic stimulation was performed and demonstrated no posterior driving response.    Impression  Normal awake and sleep EEG.     EKG lead did not show clear arrhythmia, if still in concern consider formal EKG or correlation with telemetry.     No epileptiform discharges were identified. Please note the absence of such activity on this record cannot conclusively rule out an epileptic disorder. If such is still clinically suspected, a repeat study with sleep deprivation and/or prolonged sampling may be helpful.    Linsey Gan MD  Epilepsy Board Certified.  Neurology Board Certified.    Electronically Signed

## 2024-07-30 ENCOUNTER — HOSPITAL ENCOUNTER (OUTPATIENT)
Dept: MRI IMAGING | Facility: CLINIC | Age: 64
Discharge: HOME | End: 2024-08-01
Payer: MEDICARE

## 2024-07-30 DIAGNOSIS — R41.3 MEMORY LOSS: ICD-10-CM

## 2024-07-30 PROCEDURE — 70551 MRI BRAIN STEM W/O DYE: CPT

## 2024-08-06 ENCOUNTER — HOSPITAL ENCOUNTER (OUTPATIENT)
Dept: GENERAL RADIOLOGY | Facility: CLINIC | Age: 64
Discharge: HOME | End: 2024-08-08
Payer: MEDICARE

## 2024-08-06 ENCOUNTER — HOSPITAL ENCOUNTER (OUTPATIENT)
Facility: CLINIC | Age: 64
Discharge: HOME | End: 2024-08-08
Payer: MEDICARE

## 2024-08-06 DIAGNOSIS — M25.552 BILATERAL HIP PAIN: ICD-10-CM

## 2024-08-06 DIAGNOSIS — M25.551 BILATERAL HIP PAIN: ICD-10-CM

## 2024-08-06 PROCEDURE — 73502 X-RAY EXAM HIP UNI 2-3 VIEWS: CPT

## 2024-10-31 NOTE — PROGRESS NOTES
Victor Hugo Lama (:  1960) is a 63 y.o. male,New patient, here for evaluation of the following chief complaint(s):  Establish Care and Discuss Medications (Pain meds for hip pain ,  recommended hip replacement but patient is afraid.)         Assessment & Plan  Encounter for medical examination to establish care  Patient here to establish care, used to follow up with Dr. Barcenas, currently insurance can't cover  Reviewed history  Patient currently with history of chronic hip pain and left sided sciatica, previous physician had recommended surgery but patient states that he is currently not willing to go through with that, was prescribed Norco 90 tablets a month to help with the pain  -Labs and imaging reviewed, highly recommended physical therapy   -patient states that he does not take his Norco 3 times a day, takes it once or twice a day, will decrease amount to 60 per month, patient agreeable   -Patient signed Opioid agreement, discussed risk of being on a narcotic, patient not able to urinate today to obtain urine drug screen, will have appointment in a few weeks for follow up          Sciatica of left side   Patient currently with history of chronic hip pain and left sided sciatica, previous physician had recommended surgery but patient states that he is currently not willing to go through with that, was prescribed Norco 90 tablets a month to help with the pain  -Labs and imaging reviewed, highly recommended physical therapy   -patient states that he does not take his Norco 7.5 3 times a day, takes it once or twice a day, will decrease amount to 60 tablets per month, patient agreeable   -Patient signed Opioid agreement, discussed risk of being on a narcotic   - will refill [place] today 7.5 will decrease from 90 tablets a month to 60 tablets a month       Orders:    HYDROcodone-acetaminophen (NORCO) 7.5-325 MG per tablet; Take 1 tablet by mouth 2 times daily for 30 days. Intended supply: 30 days

## 2024-11-01 ENCOUNTER — TELEPHONE (OUTPATIENT)
Dept: FAMILY MEDICINE CLINIC | Age: 64
End: 2024-11-01

## 2024-11-01 ENCOUNTER — OFFICE VISIT (OUTPATIENT)
Dept: FAMILY MEDICINE CLINIC | Age: 64
End: 2024-11-01

## 2024-11-01 VITALS
DIASTOLIC BLOOD PRESSURE: 78 MMHG | HEIGHT: 70 IN | TEMPERATURE: 98.1 F | WEIGHT: 205 LBS | BODY MASS INDEX: 29.35 KG/M2 | HEART RATE: 56 BPM | OXYGEN SATURATION: 97 % | SYSTOLIC BLOOD PRESSURE: 122 MMHG

## 2024-11-01 DIAGNOSIS — M54.32 SCIATICA OF LEFT SIDE: ICD-10-CM

## 2024-11-01 DIAGNOSIS — G89.29 HIP PAIN, CHRONIC, RIGHT: ICD-10-CM

## 2024-11-01 DIAGNOSIS — M25.551 HIP PAIN, CHRONIC, RIGHT: ICD-10-CM

## 2024-11-01 DIAGNOSIS — Z12.2 ENCOUNTER FOR SCREENING FOR LUNG CANCER: ICD-10-CM

## 2024-11-01 DIAGNOSIS — Z00.00 ENCOUNTER FOR MEDICAL EXAMINATION TO ESTABLISH CARE: Primary | ICD-10-CM

## 2024-11-01 RX ORDER — HYDROCODONE BITARTRATE AND ACETAMINOPHEN 7.5; 325 MG/1; MG/1
1 TABLET ORAL 2 TIMES DAILY
Qty: 60 TABLET | Refills: 0 | Status: SHIPPED | OUTPATIENT
Start: 2024-11-01 | End: 2024-12-01

## 2024-11-01 RX ORDER — HYDROCODONE BITARTRATE AND ACETAMINOPHEN 7.5; 325 MG/1; MG/1
1 TABLET ORAL EVERY 6 HOURS PRN
COMMUNITY

## 2024-11-01 RX ORDER — HYDROCODONE BITARTRATE AND ACETAMINOPHEN 7.5; 325 MG/1; MG/1
1 TABLET ORAL 3 TIMES DAILY PRN
Qty: 90 TABLET | Refills: 0 | Status: CANCELLED | OUTPATIENT
Start: 2024-11-01 | End: 2024-12-01

## 2024-11-01 RX ORDER — HYDROCODONE BITARTRATE AND ACETAMINOPHEN 7.5; 325 MG/1; MG/1
1 TABLET ORAL 2 TIMES DAILY
Qty: 60 TABLET | Refills: 0 | Status: CANCELLED | OUTPATIENT
Start: 2024-11-01 | End: 2024-12-01

## 2024-11-01 SDOH — ECONOMIC STABILITY: INCOME INSECURITY: HOW HARD IS IT FOR YOU TO PAY FOR THE VERY BASICS LIKE FOOD, HOUSING, MEDICAL CARE, AND HEATING?: NOT HARD AT ALL

## 2024-11-01 SDOH — ECONOMIC STABILITY: FOOD INSECURITY: WITHIN THE PAST 12 MONTHS, THE FOOD YOU BOUGHT JUST DIDN'T LAST AND YOU DIDN'T HAVE MONEY TO GET MORE.: NEVER TRUE

## 2024-11-01 SDOH — ECONOMIC STABILITY: FOOD INSECURITY: WITHIN THE PAST 12 MONTHS, YOU WORRIED THAT YOUR FOOD WOULD RUN OUT BEFORE YOU GOT MONEY TO BUY MORE.: NEVER TRUE

## 2024-11-01 NOTE — TELEPHONE ENCOUNTER
Please verify qty - on PDMP review he was getting 90 pills monthly from previous PCP - if given 60 in error for 30 days,will not be able to change quantity for 30 days or provide early refill .

## 2024-11-04 DIAGNOSIS — M54.32 SCIATICA OF LEFT SIDE: ICD-10-CM

## 2024-11-04 DIAGNOSIS — G89.29 HIP PAIN, CHRONIC, RIGHT: ICD-10-CM

## 2024-11-04 DIAGNOSIS — M25.551 HIP PAIN, CHRONIC, RIGHT: ICD-10-CM

## 2024-11-04 RX ORDER — HYDROCODONE BITARTRATE AND ACETAMINOPHEN 7.5; 325 MG/1; MG/1
1 TABLET ORAL 2 TIMES DAILY
Qty: 60 TABLET | Refills: 0 | Status: SHIPPED | OUTPATIENT
Start: 2024-11-04 | End: 2024-12-04

## 2024-11-04 NOTE — TELEPHONE ENCOUNTER
Pt was seen 11/01/2024 by Dr. Burgos.     Norco: E-Prescribing Status: Transmission to pharmacy failed (11/1/2024 10:47 AM EDT)       Can you please resend?

## 2024-11-14 RX ORDER — ATORVASTATIN CALCIUM 20 MG/1
20 TABLET, FILM COATED ORAL NIGHTLY
Qty: 30 TABLET | Refills: 11 | Status: SHIPPED | OUTPATIENT
Start: 2024-11-14

## 2024-11-14 NOTE — TELEPHONE ENCOUNTER
Last visit: 11/01/2024  Last Med refill: previous PCP  Does patient have enough medication for 72 hours: No:     Next Visit Date:  Future Appointments   Date Time Provider Department Center   11/15/2024  9:00 AM STV CT ROOM 1 [facility] CT SCAN [facility] Radiolog   12/3/2024  9:00 AM Alondra Burgos MD [facility] ECC DEP   2/26/2025  1:15 PM Theo Kenny, APRN - NP [facility]       Health Maintenance   Topic Date Due    HIV screen  Never done    Hepatitis C screen  Never done    DTaP/Tdap/Td vaccine (1 - Tdap) Never done    Shingles vaccine (1 of 2) Never done    Respiratory Syncytial Virus (RSV) Pregnant or age 60 yrs+ (1 - 1-dose 60+ series) Never done    Lung Cancer Screening &/or Counseling  08/17/2022    Annual Wellness Visit (Medicare Advantage)  Never done    COVID-19 Vaccine (3 - 2023-24 season) 09/01/2024    Depression Screen  01/02/2025    A1C test (Diabetic or Prediabetic)  07/02/2025    Lipids  07/02/2025    Colorectal Cancer Screen  12/14/2025    Flu vaccine  Completed    Pneumococcal 0-64 years Vaccine  Completed    Hepatitis A vaccine  Aged Out    Hepatitis B vaccine  Aged Out    Hib vaccine  Aged Out    Polio vaccine  Aged Out    Meningococcal (ACWY) vaccine  Aged Out    Diabetes screen  Discontinued    Prostate Specific Antigen (PSA) Screening or Monitoring  Discontinued       Hemoglobin A1C (%)   Date Value   07/02/2024 5.7   04/10/2024 5.9   05/05/2023 5.6             ( goal A1C is < 7)   No components found for: \"LABMICR\"  No components found for: \"LDLCHOLESTEROL\", \"LDLCALC\"    (goal LDL is <100)   AST (U/L)   Date Value   07/02/2024 47 (H)     ALT (U/L)   Date Value   07/02/2024 36     BUN (MG/DL)   Date Value   07/02/2024 23 (H)     BP Readings from Last 3 Encounters:   11/01/24 122/78   10/01/24 112/64   09/09/24 128/64          (goal 120/80)    All Future Testing planned in [facility]  Lab Frequency Next Occurrence   Basic Metabolic Panel, Fasting Once 04/24/2024   PSA, Prostatic

## 2024-11-15 ENCOUNTER — HOSPITAL ENCOUNTER (OUTPATIENT)
Dept: CT IMAGING | Age: 64
Discharge: HOME | End: 2024-11-17
Attending: STUDENT IN AN ORGANIZED HEALTH CARE EDUCATION/TRAINING PROGRAM
Payer: MEDICARE

## 2024-11-15 DIAGNOSIS — Z12.2 ENCOUNTER FOR SCREENING FOR LUNG CANCER: ICD-10-CM

## 2024-11-15 PROCEDURE — 71271 CT THORAX LUNG CANCER SCR C-: CPT

## 2024-11-15 NOTE — RESULT ENCOUNTER NOTE
If we can let the patient know that the CT for his lungs looks good, will repeat annually, let me know if he has any questions

## 2024-12-03 ENCOUNTER — OFFICE VISIT (OUTPATIENT)
Dept: FAMILY MEDICINE CLINIC | Age: 64
End: 2024-12-03

## 2024-12-03 ENCOUNTER — HOSPITAL ENCOUNTER (OUTPATIENT)
Age: 64
Setting detail: SPECIMEN
Discharge: HOME | End: 2024-12-03

## 2024-12-03 VITALS
BODY MASS INDEX: 29.7 KG/M2 | HEART RATE: 68 BPM | WEIGHT: 207 LBS | OXYGEN SATURATION: 68 % | SYSTOLIC BLOOD PRESSURE: 130 MMHG | DIASTOLIC BLOOD PRESSURE: 72 MMHG

## 2024-12-03 DIAGNOSIS — E03.9 HYPOTHYROIDISM, UNSPECIFIED TYPE: ICD-10-CM

## 2024-12-03 DIAGNOSIS — Z02.89 MEDICATION MANAGEMENT CONTRACT AGREEMENT: ICD-10-CM

## 2024-12-03 DIAGNOSIS — M54.32 SCIATICA OF LEFT SIDE: ICD-10-CM

## 2024-12-03 DIAGNOSIS — M25.552 CHRONIC HIP PAIN, BILATERAL: Primary | ICD-10-CM

## 2024-12-03 DIAGNOSIS — M25.551 HIP PAIN, CHRONIC, RIGHT: ICD-10-CM

## 2024-12-03 DIAGNOSIS — M25.551 CHRONIC HIP PAIN, BILATERAL: Primary | ICD-10-CM

## 2024-12-03 DIAGNOSIS — G89.29 HIP PAIN, CHRONIC, RIGHT: ICD-10-CM

## 2024-12-03 DIAGNOSIS — G89.29 CHRONIC HIP PAIN, BILATERAL: Primary | ICD-10-CM

## 2024-12-03 LAB — TSH SERPL DL<=0.05 MIU/L-ACNC: 2.64 UIU/ML (ref 0.27–4.2)

## 2024-12-03 RX ORDER — FENOFIBRATE 145 MG/1
145 TABLET, COATED ORAL DAILY
Qty: 90 TABLET | Refills: 1 | Status: SHIPPED | OUTPATIENT
Start: 2024-12-03

## 2024-12-03 RX ORDER — FUROSEMIDE 20 MG/1
20 TABLET ORAL DAILY
Qty: 90 TABLET | Refills: 3 | Status: SHIPPED | OUTPATIENT
Start: 2024-12-03

## 2024-12-03 RX ORDER — DONEPEZIL HYDROCHLORIDE 5 MG/1
5 TABLET, FILM COATED ORAL NIGHTLY
Qty: 30 TABLET | Refills: 3 | Status: SHIPPED | OUTPATIENT
Start: 2024-12-03

## 2024-12-03 RX ORDER — HYDROCODONE BITARTRATE AND ACETAMINOPHEN 7.5; 325 MG/1; MG/1
1 TABLET ORAL 2 TIMES DAILY
Qty: 60 TABLET | Refills: 0 | Status: SHIPPED | OUTPATIENT
Start: 2024-12-03 | End: 2025-01-02

## 2024-12-03 RX ORDER — FOLIC ACID 1 MG/1
TABLET ORAL
Qty: 90 TABLET | Refills: 11 | Status: SHIPPED | OUTPATIENT
Start: 2024-12-03

## 2024-12-03 RX ORDER — BIOTIN 1 MG
1 TABLET ORAL DAILY
COMMUNITY

## 2024-12-03 RX ORDER — AMOXICILLIN 500 MG
1 CAPSULE ORAL DAILY
COMMUNITY

## 2024-12-03 RX ORDER — LEVOTHYROXINE SODIUM 100 UG/1
100 TABLET ORAL DAILY
Qty: 90 TABLET | Refills: 11 | Status: SHIPPED | OUTPATIENT
Start: 2024-12-03

## 2024-12-03 ASSESSMENT — ENCOUNTER SYMPTOMS: BACK PAIN: 1

## 2024-12-03 ASSESSMENT — PATIENT HEALTH QUESTIONNAIRE - PHQ9
SUM OF ALL RESPONSES TO PHQ9 QUESTIONS 1 & 2: 0
1. LITTLE INTEREST OR PLEASURE IN DOING THINGS: NOT AT ALL
SUM OF ALL RESPONSES TO PHQ QUESTIONS 1-9: 0
2. FEELING DOWN, DEPRESSED OR HOPELESS: NOT AT ALL
SUM OF ALL RESPONSES TO PHQ QUESTIONS 1-9: 0

## 2024-12-03 NOTE — PROGRESS NOTES
Victor Hugo Lama (:  1960) is a 63 y.o. male,Established patient, here for evaluation of the following chief complaint(s):  Follow-up and Discuss Medications (Levothyroxine and Folic Acid-does not have medication)         Assessment & Plan  Chronic hip pain, bilateral  patient currently with history of chronic hip pain and left sided sciatica, previous physician had recommended surgery but patient states that he is currently not willing to go through with that, was prescribed Norco 90 tablets a month to help with the pain  -Labs and imaging reviewed, highly recommended physical therapy previously   -will send in for pain management due to pain not being controlled   Will follow up with patient in 3 months     Orders:    MISCELLANEOUS SENDOUT oral swab; Future    Sciatica of left side   Lower back pain with activity and long periods of inactivity   Had previously recommended physical therapy, patient uses cane   Currently on Norco which was started by prior PCP due to pain, patient states that pain is still not fully controlled   Will refer to pain management for further evaluation     Orders:    HYDROcodone-acetaminophen (NORCO) 7.5-325 MG per tablet; Take 1 tablet by mouth 2 times daily for 30 days. Intended supply: 30 days Max Daily Amount: 2 tablets    [facility] - Shanti Badillo, CNP, Pain Management, [place]    MISCELLANEOUS SENDOUT oral swab; Future    Hip pain, chronic, right   patient currently with history of chronic hip pain and left sided sciatica, previous physician had recommended surgery but patient states that he is currently not willing to go through with that, was prescribed Norco 90 tablets a month to help with the pain  -Labs and imaging reviewed, highly recommended physical therapy previously   -will send in for pain management due to pain not being controlled     Orders:    HYDROcodone-acetaminophen (NORCO) 7.5-325 MG per tablet; Take 1 tablet by mouth 2 times daily for 30 days. Intended

## 2024-12-03 NOTE — ASSESSMENT & PLAN NOTE
Lower back pain with activity and long periods of inactivity   Had previously recommended physical therapy, patient uses cane   Currently on Norco which was started by prior PCP due to pain, patient states that pain is still not fully controlled   Will refer to pain management for further evaluation     Orders:    HYDROcodone-acetaminophen (NORCO) 7.5-325 MG per tablet; Take 1 tablet by mouth 2 times daily for 30 days. Intended supply: 30 days Max Daily Amount: 2 tablets    [facility] - Shanti Badillo, CNP, Pain Management, [place]    MISCELLANEOUS SENDOUT oral swab; Future

## 2024-12-03 NOTE — ASSESSMENT & PLAN NOTE
Will check thyroid level, no recent level   Per records patient was on levothyroxine which patient states that he has not been taking for a while and didn't know he needed to take it   Will order thyroid level     Orders:    TSH With Reflex Ft4; Future

## 2024-12-11 DIAGNOSIS — M54.32 SCIATICA OF LEFT SIDE: ICD-10-CM

## 2024-12-11 DIAGNOSIS — G89.29 CHRONIC HIP PAIN, BILATERAL: ICD-10-CM

## 2024-12-11 DIAGNOSIS — Z02.89 MEDICATION MANAGEMENT CONTRACT AGREEMENT: ICD-10-CM

## 2024-12-11 DIAGNOSIS — G89.29 HIP PAIN, CHRONIC, RIGHT: ICD-10-CM

## 2024-12-11 DIAGNOSIS — M25.551 HIP PAIN, CHRONIC, RIGHT: ICD-10-CM

## 2024-12-11 DIAGNOSIS — M25.552 CHRONIC HIP PAIN, BILATERAL: ICD-10-CM

## 2024-12-11 DIAGNOSIS — M25.551 CHRONIC HIP PAIN, BILATERAL: ICD-10-CM

## 2024-12-26 RX ORDER — ATORVASTATIN CALCIUM 20 MG/1
20 TABLET, FILM COATED ORAL NIGHTLY
Qty: 90 TABLET | Refills: 1 | Status: SHIPPED | OUTPATIENT
Start: 2024-12-26

## 2024-12-26 RX ORDER — DONEPEZIL HYDROCHLORIDE 5 MG/1
5 TABLET, FILM COATED ORAL NIGHTLY
Qty: 90 TABLET | Refills: 1 | Status: SHIPPED | OUTPATIENT
Start: 2024-12-26

## 2024-12-26 NOTE — TELEPHONE ENCOUNTER
Insurance is requesting a 90 day supply     LAST OV WAS     12/03/24  NEXT OV IS          03/04/25  LAST ORDERED  12/03/24

## 2024-12-31 DIAGNOSIS — M25.551 HIP PAIN, CHRONIC, RIGHT: ICD-10-CM

## 2024-12-31 DIAGNOSIS — M54.32 SCIATICA OF LEFT SIDE: ICD-10-CM

## 2024-12-31 DIAGNOSIS — G89.29 HIP PAIN, CHRONIC, RIGHT: ICD-10-CM

## 2025-01-02 RX ORDER — HYDROCODONE BITARTRATE AND ACETAMINOPHEN 7.5; 325 MG/1; MG/1
1 TABLET ORAL 2 TIMES DAILY
Qty: 60 TABLET | Refills: 0 | Status: SHIPPED | OUTPATIENT
Start: 2025-01-02 | End: 2025-02-01

## 2025-01-02 NOTE — TELEPHONE ENCOUNTER
Pdmp reviewed, medication refilled   
Frequency Next Occurrence   Basic Metabolic Panel, Fasting Once 04/24/2024   PSA, Prostatic Specific Antigen Once 04/24/2024   T4, Free Once 04/24/2024   Folate Once 04/24/2024   Vitamin B12 Once 04/24/2024   CBC with Auto Differential Once 04/24/2024   Vitamin D 25 Hydroxy Once 04/24/2024   TSH Once 04/24/2024   Testosterone, Free Once 04/24/2024   Hemoglobin A1C Once 04/24/2024   AST Once 04/24/2024   ALT Once 04/24/2024   Lipid Panel Once 04/24/2024   Basic Metabolic Panel, Fasting Once 07/16/2024   PSA, Prostatic Specific Antigen Once 07/16/2024   T4, Free Once 07/16/2024   Folate Once 07/16/2024   Vitamin B12 Once 07/16/2024   CBC with Auto Differential Once 07/16/2024   Vitamin D 25 Hydroxy Once 07/16/2024   TSH Once 07/16/2024   Hemoglobin A1C Once 07/16/2024   AST Once 07/16/2024   ALT Once 07/16/2024   Lipid Panel Once 07/16/2024   Testosterone, free, total Once 08/02/2024   VL DUPLEX EXTRACRANIAL ARTERIES BILATERAL Once 07/02/2024   Hemoglobin and Hematocrit, Blood, Post Transfusion POST TRANSFUSION                Patient Active Problem List:     Sciatica     Low back pain     Gout     Other ankle sprain and strain     Other testicular hypofunction     Generalized osteoarthrosis, unspecified site     Enthesopathy     Hypothyroid     Aortic stenosis     Alcohol abuse     Polycythemia     Chronic combined systolic (congestive) and diastolic (congestive) heart failure ([facility])     Valvular heart disease     Family history of premature CAD     Anxiety     Coronary artery disease involving native coronary artery without angina pectoris     Former smoker     Stenosis of right carotid artery     Mediastinal lymphadenopathy     COPD (chronic obstructive pulmonary disease) ([facility])     S/P TAVR (transcatheter aortic valve replacement)

## 2025-01-29 ENCOUNTER — TELEPHONE (OUTPATIENT)
Dept: FAMILY MEDICINE CLINIC | Age: 65
End: 2025-01-29

## 2025-01-29 DIAGNOSIS — M25.551 HIP PAIN, CHRONIC, RIGHT: ICD-10-CM

## 2025-01-29 DIAGNOSIS — M54.32 SCIATICA OF LEFT SIDE: ICD-10-CM

## 2025-01-29 DIAGNOSIS — G89.29 HIP PAIN, CHRONIC, RIGHT: ICD-10-CM

## 2025-01-29 RX ORDER — HYDROCODONE BITARTRATE AND ACETAMINOPHEN 7.5; 325 MG/1; MG/1
1 TABLET ORAL 2 TIMES DAILY
Qty: 60 TABLET | Refills: 0 | Status: SHIPPED | OUTPATIENT
Start: 2025-02-02 | End: 2025-03-04

## 2025-01-29 NOTE — TELEPHONE ENCOUNTER
Med contract was scanned in, but it was attached under Consents and not Medication Contract.  I was able to fix this. Let us know if it still doesn't show.

## 2025-01-29 NOTE — TELEPHONE ENCOUNTER
Frequency Next Occurrence   Basic Metabolic Panel, Fasting Once 04/24/2024   PSA, Prostatic Specific Antigen Once 04/24/2024   T4, Free Once 04/24/2024   Folate Once 04/24/2024   Vitamin B12 Once 04/24/2024   CBC with Auto Differential Once 04/24/2024   Vitamin D 25 Hydroxy Once 04/24/2024   TSH Once 04/24/2024   Testosterone, Free Once 04/24/2024   Hemoglobin A1C Once 04/24/2024   AST Once 04/24/2024   ALT Once 04/24/2024   Lipid Panel Once 04/24/2024   Basic Metabolic Panel, Fasting Once 07/16/2024   PSA, Prostatic Specific Antigen Once 07/16/2024   T4, Free Once 07/16/2024   Folate Once 07/16/2024   Vitamin B12 Once 07/16/2024   CBC with Auto Differential Once 07/16/2024   Vitamin D 25 Hydroxy Once 07/16/2024   TSH Once 07/16/2024   Hemoglobin A1C Once 07/16/2024   AST Once 07/16/2024   ALT Once 07/16/2024   Lipid Panel Once 07/16/2024   Testosterone, free, total Once 08/02/2024   VL DUPLEX EXTRACRANIAL ARTERIES BILATERAL Once 07/02/2024   Hemoglobin and Hematocrit, Blood, Post Transfusion POST TRANSFUSION                Patient Active Problem List:     Sciatica     Low back pain     Gout     Other ankle sprain and strain     Other testicular hypofunction     Generalized osteoarthrosis, unspecified site     Enthesopathy     Hypothyroid     Aortic stenosis     Alcohol abuse     Polycythemia     Chronic combined systolic (congestive) and diastolic (congestive) heart failure ([facility])     Valvular heart disease     Family history of premature CAD     Anxiety     Coronary artery disease involving native coronary artery without angina pectoris     Former smoker     Stenosis of right carotid artery     Mediastinal lymphadenopathy     COPD (chronic obstructive pulmonary disease) ([facility])     S/P TAVR (transcatheter aortic valve replacement)

## 2025-01-29 NOTE — TELEPHONE ENCOUNTER
----- Message from Dr. Alondra Burgos MD sent at 1/29/2025 11:45 AM EST -----  Regarding: narcotic agreement  Can we make sure we have his agreement uploaded, I know me and him signed one together in previous visits. Every time I'm ordering it, it is telling me that there is no agreement on file, just want to make sure its uploaded please

## 2025-01-30 NOTE — TELEPHONE ENCOUNTER
Received a call in regards to Norco not able to be filled until 02/02/25. I spoke with [facility] at [facility] to confirm they are closed on Sundays.   Per Dr Burgos is okay to give a verbal that the Norco can be filled on 01/31/25 for a start date of 02/01/25.  Victor Hugo was notified that the Norco script can be filled on 01/31/25 for start date of 02/01/25.  Next available fill date for Norco would be 03/02/25 due to Feb only having 28 days

## 2025-02-26 DIAGNOSIS — M54.32 SCIATICA OF LEFT SIDE: ICD-10-CM

## 2025-02-26 DIAGNOSIS — G89.29 HIP PAIN, CHRONIC, RIGHT: ICD-10-CM

## 2025-02-26 DIAGNOSIS — M25.551 HIP PAIN, CHRONIC, RIGHT: ICD-10-CM

## 2025-02-26 RX ORDER — HYDROCODONE BITARTRATE AND ACETAMINOPHEN 7.5; 325 MG/1; MG/1
1 TABLET ORAL 2 TIMES DAILY
Qty: 60 TABLET | Refills: 0 | Status: SHIPPED | OUTPATIENT
Start: 2025-02-26 | End: 2025-03-28

## 2025-02-26 NOTE — TELEPHONE ENCOUNTER
Last visit: 12/3/24  Last Med refill: 2/2/25  Does patient have enough medication for 72 hours: Yes    Next Visit Date:  Future Appointments   Date Time Provider Department Center   3/4/2025  9:00 AM Alondra Burgos MD [facility] ECC DEP   8/18/2025 10:45 AM Evelin Cummings MD [facility]       Health Maintenance   Topic Date Due    DTaP/Tdap/Td vaccine (1 - Tdap) Never done    AAA screen  12/18/2024    Annual Wellness Visit (Medicare Advantage)  Never done    Shingles vaccine (1 of 2) 12/03/2025 (Originally 12/18/2009)    COVID-19 Vaccine (3 - 2024-25 season) 12/03/2025 (Originally 9/1/2024)    Respiratory Syncytial Virus (RSV) Pregnant or age 60 yrs+ (1 - Risk 60-74 years 1-dose series) 12/03/2025 (Originally 12/18/2019)    A1C test (Diabetic or Prediabetic)  07/02/2025    Lipids  07/02/2025    Lung Cancer Screening &/or Counseling  11/15/2025    Depression Screen  12/03/2025    Colorectal Cancer Screen  12/14/2025    Flu vaccine  Completed    Pneumococcal 50+ years Vaccine  Completed    Hepatitis A vaccine  Aged Out    Hepatitis B vaccine  Aged Out    Hib vaccine  Aged Out    Polio vaccine  Aged Out    Meningococcal (ACWY) vaccine  Aged Out    Pneumococcal 0-49 years Vaccine  Discontinued    Diabetes screen  Discontinued    Hepatitis C screen  Discontinued    HIV screen  Discontinued    Prostate Specific Antigen (PSA) Screening or Monitoring  Discontinued       Hemoglobin A1C (%)   Date Value   07/02/2024 5.7   04/10/2024 5.9   05/05/2023 5.6             ( goal A1C is < 7)   No components found for: \"LABMICR\"  No components found for: \"LDLCHOLESTEROL\", \"LDLCALC\"    (goal LDL is <100)   AST (U/L)   Date Value   07/02/2024 47 (H)     ALT (U/L)   Date Value   07/02/2024 36     BUN (MG/DL)   Date Value   07/02/2024 23 (H)     BP Readings from Last 3 Encounters:   02/26/25 (!) 150/68   12/03/24 130/72   11/01/24 122/78          (goal 120/80)    All Future Testing planned in [facility]  Lab Frequency

## 2025-03-04 ENCOUNTER — HOSPITAL ENCOUNTER (OUTPATIENT)
Facility: CLINIC | Age: 65
Discharge: HOME | End: 2025-03-06
Payer: MEDICARE

## 2025-03-04 ENCOUNTER — OFFICE VISIT (OUTPATIENT)
Dept: FAMILY MEDICINE CLINIC | Age: 65
End: 2025-03-04
Payer: MEDICARE

## 2025-03-04 ENCOUNTER — HOSPITAL ENCOUNTER (OUTPATIENT)
Dept: GENERAL RADIOLOGY | Facility: CLINIC | Age: 65
Discharge: HOME | End: 2025-03-06
Payer: MEDICARE

## 2025-03-04 VITALS
OXYGEN SATURATION: 98 % | BODY MASS INDEX: 28.41 KG/M2 | SYSTOLIC BLOOD PRESSURE: 126 MMHG | HEART RATE: 63 BPM | DIASTOLIC BLOOD PRESSURE: 68 MMHG | WEIGHT: 198 LBS

## 2025-03-04 DIAGNOSIS — Z13.6 ENCOUNTER FOR ABDOMINAL AORTIC ANEURYSM (AAA) SCREENING: ICD-10-CM

## 2025-03-04 DIAGNOSIS — M54.50 CHRONIC RIGHT-SIDED LOW BACK PAIN WITHOUT SCIATICA: ICD-10-CM

## 2025-03-04 DIAGNOSIS — G89.29 HIP PAIN, CHRONIC, RIGHT: Primary | ICD-10-CM

## 2025-03-04 DIAGNOSIS — G89.29 CHRONIC RIGHT-SIDED LOW BACK PAIN WITHOUT SCIATICA: ICD-10-CM

## 2025-03-04 DIAGNOSIS — F11.90 CHRONIC, CONTINUOUS USE OF OPIOIDS: ICD-10-CM

## 2025-03-04 DIAGNOSIS — M25.551 HIP PAIN, CHRONIC, RIGHT: Primary | ICD-10-CM

## 2025-03-04 PROCEDURE — 72100 X-RAY EXAM L-S SPINE 2/3 VWS: CPT

## 2025-03-04 PROCEDURE — 1123F ACP DISCUSS/DSCN MKR DOCD: CPT | Performed by: STUDENT IN AN ORGANIZED HEALTH CARE EDUCATION/TRAINING PROGRAM

## 2025-03-04 PROCEDURE — 99214 OFFICE O/P EST MOD 30 MIN: CPT | Performed by: STUDENT IN AN ORGANIZED HEALTH CARE EDUCATION/TRAINING PROGRAM

## 2025-03-04 SDOH — ECONOMIC STABILITY: FOOD INSECURITY: WITHIN THE PAST 12 MONTHS, THE FOOD YOU BOUGHT JUST DIDN'T LAST AND YOU DIDN'T HAVE MONEY TO GET MORE.: NEVER TRUE

## 2025-03-04 SDOH — ECONOMIC STABILITY: FOOD INSECURITY: WITHIN THE PAST 12 MONTHS, YOU WORRIED THAT YOUR FOOD WOULD RUN OUT BEFORE YOU GOT MONEY TO BUY MORE.: NEVER TRUE

## 2025-03-04 ASSESSMENT — PATIENT HEALTH QUESTIONNAIRE - PHQ9
SUM OF ALL RESPONSES TO PHQ QUESTIONS 1-9: 0
1. LITTLE INTEREST OR PLEASURE IN DOING THINGS: NOT AT ALL
SUM OF ALL RESPONSES TO PHQ QUESTIONS 1-9: 0
2. FEELING DOWN, DEPRESSED OR HOPELESS: NOT AT ALL
SUM OF ALL RESPONSES TO PHQ QUESTIONS 1-9: 0
SUM OF ALL RESPONSES TO PHQ QUESTIONS 1-9: 0

## 2025-03-04 NOTE — ASSESSMENT & PLAN NOTE
History of chronic lower back pain which had also started 5 years ago without sciatica   -has been hindering lifestyle and difficulty with ambulation   -history of MRI of thoracic spine done in 2021 showing right paracentral disc bulge and osteophyte at T6-T7 which enroaches on the righ tventrolateral throacic spinal cord  Currently symptoms of lower back pain without radiation with worsening symptoms   Will order XR and follow up  Consider physical therapy       Orders:    XR LUMBAR SPINE (2-3 VIEWS); Future

## 2025-03-04 NOTE — PROGRESS NOTES
around 5 years now but has been getting worse, when he first started out with the back pain has had it for 5 years, but per patient has been hindering his lifestyle and making it difficult to do activities, does use a cane to ambulate. Denies any recent falls, trauma or injuries         Review of Systems       Objective   Physical Exam  Vitals reviewed.   Constitutional:       Appearance: Normal appearance.   HENT:      Head: Normocephalic and atraumatic.   Cardiovascular:      Rate and Rhythm: Normal rate and regular rhythm.      Pulses: Normal pulses.      Heart sounds: Normal heart sounds.   Pulmonary:      Effort: Pulmonary effort is normal.      Breath sounds: Normal breath sounds.   Musculoskeletal:         General: Tenderness (difficulty with hip range of motion due to pain) present.   Skin:     General: Skin is warm.      Capillary Refill: Capillary refill takes less than 2 seconds.   Neurological:      General: No focal deficit present.      Mental Status: He is alert and oriented to person, place, and time. Mental status is at baseline.   Psychiatric:         Mood and Affect: Mood normal.         Behavior: Behavior normal.         Thought Content: Thought content normal.         Judgment: Judgment normal.                  An electronic signature was used to authenticate this note.    --Alondra Burgos MD

## 2025-03-05 PROBLEM — F11.90 CHRONIC, CONTINUOUS USE OF OPIOIDS: Status: ACTIVE | Noted: 2025-03-05

## 2025-03-05 PROBLEM — M25.551 HIP PAIN, CHRONIC, RIGHT: Status: ACTIVE | Noted: 2025-03-05

## 2025-03-05 PROBLEM — G89.29 HIP PAIN, CHRONIC, RIGHT: Status: ACTIVE | Noted: 2025-03-05

## 2025-03-05 NOTE — ASSESSMENT & PLAN NOTE
Due to history of back and hip pain, prescribed due to be able to do daily activities otherwise hinders lifestyle   Controlled substance agreement signed   Swabbed last time was normal  Will order oral drug swab due to patient having difficulty with urinating today   Will follow up

## 2025-03-05 NOTE — ASSESSMENT & PLAN NOTE
Ongoing right hip pain that has been getting worse, per patient had started 5 years ago and had addressed it to his PCP, previously had recommended surgery but had refused at that time saw ortho in the past and was started on Norco since then  -pain worsening of right hip, XR done in 2024 showing advanced degenerative changes   Will refer to ortho for further evaluation and assessment     Orders:    Harris Rees MD, Orthopaedic Surgery, [place]

## 2025-03-06 NOTE — RESULT ENCOUNTER NOTE
It looks like he does have arthritis changes in his back but also some loss of disc height in his spine of his lower back, and it does show some severe changes of his left and right hip but left being worse, so I would like him to follow up with orthopedics for further assessment and recommendations, I gave him the referral previous visit, hopefully he was able to set that up

## 2025-03-10 ENCOUNTER — TELEPHONE (OUTPATIENT)
Dept: FAMILY MEDICINE CLINIC | Age: 65
End: 2025-03-10

## 2025-03-10 DIAGNOSIS — G89.29 HIP PAIN, CHRONIC, RIGHT: Primary | ICD-10-CM

## 2025-03-10 DIAGNOSIS — M25.551 HIP PAIN, CHRONIC, RIGHT: Primary | ICD-10-CM

## 2025-03-10 NOTE — TELEPHONE ENCOUNTER
Sister contacted office stating that Dr. Chandra's office does not accept patient's insurance. She is asking for new referral elsewhere.    Referral sent to [facility]

## 2025-03-11 DIAGNOSIS — F11.90 CHRONIC, CONTINUOUS USE OF OPIOIDS: ICD-10-CM

## 2025-03-12 DIAGNOSIS — G89.29 CHRONIC PAIN OF BOTH HIPS: Primary | ICD-10-CM

## 2025-03-12 DIAGNOSIS — M25.551 CHRONIC PAIN OF BOTH HIPS: Primary | ICD-10-CM

## 2025-03-12 DIAGNOSIS — M25.552 CHRONIC PAIN OF BOTH HIPS: Primary | ICD-10-CM

## 2025-03-13 ENCOUNTER — OFFICE VISIT (OUTPATIENT)
Dept: ORTHOPEDIC SURGERY | Age: 65
End: 2025-03-13

## 2025-03-13 VITALS — BODY MASS INDEX: 28.35 KG/M2 | WEIGHT: 198 LBS | OXYGEN SATURATION: 100 % | RESPIRATION RATE: 14 BRPM | HEIGHT: 70 IN

## 2025-03-13 DIAGNOSIS — M16.11 ARTHRITIS OF RIGHT HIP: Primary | ICD-10-CM

## 2025-03-13 DIAGNOSIS — M16.12 ARTHRITIS OF LEFT HIP: ICD-10-CM

## 2025-03-13 DIAGNOSIS — G89.29 CHRONIC RIGHT HIP PAIN: ICD-10-CM

## 2025-03-13 DIAGNOSIS — M25.551 CHRONIC RIGHT HIP PAIN: ICD-10-CM

## 2025-03-13 ASSESSMENT — ENCOUNTER SYMPTOMS
COLOR CHANGE: 0
BACK PAIN: 0

## 2025-03-13 NOTE — PROGRESS NOTES
[facility] PHYSICIANS [facility] ORTHOPEDICS AND SPORTS MEDICINE  [address]  [place] [id number]  Dept: [phone]    Ambulatory Orthopedic New Patient Visit      CHIEF COMPLAINT:    Chief Complaint   Patient presents with    Hip Pain     Right hip pain, chronic, no acute injury.        HISTORY OF PRESENT ILLNESS:      History of Present Illness  The patient is a 64-year-old male who presents for evaluation of right hip pain. He is accompanied by his sister.    He has been experiencing persistent right hip pain for over a decade, which he attributes to his cessation of work. The pain is localized to the right hip without radiation to the groin. He reports no recent falls or injuries. He has been using a cane for several years. He has a history of lumbar strain but has not undergone any lower back surgeries. He does not experience any numbness or tingling down his leg. He reports no history of avascular necrosis, chronic steroid use, or alcoholism, although he used to consume beer but has since stopped. He reports no kidney issues, gastric ulcers, or diverticulitis. He is not diabetic and does not smoke. He has not engaged in physical therapy for his hip but has received massages from a therapy group. His sleep is not significantly disrupted by the pain. He has not consulted a dentist in the past 6 months. Prolonged walking exacerbates the pain. He has been on Norco 7.5 mg twice daily for approximately 30 to 40 years, initially prescribed for shoulder pain. He also takes two Aleve tablets daily with the Norco.    He does feel like his level of activity is greatly affected from the hip pain and that he is mostly sedentary because of this.    Supplemental Information  He had a valve replacement about 3 years ago and sees a cardiologist every 6 months. He is on aspirin 81 mg daily.    SOCIAL HISTORY  He does not smoke. He used to drink beer but does not

## 2025-03-25 ENCOUNTER — RESULTS FOLLOW-UP (OUTPATIENT)
Dept: FAMILY MEDICINE CLINIC | Age: 65
End: 2025-03-25

## 2025-03-25 ENCOUNTER — HOSPITAL ENCOUNTER (OUTPATIENT)
Dept: VASCULAR LAB | Age: 65
Discharge: HOME | End: 2025-03-27
Attending: STUDENT IN AN ORGANIZED HEALTH CARE EDUCATION/TRAINING PROGRAM
Payer: MEDICARE

## 2025-03-25 DIAGNOSIS — M54.32 SCIATICA OF LEFT SIDE: ICD-10-CM

## 2025-03-25 DIAGNOSIS — Z13.6 ENCOUNTER FOR ABDOMINAL AORTIC ANEURYSM (AAA) SCREENING: ICD-10-CM

## 2025-03-25 DIAGNOSIS — G89.29 HIP PAIN, CHRONIC, RIGHT: ICD-10-CM

## 2025-03-25 DIAGNOSIS — M25.551 HIP PAIN, CHRONIC, RIGHT: ICD-10-CM

## 2025-03-25 LAB
VAS AORTA DIST AP: 1.82 CM
VAS AORTA DIST PSV: 129 CM/S
VAS AORTA DIST TR: 1.82 CM
VAS AORTA INFRARENAL PSV: 82.6 CM/S
VAS AORTA MID AP: 1.93 CM
VAS AORTA MID PSV: 82.6 CM/S
VAS AORTA MID TRANS: 2.02 CM
VAS AORTA PROX AP: 1.96 CM
VAS AORTA PROX PSV: 115 CM/S
VAS AORTA PROX TR: 1.94 CM
VAS AORTA SUPRARENAL PSV: 115 CM/S
VAS LEFT COM ILIAC AP: 1.17 CM
VAS LEFT COM ILIAC PROX PSV: 116 CM/S
VAS LEFT COM ILIAC TRANS: 1.15 CM
VAS RIGHT COM ILIAC AP: 1.2 CM
VAS RIGHT COM ILIAC PROX PSV: 145 CM/S
VAS RIGHT COM ILIAC TRANS: 1.2 CM

## 2025-03-25 PROCEDURE — 76706 US ABDL AORTA SCREEN AAA: CPT | Performed by: SURGERY

## 2025-03-25 PROCEDURE — 76706 US ABDL AORTA SCREEN AAA: CPT

## 2025-03-25 RX ORDER — HYDROCODONE BITARTRATE AND ACETAMINOPHEN 7.5; 325 MG/1; MG/1
1 TABLET ORAL 2 TIMES DAILY
Qty: 60 TABLET | Refills: 0 | Status: SHIPPED | OUTPATIENT
Start: 2025-03-25 | End: 2025-04-24

## 2025-03-25 NOTE — TELEPHONE ENCOUNTER
Patient's sister contacted office. States patient is due for his pain medication.    Last visit: 3/4/25  Last Med refill: 2/26/25  Does patient have enough medication for 72 hours: Yes    Next Visit Date:  Future Appointments   Date Time Provider Department Center   3/31/2025  8:30 AM Jose Varela MD [facility]   6/4/2025  9:00 AM Alondra Burgos MD [facility] DEP   8/18/2025 10:45 AM Evelin Cummings MD [facility]       Health Maintenance   Topic Date Due    DTaP/Tdap/Td vaccine (1 - Tdap) Never done    AAA screen  12/18/2024    Annual Wellness Visit (Medicare)  Never done    Shingles vaccine (1 of 2) 12/03/2025 (Originally 12/18/2009)    COVID-19 Vaccine (3 - 2024-25 season) 12/03/2025 (Originally 9/1/2024)    Respiratory Syncytial Virus (RSV) Pregnant or age 60 yrs+ (1 - Risk 60-74 years 1-dose series) 12/03/2025 (Originally 12/18/2019)    A1C test (Diabetic or Prediabetic)  07/02/2025    Lipids  07/02/2025    Lung Cancer Screening &/or Counseling  11/15/2025    Colorectal Cancer Screen  12/14/2025    Depression Screen  03/04/2026    Flu vaccine  Completed    Pneumococcal 50+ years Vaccine  Completed    Hepatitis A vaccine  Aged Out    Hepatitis B vaccine  Aged Out    Hib vaccine  Aged Out    Polio vaccine  Aged Out    Meningococcal (ACWY) vaccine  Aged Out    Meningococcal B vaccine  Aged Out    Pneumococcal 0-49 years Vaccine  Discontinued    Diabetes screen  Discontinued    Hepatitis C screen  Discontinued    HIV screen  Discontinued    Prostate Specific Antigen (PSA) Screening or Monitoring  Discontinued       Hemoglobin A1C (%)   Date Value   07/02/2024 5.7   04/10/2024 5.9   05/05/2023 5.6             ( goal A1C is < 7)   No components found for: \"LABMICR\"  No components found for: \"LDLCHOLESTEROL\", \"LDLCALC\"    (goal LDL is <100)   AST (U/L)   Date Value   07/02/2024 47 (H)     ALT (U/L)   Date Value   07/02/2024 36     BUN (MG/DL)   Date Value   07/02/2024 23 (H)     BP

## 2025-03-31 ENCOUNTER — OFFICE VISIT (OUTPATIENT)
Dept: ORTHOPEDIC SURGERY | Age: 65
End: 2025-03-31

## 2025-03-31 ENCOUNTER — PREP FOR PROCEDURE (OUTPATIENT)
Dept: ORTHOPEDIC SURGERY | Age: 65
End: 2025-03-31

## 2025-03-31 VITALS — HEIGHT: 70 IN | RESPIRATION RATE: 18 BRPM | BODY MASS INDEX: 28.35 KG/M2 | WEIGHT: 198 LBS

## 2025-03-31 DIAGNOSIS — M16.11 ARTHRITIS OF RIGHT HIP: Primary | ICD-10-CM

## 2025-03-31 NOTE — PROGRESS NOTES
[facility]                   Jose Varela M.D.            [address], [address]               [place], [place] [address]           Dept Phone: [phone]           Dept Fax:  [phone] [address]                       [address]           [place], [place] [address]          Dept Phone: [phone]           Dept Fax:  [phone]      Chief Compliant:  Chief Complaint   Patient presents with    Pain     RT HIP        History of Present Illness:  This is a 64 y.o. male who presents to the clinic today for evaluation / follow up of severe right hip pain.  Patient is a 64-year-old retired gentleman who was referred by Nelly for severe right hip pain.  Patient is been dealing this for years.  He utilizes a single prong cane.  He has not been able to tie his shoes for years.  He has noted that he does have a leg length discrepancy however it is left shorter than right however his left side does not bother him..       Review of Systems   Constitutional: Negative for fever, chills, sweats.   Eyes: Negative for changes in vision, or pain.   HENT: Negative for ear ache, epistaxis, or sore throat.  Respiratory/Cardio: Negative for Chest pain, palpitations, SOB, or cough.  Gastrointestinal: Negative for abdominal pain, N/V/D.   Genitourinary: Negative for dysuria, frequency, urgency, or hematuria.   Neurological: Negative for headache, numbness, or weakness.   Integumentary: Negative for rash, itching, laceration, or abrasion.   Musculoskeletal: Positive for Pain (RT HIP)       Physical Exam:  Constitutional: Patient is oriented to person, place, and time. Patient appears well-developed and well nourished.   HENT: Negative otherwise noted  Head: Normocephalic and Atraumatic  Nose: Normal  Eyes: Conjunctivae and EOM are normal  Neck: Normal range of motion Neck supple.    Respiratory/Cardio: Effort normal. No respiratory distress.  Musculoskeletal: Examination

## 2025-04-23 DIAGNOSIS — M54.32 SCIATICA OF LEFT SIDE: ICD-10-CM

## 2025-04-23 DIAGNOSIS — G89.29 HIP PAIN, CHRONIC, RIGHT: ICD-10-CM

## 2025-04-23 DIAGNOSIS — M25.551 HIP PAIN, CHRONIC, RIGHT: ICD-10-CM

## 2025-04-23 NOTE — TELEPHONE ENCOUNTER
Last visit: 3/4/25  Last Med refill: 3/25/25  Does patient have enough medication for 72 hours: NO    Next Visit Date:  Future Appointments   Date Time Provider Department Center   5/6/2025  9:30 AM INO PEREZ RM 1 [facility]   5/13/2025  9:00 AM Alondra Burgos MD [facility] DEP   6/2/2025  9:20 AM Jose Varela MD [facility]   6/4/2025  9:00 AM Alondra Burgos MD [facility] DEP   8/18/2025 10:45 AM Evelin Cummings MD [facility]       Health Maintenance   Topic Date Due    DTaP/Tdap/Td vaccine (1 - Tdap) Never done    Annual Wellness Visit (Medicare)  Never done    Shingles vaccine (1 of 2) 12/03/2025 (Originally 12/18/2009)    COVID-19 Vaccine (3 - 2024-25 season) 12/03/2025 (Originally 9/1/2024)    Respiratory Syncytial Virus (RSV) Pregnant or age 60 yrs+ (1 - Risk 60-74 years 1-dose series) 12/03/2025 (Originally 12/18/2019)    A1C test (Diabetic or Prediabetic)  07/02/2025    Lipids  07/02/2025    Lung Cancer Screening &/or Counseling  11/15/2025    Colorectal Cancer Screen  12/14/2025    Depression Screen  03/04/2026    Flu vaccine  Completed    Pneumococcal 50+ years Vaccine  Completed    AAA screen  Completed    Hepatitis A vaccine  Aged Out    Hepatitis B vaccine  Aged Out    Hib vaccine  Aged Out    Polio vaccine  Aged Out    Meningococcal (ACWY) vaccine  Aged Out    Meningococcal B vaccine  Aged Out    Pneumococcal 0-49 years Vaccine  Discontinued    Diabetes screen  Discontinued    Hepatitis C screen  Discontinued    HIV screen  Discontinued    Prostate Specific Antigen (PSA) Screening or Monitoring  Discontinued       Hemoglobin A1C (%)   Date Value   07/02/2024 5.7   04/10/2024 5.9   05/05/2023 5.6             ( goal A1C is < 7)   No components found for: \"LABMICR\"  No components found for: \"LDLCHOLESTEROL\", \"LDLCALC\"    (goal LDL is <100)   AST (U/L)   Date Value   07/02/2024 47 (H)     ALT (U/L)   Date Value   07/02/2024 36     BUN (MG/DL)   Date

## 2025-04-24 RX ORDER — HYDROCODONE BITARTRATE AND ACETAMINOPHEN 7.5; 325 MG/1; MG/1
1 TABLET ORAL 2 TIMES DAILY
Qty: 60 TABLET | Refills: 0 | Status: SHIPPED | OUTPATIENT
Start: 2025-04-24 | End: 2025-05-24

## 2025-05-05 NOTE — H&P
HISTORY and PHYSICAL  [facility]       NAME:  Victor Hugo Lama  MRN: [id number]   YOB: 1960   Date: 5/6/2025   Age: 64 y.o.  Gender: male       COMPLAINT AND PRESENT HISTORY:     Victor Hugo Lama is 64 y.o.  male, here for preadmission testing related to osteoarthritis of right hip with scheduled HIP TOTAL ARTHROPLASTY MINIAMALLY INVASIVE ASI RIGHT per Dr. Varela.    Below italics a portion of office visit note by Dr. Varela dated 03/31/25: Reviewed   History of Present Illness:  This is a 64 y.o. male who presents to the clinic today for evaluation / follow up of severe right hip pain.  Patient is a 64-year-old retired gentleman who was referred by Nelly for severe right hip pain.  Patient is been dealing this for years.  He utilizes a single prong cane.  He has not been able to tie his shoes for years.  He has noted that he does have a leg length discrepancy however it is left shorter than right however his left side does not bother him..        UPDATE  Symptoms started years ago.  Pt c/o pain to right buttocks, lateral aspect of right hip.  Describes pain as intermittent.  Rating pain 8/10.  Takes norco, aleve with minimal relief.  Walking aggravates pain.  Nothing in particular helps alleviate symptoms.  Pain does not radiate.   Has occasional numbness and tingling to right hip down into right knee.  Denies recent fall, injury or trauma.  Denies redness or rash to surgical site.   Denies hx of MRSA infection.      PMHx includes:    Sees Dr. Burgos for PCP with last visit in March, 2025.    CHF, HLD, HTN, murmur, s/p cardiac cath, s/p aortic valve replacement:  Associated medications include:losartan, lipitor, fenofibrate, lasix, coreg, low dose aspirin  Denies recent or current chest pain/pressure, palpitations, SOB, headaches, dizziness, lower extremity edema.   Sees [facility] for cardiology with last visit with Theo Kenny CNP on 02/26/25.     BP Readings from Last 3 Encounters:

## 2025-05-05 NOTE — H&P (VIEW-ONLY)
HISTORY and PHYSICAL  [facility]       NAME:  Victor Hugo Lama  MRN: [id number]   YOB: 1960   Date: 5/6/2025   Age: 64 y.o.  Gender: male       COMPLAINT AND PRESENT HISTORY:     Victor Hugo Lama is 64 y.o.  male, here for preadmission testing related to osteoarthritis of right hip with scheduled HIP TOTAL ARTHROPLASTY MINIAMALLY INVASIVE ASI RIGHT per Dr. Varela.    Below italics a portion of office visit note by Dr. Varela dated 03/31/25: Reviewed   History of Present Illness:  This is a 64 y.o. male who presents to the clinic today for evaluation / follow up of severe right hip pain.  Patient is a 64-year-old retired gentleman who was referred by Nelly for severe right hip pain.  Patient is been dealing this for years.  He utilizes a single prong cane.  He has not been able to tie his shoes for years.  He has noted that he does have a leg length discrepancy however it is left shorter than right however his left side does not bother him..        UPDATE  Symptoms started years ago.  Pt c/o pain to right buttocks, lateral aspect of right hip.  Describes pain as intermittent.  Rating pain 8/10.  Takes norco, aleve with minimal relief.  Walking aggravates pain.  Nothing in particular helps alleviate symptoms.  Pain does not radiate.   Has occasional numbness and tingling to right hip down into right knee.  Denies recent fall, injury or trauma.  Denies redness or rash to surgical site.   Denies hx of MRSA infection.      PMHx includes:    Sees Dr. Burgos for PCP with last visit in March, 2025.    CHF, HLD, HTN, murmur, s/p cardiac cath, s/p aortic valve replacement:  Associated medications include:losartan, lipitor, fenofibrate, lasix, coreg, low dose aspirin  Denies recent or current chest pain/pressure, palpitations, SOB, headaches, dizziness, lower extremity edema.   Sees [facility] for cardiology with last visit with Theo Kenny CNP on 02/26/25.     BP Readings from Last 3 Encounters:

## 2025-05-06 ENCOUNTER — TELEPHONE (OUTPATIENT)
Dept: ORTHOPEDIC SURGERY | Age: 65
End: 2025-05-06

## 2025-05-06 ENCOUNTER — HOSPITAL ENCOUNTER (OUTPATIENT)
Dept: PREADMISSION TESTING | Age: 65
Discharge: HOME | End: 2025-05-10
Attending: ORTHOPAEDIC SURGERY | Admitting: ORTHOPAEDIC SURGERY
Payer: MEDICARE

## 2025-05-06 VITALS
HEIGHT: 71 IN | SYSTOLIC BLOOD PRESSURE: 141 MMHG | WEIGHT: 201 LBS | TEMPERATURE: 98.2 F | OXYGEN SATURATION: 98 % | HEART RATE: 70 BPM | BODY MASS INDEX: 28.14 KG/M2 | RESPIRATION RATE: 18 BRPM | DIASTOLIC BLOOD PRESSURE: 61 MMHG

## 2025-05-06 LAB
ABO + RH BLD: NORMAL
ANION GAP SERPL CALCULATED.3IONS-SCNC: 10 MMOL/L (ref 9–16)
ARM BAND NUMBER: NORMAL
BACTERIA URNS QL MICRO: ABNORMAL
BASOPHILS # BLD: 0 K/UL (ref 0–0.2)
BASOPHILS NFR BLD: 0 % (ref 0–2)
BILIRUB UR QL STRIP: NEGATIVE
BLOOD BANK SAMPLE EXPIRATION: NORMAL
BLOOD GROUP ANTIBODIES SERPL: NEGATIVE
BUN SERPL-MCNC: 28 MG/DL (ref 8–23)
CALCIUM SERPL-MCNC: 9.7 MG/DL (ref 8.6–10.4)
CASTS #/AREA URNS LPF: ABNORMAL /LPF
CHLORIDE SERPL-SCNC: 104 MMOL/L (ref 98–107)
CLARITY UR: CLEAR
CO2 SERPL-SCNC: 22 MMOL/L (ref 20–31)
COLOR UR: YELLOW
CREAT SERPL-MCNC: 1.5 MG/DL (ref 0.7–1.2)
EOSINOPHIL # BLD: 0.2 K/UL (ref 0–0.4)
EOSINOPHILS RELATIVE PERCENT: 2 % (ref 0–4)
ERYTHROCYTE [DISTWIDTH] IN BLOOD BY AUTOMATED COUNT: 15.1 % (ref 11.5–14.9)
GFR, ESTIMATED: 51 ML/MIN/1.73M2
GLUCOSE SERPL-MCNC: 127 MG/DL (ref 74–99)
GLUCOSE UR STRIP-MCNC: NEGATIVE MG/DL
HCT VFR BLD AUTO: 35.2 % (ref 41–53)
HGB BLD-MCNC: 11.8 G/DL (ref 13.5–17.5)
HGB UR QL STRIP.AUTO: NEGATIVE
KETONES UR STRIP-MCNC: ABNORMAL MG/DL
LEUKOCYTE ESTERASE UR QL STRIP: NEGATIVE
LYMPHOCYTES NFR BLD: 1.2 K/UL (ref 1–4.8)
LYMPHOCYTES RELATIVE PERCENT: 17 % (ref 24–44)
MCH RBC QN AUTO: 30.7 PG (ref 26–34)
MCHC RBC AUTO-ENTMCNC: 33.5 G/DL (ref 31–37)
MCV RBC AUTO: 91.7 FL (ref 80–100)
MONOCYTES NFR BLD: 0.5 K/UL (ref 0.1–1.3)
MONOCYTES NFR BLD: 7 % (ref 1–7)
NEUTROPHILS NFR BLD: 74 % (ref 36–66)
NEUTS SEG NFR BLD: 5.1 K/UL (ref 1.3–9.1)
NITRITE UR QL STRIP: NEGATIVE
PH UR STRIP: 5 [PH] (ref 5–8)
PLATELET # BLD AUTO: 287 K/UL (ref 150–450)
PMV BLD AUTO: 10.1 FL (ref 6–12)
POTASSIUM SERPL-SCNC: 4.4 MMOL/L (ref 3.7–5.3)
PROT UR STRIP-MCNC: ABNORMAL MG/DL
RBC # BLD AUTO: 3.84 M/UL (ref 4.5–5.9)
RBC #/AREA URNS HPF: ABNORMAL /HPF
SODIUM SERPL-SCNC: 136 MMOL/L (ref 136–145)
SP GR UR STRIP: 1.02 (ref 1–1.03)
UROBILINOGEN UR STRIP-ACNC: NORMAL EU/DL (ref 0–1)
WBC #/AREA URNS HPF: ABNORMAL /HPF
WBC OTHER # BLD: 7.1 K/UL (ref 3.5–11)

## 2025-05-06 PROCEDURE — 86901 BLOOD TYPING SEROLOGIC RH(D): CPT

## 2025-05-06 PROCEDURE — 86850 RBC ANTIBODY SCREEN: CPT

## 2025-05-06 PROCEDURE — 36415 COLL VENOUS BLD VENIPUNCTURE: CPT

## 2025-05-06 PROCEDURE — 80048 BASIC METABOLIC PNL TOTAL CA: CPT

## 2025-05-06 PROCEDURE — 87641 MR-STAPH DNA AMP PROBE: CPT

## 2025-05-06 PROCEDURE — APPSS45 APP SPLIT SHARED TIME 31-45 MINUTES: Performed by: NURSE PRACTITIONER

## 2025-05-06 PROCEDURE — 86900 BLOOD TYPING SEROLOGIC ABO: CPT

## 2025-05-06 PROCEDURE — 81001 URINALYSIS AUTO W/SCOPE: CPT

## 2025-05-06 PROCEDURE — 85025 COMPLETE CBC W/AUTO DIFF WBC: CPT

## 2025-05-06 ASSESSMENT — ENCOUNTER SYMPTOMS
VOMITING: 0
CONSTIPATION: 0
ABDOMINAL PAIN: 0
COUGH: 0
DIARRHEA: 0
SHORTNESS OF BREATH: 0
NAUSEA: 0
SORE THROAT: 0

## 2025-05-06 NOTE — TELEPHONE ENCOUNTER
Patient's sister, who is on 11/1/24 communication release of information, called office inquiring the purpose of pre-admission testing for patient's upcoming surgery. I explained that is similar to a history and physical that will be completed. Sister also mentioned that the patient's insurance recently changed. I suggested that the patient inform the staff during his pre-admission testing appointment later today of the change and to provide the new insurance card if possible. Sister voiced understanding.

## 2025-05-06 NOTE — DISCHARGE INSTRUCTIONS
Pre-op Instructions For Out-Patient Surgery    Medication Instructions:  Please stop herbs and any supplements now (includes vitamins and minerals).    For these medications:  Dulaglutide (Trulicity), Exenatide (Byetta and Bydureon, Liraglutide (Victoza), Lixisenatide (Adlyxin), Semaglutide (Ozempic and Rybelsus), Tirzepatide (Mounjaro, Zepbound)- Stop 1 week prior if taking weekly or 1 day prior if taking every 12 hours or daily.     Please contact your surgeon and prescribing physician for pre-op instructions for any blood thinners. Stop Aspirin as directed    If you have inhalers/aerosol treatments at home, please use them the morning of your surgery and bring the inhalers with you to the hospital.    Please take the following medications the morning of your surgery with a sip of water:    Carvedilol, Levothyroxine    Surgery Instructions:  After midnight before surgery:  Do not eat or drink anything, including water, mints, gum, and hard candy.  You may brush your teeth without swallowing.  No smoking, chewing tobacco, or street drugs.    Please shower or bathe before surgery.  If you were given Surgical Scrub Chlorhexidine Gluconate Liquid (CHG), please shower the night before and the morning of your surgery following the detailed instructions you received during your pre-admission visit.     Please do not wear any cologne, lotion, powder, deodorant, jewelry, piercings, perfume, makeup, nail polish, hair accessories, or hair spray on the day of surgery.  Wear loose comfortable clothing.    Leave your valuables at home but bring a payment source for any after-surgery prescriptions you plan to fill at [facility].  Bring a storage case for any glasses/contacts.    An adult who is responsible for you MUST drive you home and should be with you for the first 24 hours after surgery.     If having out-patient knee and foot surgeries, please arrange for planned crutches, walker, or

## 2025-05-07 LAB
MRSA, DNA, NASAL: NEGATIVE
SPECIMEN DESCRIPTION: NORMAL

## 2025-05-08 ENCOUNTER — CASE MANAGEMENT (OUTPATIENT)
Dept: CASE MANAGEMENT | Age: 65
End: 2025-05-08

## 2025-05-08 NOTE — PROGRESS NOTES
[facility] Joint Replacement Pre-surgical Assessment    Scheduled Surgery Date: 05/20/2025  Surgery Time: 0730    Surgeon: SELENE  Procedure: right Total Hip    Primary Insurance Coverage [employer] MEDICARE  Pre-op class attended YES 05/08/2025    PCP: Alondra Burgos MD  Clearance received by PCP: ?    Anticipated Discharge Plan: home  Agency (if applicable): [facility]    Significant PMH: SEE HX    Smoking history: none    Alcohol history: Never drinks    Concerns prior to surgery: PT HAS A FWW.    Electronically signed by: PRETTY MARS RN on 5/8/2025 at 1:07 PM

## 2025-05-13 ENCOUNTER — HOSPITAL ENCOUNTER (OUTPATIENT)
Age: 65
Setting detail: SPECIMEN
Discharge: HOME | End: 2025-05-13

## 2025-05-13 ENCOUNTER — OFFICE VISIT (OUTPATIENT)
Dept: FAMILY MEDICINE CLINIC | Age: 65
End: 2025-05-13
Payer: MEDICARE

## 2025-05-13 VITALS
HEART RATE: 68 BPM | BODY MASS INDEX: 27.67 KG/M2 | OXYGEN SATURATION: 96 % | WEIGHT: 197 LBS | SYSTOLIC BLOOD PRESSURE: 132 MMHG | DIASTOLIC BLOOD PRESSURE: 76 MMHG

## 2025-05-13 DIAGNOSIS — Z01.810 ENCOUNTER FOR PRE-OPERATIVE CARDIOVASCULAR CLEARANCE: ICD-10-CM

## 2025-05-13 DIAGNOSIS — M16.11 PRIMARY OSTEOARTHRITIS OF RIGHT HIP: Primary | ICD-10-CM

## 2025-05-13 DIAGNOSIS — D64.9 NORMOCYTIC ANEMIA: ICD-10-CM

## 2025-05-13 DIAGNOSIS — I50.42 CHRONIC COMBINED SYSTOLIC (CONGESTIVE) AND DIASTOLIC (CONGESTIVE) HEART FAILURE (HCC): ICD-10-CM

## 2025-05-13 DIAGNOSIS — J41.0 SIMPLE CHRONIC BRONCHITIS (HCC): ICD-10-CM

## 2025-05-13 DIAGNOSIS — R56.9 SEIZURE (HCC): ICD-10-CM

## 2025-05-13 LAB
FERRITIN SERPL-MCNC: 57 NG/ML
FOLATE SERPL-MCNC: >40 NG/ML (ref 4.8–24.2)
IRON SATN MFR SERPL: 19 % (ref 20–55)
IRON SERPL-MCNC: 81 UG/DL (ref 61–157)
TIBC SERPL-MCNC: 436 UG/DL (ref 250–450)
TRANSFERRIN SERPL-MCNC: 355 MG/DL (ref 200–360)
UNSATURATED IRON BINDING CAPACITY: 355 UG/DL (ref 112–347)
VIT B12 SERPL-MCNC: 339 PG/ML (ref 232–1245)

## 2025-05-13 PROCEDURE — 3017F COLORECTAL CA SCREEN DOC REV: CPT | Performed by: STUDENT IN AN ORGANIZED HEALTH CARE EDUCATION/TRAINING PROGRAM

## 2025-05-13 PROCEDURE — 3023F SPIROM DOC REV: CPT | Performed by: STUDENT IN AN ORGANIZED HEALTH CARE EDUCATION/TRAINING PROGRAM

## 2025-05-13 PROCEDURE — G8427 DOCREV CUR MEDS BY ELIG CLIN: HCPCS | Performed by: STUDENT IN AN ORGANIZED HEALTH CARE EDUCATION/TRAINING PROGRAM

## 2025-05-13 PROCEDURE — 1036F TOBACCO NON-USER: CPT | Performed by: STUDENT IN AN ORGANIZED HEALTH CARE EDUCATION/TRAINING PROGRAM

## 2025-05-13 PROCEDURE — G8419 CALC BMI OUT NRM PARAM NOF/U: HCPCS | Performed by: STUDENT IN AN ORGANIZED HEALTH CARE EDUCATION/TRAINING PROGRAM

## 2025-05-13 PROCEDURE — 99214 OFFICE O/P EST MOD 30 MIN: CPT | Performed by: STUDENT IN AN ORGANIZED HEALTH CARE EDUCATION/TRAINING PROGRAM

## 2025-05-13 PROCEDURE — 1123F ACP DISCUSS/DSCN MKR DOCD: CPT | Performed by: STUDENT IN AN ORGANIZED HEALTH CARE EDUCATION/TRAINING PROGRAM

## 2025-05-13 RX ORDER — POLYETHYLENE GLYCOL 3350 17 G/17G
17 POWDER, FOR SOLUTION ORAL DAILY
Qty: 510 G | Refills: 0 | Status: SHIPPED | OUTPATIENT
Start: 2025-05-13 | End: 2025-06-12

## 2025-05-13 RX ORDER — FERROUS SULFATE 325(65) MG
325 TABLET ORAL 2 TIMES DAILY
Qty: 180 TABLET | Refills: 1 | Status: SHIPPED | OUTPATIENT
Start: 2025-05-13

## 2025-05-13 RX ORDER — CEPHALEXIN 500 MG/1
500 CAPSULE ORAL 2 TIMES DAILY
Qty: 14 CAPSULE | Refills: 0 | Status: SHIPPED | OUTPATIENT
Start: 2025-05-13 | End: 2025-05-20

## 2025-05-13 NOTE — PROGRESS NOTES
Victor Hugo Lama (:  1960) is a 64 y.o. male,Established patient, here for evaluation of the following chief complaint(s):  Pre-op Exam (RT hip replacement- 25 has stopped all vitamins at this time)         Assessment & Plan  Encounter for pre-operative cardiovascular clearance   Currently scheduled for Total right hip surgery with Dr. Jose Varela   When: May 20th  Location: [facility]   Discussed with patient needs to hold off on Aspirin for 7 days   - needs cardiology clearance due to history of heart failure   15% risk of major cardiac event   3 points RCRI score   Discussed labs with patient  History of smoking and has cut down on alcohol use to 2 beers a week (history of alcohol abuse)        Primary osteoarthritis of right hip   Severe arthritis of right hip currently going in for total right hip replacement with Orthopedics for May 20th          Normocytic anemia   Normocytic anemia  Will start on iron supplements  Will check iron levels along with vitamin B12 and folate  Will follow up with results     Orders:    Iron and TIBC; Future    Ferritin; Future    Transferrin; Future    Vitamin B12 & Folate; Future    Seizure (HCC)   No recent seizures has not been on medication for years   Will need to obtain EEG at next visit and discuss further with patient   Currently stable          Chronic combined systolic (congestive) and diastolic (congestive) heart failure (HCC)   Currently following up with Dr. Rahul Tapia and managed by cardiology  Currently stable  Will need cardiology clearance prior to surgery          Simple chronic bronchitis (HCC)   Currently stable   Former smoker  Denies any wheezing, shortness of breath   Not currently on any inhalers            Return in about 1 month (around 2025).       Subjective   64 yr old here for pre surgery clearance for Total right hip replacement with Dr. Avery branch   When: may 20th, one week from today   Where: [facility]   Per patient

## 2025-05-16 ENCOUNTER — RESULTS FOLLOW-UP (OUTPATIENT)
Dept: FAMILY MEDICINE CLINIC | Age: 65
End: 2025-05-16

## 2025-05-16 NOTE — RESULT ENCOUNTER NOTE
Can we let him know that his iron levels are low so I do want him to continue to take the iron supplements. His folic acid is a little elevated so if he is taking folate or folic acid supplements, if he can hold off on them for a bit. We can discuss more at his next upcoming visit

## 2025-05-19 ENCOUNTER — ANESTHESIA EVENT (OUTPATIENT)
Dept: OPERATING ROOM | Age: 65
End: 2025-05-19
Payer: MEDICARE

## 2025-05-19 PROBLEM — F10.10 ALCOHOL ABUSE: Status: RESOLVED | Noted: 2021-07-12 | Resolved: 2025-05-19

## 2025-05-19 PROBLEM — R56.9 SEIZURE (HCC): Status: ACTIVE | Noted: 2025-05-19

## 2025-05-19 ASSESSMENT — ENCOUNTER SYMPTOMS: BACK PAIN: 1

## 2025-05-19 NOTE — ASSESSMENT & PLAN NOTE
Currently stable   Former smoker  Denies any wheezing, shortness of breath   Not currently on any inhalers

## 2025-05-19 NOTE — ASSESSMENT & PLAN NOTE
Currently following up with Dr. Kay Cardiology and managed by cardiology  Currently stable  Will need cardiology clearance prior to surgery

## 2025-05-19 NOTE — ASSESSMENT & PLAN NOTE
No recent seizures has not been on medication for years   Will need to obtain EEG at next visit and discuss further with patient   Currently stable

## 2025-05-19 NOTE — PRE-PROCEDURE INSTRUCTIONS
Nothing to eat after midnight. Y  Are you taking any blood thinners? When was the last day? N  Make sure to use Hibiclens prior to surgery.Y  Remove any jewelry and body piercings.Y  Do you wear glasses? If so, please bring a case to store them in.  Are you having any Covid symptoms?N  Do you have any new rashes, infections, etc. that we should be aware of?N  Do you have a ride home the day of surgery? It cannot be a cab or medical transportation.Y  Verify surgery time and what time to arrive at hospital.[id number]

## 2025-05-19 NOTE — ASSESSMENT & PLAN NOTE
Severe arthritis of right hip currently going in for total right hip replacement with Orthopedics for May 20th

## 2025-05-20 ENCOUNTER — ANESTHESIA (OUTPATIENT)
Dept: OPERATING ROOM | Age: 65
End: 2025-05-20
Payer: MEDICARE

## 2025-05-20 ENCOUNTER — APPOINTMENT (OUTPATIENT)
Dept: GENERAL RADIOLOGY | Age: 65
End: 2025-05-20
Attending: ORTHOPAEDIC SURGERY
Payer: MEDICARE

## 2025-05-20 ENCOUNTER — HOSPITAL ENCOUNTER (OUTPATIENT)
Age: 65
Discharge: HOME HEALTH | End: 2025-05-20
Attending: ORTHOPAEDIC SURGERY | Admitting: ORTHOPAEDIC SURGERY
Payer: MEDICARE

## 2025-05-20 VITALS
TEMPERATURE: 97.8 F | HEIGHT: 71 IN | SYSTOLIC BLOOD PRESSURE: 157 MMHG | WEIGHT: 201 LBS | RESPIRATION RATE: 16 BRPM | DIASTOLIC BLOOD PRESSURE: 62 MMHG | BODY MASS INDEX: 28.14 KG/M2 | HEART RATE: 80 BPM | OXYGEN SATURATION: 98 %

## 2025-05-20 DIAGNOSIS — M16.11 PRIMARY OSTEOARTHRITIS OF RIGHT HIP: Primary | ICD-10-CM

## 2025-05-20 PROCEDURE — 2709999900 HC NON-CHARGEABLE SUPPLY: Performed by: ORTHOPAEDIC SURGERY

## 2025-05-20 PROCEDURE — 6370000000 HC RX 637 (ALT 250 FOR IP): Performed by: ORTHOPAEDIC SURGERY

## 2025-05-20 PROCEDURE — 2580000003 HC RX 258: Performed by: ORTHOPAEDIC SURGERY

## 2025-05-20 PROCEDURE — 97530 THERAPEUTIC ACTIVITIES: CPT

## 2025-05-20 PROCEDURE — 2720000010 HC SURG SUPPLY STERILE: Performed by: ORTHOPAEDIC SURGERY

## 2025-05-20 PROCEDURE — 3700000001 HC ADD 15 MINUTES (ANESTHESIA): Performed by: ORTHOPAEDIC SURGERY

## 2025-05-20 PROCEDURE — 7100000000 HC PACU RECOVERY - FIRST 15 MIN: Performed by: ORTHOPAEDIC SURGERY

## 2025-05-20 PROCEDURE — 6360000002 HC RX W HCPCS: Performed by: ANESTHESIOLOGY

## 2025-05-20 PROCEDURE — 2500000003 HC RX 250 WO HCPCS: Performed by: ANESTHESIOLOGY

## 2025-05-20 PROCEDURE — 6370000000 HC RX 637 (ALT 250 FOR IP)

## 2025-05-20 PROCEDURE — 2580000003 HC RX 258: Performed by: ANESTHESIOLOGY

## 2025-05-20 PROCEDURE — 7100000001 HC PACU RECOVERY - ADDTL 15 MIN: Performed by: ORTHOPAEDIC SURGERY

## 2025-05-20 PROCEDURE — 3700000000 HC ANESTHESIA ATTENDED CARE: Performed by: ORTHOPAEDIC SURGERY

## 2025-05-20 PROCEDURE — C1776 JOINT DEVICE (IMPLANTABLE): HCPCS | Performed by: ORTHOPAEDIC SURGERY

## 2025-05-20 PROCEDURE — 3600000003 HC SURGERY LEVEL 3 BASE: Performed by: ORTHOPAEDIC SURGERY

## 2025-05-20 PROCEDURE — 97165 OT EVAL LOW COMPLEX 30 MIN: CPT

## 2025-05-20 PROCEDURE — 3600000013 HC SURGERY LEVEL 3 ADDTL 15MIN: Performed by: ORTHOPAEDIC SURGERY

## 2025-05-20 PROCEDURE — 6360000002 HC RX W HCPCS: Performed by: ORTHOPAEDIC SURGERY

## 2025-05-20 PROCEDURE — 97116 GAIT TRAINING THERAPY: CPT

## 2025-05-20 PROCEDURE — 2500000003 HC RX 250 WO HCPCS: Performed by: ORTHOPAEDIC SURGERY

## 2025-05-20 PROCEDURE — 97161 PT EVAL LOW COMPLEX 20 MIN: CPT

## 2025-05-20 DEVICE — IMPLANTABLE DEVICE
Type: IMPLANTABLE DEVICE | Site: HIP | Status: FUNCTIONAL
Brand: BIOLOX® OPTION

## 2025-05-20 DEVICE — IMPLANTABLE DEVICE
Type: IMPLANTABLE DEVICE | Site: HIP | Status: FUNCTIONAL
Brand: G7® VIVACIT-E®

## 2025-05-20 DEVICE — IMPLANTABLE DEVICE: Type: IMPLANTABLE DEVICE | Site: HIP | Status: FUNCTIONAL

## 2025-05-20 DEVICE — IMPLANTABLE DEVICE
Type: IMPLANTABLE DEVICE | Site: HIP | Status: FUNCTIONAL
Brand: BIOLOX® DELTA OPTION

## 2025-05-20 DEVICE — IMPLANTABLE DEVICE
Type: IMPLANTABLE DEVICE | Site: HIP | Status: FUNCTIONAL
Brand: TAPERLOC COMPLETE MICRO PRIMARY FEMORAL

## 2025-05-20 DEVICE — IMPLANTABLE DEVICE
Type: IMPLANTABLE DEVICE | Site: HIP | Status: FUNCTIONAL
Brand: G7® ACETABULAR SYSTEM

## 2025-05-20 RX ORDER — ACETAMINOPHEN 500 MG
1000 TABLET ORAL ONCE
Status: COMPLETED | OUTPATIENT
Start: 2025-05-20 | End: 2025-05-20

## 2025-05-20 RX ORDER — SODIUM CHLORIDE 0.9 % (FLUSH) 0.9 %
5-40 SYRINGE (ML) INJECTION PRN
Status: DISCONTINUED | OUTPATIENT
Start: 2025-05-20 | End: 2025-05-20 | Stop reason: HOSPADM

## 2025-05-20 RX ORDER — SCOPOLAMINE 1 MG/3D
1 PATCH, EXTENDED RELEASE TRANSDERMAL ONCE
Status: DISCONTINUED | OUTPATIENT
Start: 2025-05-20 | End: 2025-05-20

## 2025-05-20 RX ORDER — MEPERIDINE HYDROCHLORIDE 25 MG/ML
12.5 INJECTION INTRAMUSCULAR; INTRAVENOUS; SUBCUTANEOUS EVERY 5 MIN PRN
Status: DISCONTINUED | OUTPATIENT
Start: 2025-05-20 | End: 2025-05-20 | Stop reason: HOSPADM

## 2025-05-20 RX ORDER — OXYCODONE AND ACETAMINOPHEN 5; 325 MG/1; MG/1
1 TABLET ORAL EVERY 4 HOURS PRN
Qty: 42 TABLET | Refills: 0 | Status: SHIPPED | OUTPATIENT
Start: 2025-05-20 | End: 2025-05-27

## 2025-05-20 RX ORDER — OXYCODONE HYDROCHLORIDE 5 MG/1
5 TABLET ORAL EVERY 4 HOURS PRN
Status: DISCONTINUED | OUTPATIENT
Start: 2025-05-20 | End: 2025-05-20 | Stop reason: HOSPADM

## 2025-05-20 RX ORDER — CHLORHEXIDINE GLUCONATE ORAL RINSE 1.2 MG/ML
SOLUTION DENTAL
Status: COMPLETED
Start: 2025-05-20 | End: 2025-05-20

## 2025-05-20 RX ORDER — SODIUM CHLORIDE, SODIUM LACTATE, POTASSIUM CHLORIDE, CALCIUM CHLORIDE 600; 310; 30; 20 MG/100ML; MG/100ML; MG/100ML; MG/100ML
INJECTION, SOLUTION INTRAVENOUS
Status: DISCONTINUED | OUTPATIENT
Start: 2025-05-20 | End: 2025-05-20 | Stop reason: SDUPTHER

## 2025-05-20 RX ORDER — FENTANYL CITRATE 0.05 MG/ML
25 INJECTION, SOLUTION INTRAMUSCULAR; INTRAVENOUS EVERY 5 MIN PRN
Status: DISCONTINUED | OUTPATIENT
Start: 2025-05-20 | End: 2025-05-20 | Stop reason: HOSPADM

## 2025-05-20 RX ORDER — PROPOFOL 10 MG/ML
INJECTION, EMULSION INTRAVENOUS
Status: DISCONTINUED | OUTPATIENT
Start: 2025-05-20 | End: 2025-05-20 | Stop reason: SDUPTHER

## 2025-05-20 RX ORDER — SODIUM CHLORIDE 0.9 % (FLUSH) 0.9 %
5-40 SYRINGE (ML) INJECTION EVERY 12 HOURS SCHEDULED
Status: DISCONTINUED | OUTPATIENT
Start: 2025-05-20 | End: 2025-05-20 | Stop reason: HOSPADM

## 2025-05-20 RX ORDER — DIPHENHYDRAMINE HYDROCHLORIDE 50 MG/ML
12.5 INJECTION, SOLUTION INTRAMUSCULAR; INTRAVENOUS
Status: DISCONTINUED | OUTPATIENT
Start: 2025-05-20 | End: 2025-05-20 | Stop reason: HOSPADM

## 2025-05-20 RX ORDER — LABETALOL HYDROCHLORIDE 5 MG/ML
10 INJECTION, SOLUTION INTRAVENOUS
Status: DISCONTINUED | OUTPATIENT
Start: 2025-05-20 | End: 2025-05-20 | Stop reason: HOSPADM

## 2025-05-20 RX ORDER — SODIUM CHLORIDE 9 MG/ML
INJECTION, SOLUTION INTRAMUSCULAR; INTRAVENOUS; SUBCUTANEOUS PRN
Status: DISCONTINUED | OUTPATIENT
Start: 2025-05-20 | End: 2025-05-20 | Stop reason: ALTCHOICE

## 2025-05-20 RX ORDER — DEXAMETHASONE SODIUM PHOSPHATE 10 MG/ML
10 INJECTION, SOLUTION INTRAMUSCULAR; INTRAVENOUS ONCE
Status: COMPLETED | OUTPATIENT
Start: 2025-05-20 | End: 2025-05-20

## 2025-05-20 RX ORDER — HYDRALAZINE HYDROCHLORIDE 20 MG/ML
10 INJECTION INTRAMUSCULAR; INTRAVENOUS
Status: DISCONTINUED | OUTPATIENT
Start: 2025-05-20 | End: 2025-05-20 | Stop reason: HOSPADM

## 2025-05-20 RX ORDER — ONDANSETRON 2 MG/ML
4 INJECTION INTRAMUSCULAR; INTRAVENOUS EVERY 6 HOURS PRN
Status: DISCONTINUED | OUTPATIENT
Start: 2025-05-20 | End: 2025-05-20 | Stop reason: HOSPADM

## 2025-05-20 RX ORDER — ACETAMINOPHEN 325 MG/1
650 TABLET ORAL
Status: DISCONTINUED | OUTPATIENT
Start: 2025-05-20 | End: 2025-05-20 | Stop reason: HOSPADM

## 2025-05-20 RX ORDER — ASPIRIN 81 MG/1
81 TABLET ORAL 2 TIMES DAILY
Status: DISCONTINUED | OUTPATIENT
Start: 2025-05-20 | End: 2025-05-20 | Stop reason: HOSPADM

## 2025-05-20 RX ORDER — GABAPENTIN 600 MG/1
600 TABLET ORAL ONCE
Status: COMPLETED | OUTPATIENT
Start: 2025-05-20 | End: 2025-05-20

## 2025-05-20 RX ORDER — EPHEDRINE SULFATE/0.9% NACL/PF 25 MG/5 ML
SYRINGE (ML) INTRAVENOUS
Status: DISCONTINUED | OUTPATIENT
Start: 2025-05-20 | End: 2025-05-20 | Stop reason: SDUPTHER

## 2025-05-20 RX ORDER — SODIUM CHLORIDE, SODIUM LACTATE, POTASSIUM CHLORIDE, CALCIUM CHLORIDE 600; 310; 30; 20 MG/100ML; MG/100ML; MG/100ML; MG/100ML
INJECTION, SOLUTION INTRAVENOUS CONTINUOUS
Status: DISCONTINUED | OUTPATIENT
Start: 2025-05-20 | End: 2025-05-20 | Stop reason: HOSPADM

## 2025-05-20 RX ORDER — OXYCODONE HYDROCHLORIDE 10 MG/1
10 TABLET ORAL EVERY 4 HOURS PRN
Status: DISCONTINUED | OUTPATIENT
Start: 2025-05-20 | End: 2025-05-20 | Stop reason: HOSPADM

## 2025-05-20 RX ORDER — ONDANSETRON 2 MG/ML
4 INJECTION INTRAMUSCULAR; INTRAVENOUS
Status: DISCONTINUED | OUTPATIENT
Start: 2025-05-20 | End: 2025-05-20 | Stop reason: HOSPADM

## 2025-05-20 RX ORDER — KETOROLAC TROMETHAMINE 30 MG/ML
INJECTION, SOLUTION INTRAMUSCULAR; INTRAVENOUS PRN
Status: DISCONTINUED | OUTPATIENT
Start: 2025-05-20 | End: 2025-05-20 | Stop reason: ALTCHOICE

## 2025-05-20 RX ORDER — LIDOCAINE HYDROCHLORIDE 10 MG/ML
1 INJECTION, SOLUTION EPIDURAL; INFILTRATION; INTRACAUDAL; PERINEURAL
Status: COMPLETED | OUTPATIENT
Start: 2025-05-20 | End: 2025-05-20

## 2025-05-20 RX ORDER — SODIUM CHLORIDE 9 MG/ML
INJECTION, SOLUTION INTRAVENOUS PRN
Status: DISCONTINUED | OUTPATIENT
Start: 2025-05-20 | End: 2025-05-20 | Stop reason: HOSPADM

## 2025-05-20 RX ORDER — NALOXONE HYDROCHLORIDE 0.4 MG/ML
INJECTION, SOLUTION INTRAMUSCULAR; INTRAVENOUS; SUBCUTANEOUS PRN
Status: DISCONTINUED | OUTPATIENT
Start: 2025-05-20 | End: 2025-05-20 | Stop reason: HOSPADM

## 2025-05-20 RX ORDER — ASPIRIN 81 MG/1
81 TABLET ORAL 2 TIMES DAILY
Qty: 90 TABLET | Refills: 3 | Status: SHIPPED | OUTPATIENT
Start: 2025-05-20

## 2025-05-20 RX ORDER — ACETAMINOPHEN 325 MG/1
650 TABLET ORAL EVERY 6 HOURS
Status: DISCONTINUED | OUTPATIENT
Start: 2025-05-20 | End: 2025-05-20 | Stop reason: HOSPADM

## 2025-05-20 RX ORDER — MIDAZOLAM HYDROCHLORIDE 2 MG/2ML
INJECTION, SOLUTION INTRAMUSCULAR; INTRAVENOUS
Status: DISCONTINUED | OUTPATIENT
Start: 2025-05-20 | End: 2025-05-20 | Stop reason: SDUPTHER

## 2025-05-20 RX ORDER — ROPIVACAINE HYDROCHLORIDE 2 MG/ML
INJECTION, SOLUTION EPIDURAL; INFILTRATION; PERINEURAL PRN
Status: DISCONTINUED | OUTPATIENT
Start: 2025-05-20 | End: 2025-05-20 | Stop reason: ALTCHOICE

## 2025-05-20 RX ORDER — BUPIVACAINE HYDROCHLORIDE 7.5 MG/ML
INJECTION, SOLUTION INTRASPINAL
Status: COMPLETED | OUTPATIENT
Start: 2025-05-20 | End: 2025-05-20

## 2025-05-20 RX ORDER — METOCLOPRAMIDE HYDROCHLORIDE 5 MG/ML
10 INJECTION INTRAMUSCULAR; INTRAVENOUS
Status: DISCONTINUED | OUTPATIENT
Start: 2025-05-20 | End: 2025-05-20 | Stop reason: HOSPADM

## 2025-05-20 RX ORDER — TRANEXAMIC ACID 100 MG/ML
INJECTION, SOLUTION INTRAVENOUS
Status: DISCONTINUED | OUTPATIENT
Start: 2025-05-20 | End: 2025-05-20 | Stop reason: SDUPTHER

## 2025-05-20 RX ORDER — CALCIUM CHLORIDE 100 MG/ML
INJECTION INTRAVENOUS; INTRAVENTRICULAR PRN
Status: DISCONTINUED | OUTPATIENT
Start: 2025-05-20 | End: 2025-05-20 | Stop reason: ALTCHOICE

## 2025-05-20 RX ADMIN — SODIUM CHLORIDE, POTASSIUM CHLORIDE, SODIUM LACTATE AND CALCIUM CHLORIDE: 600; 310; 30; 20 INJECTION, SOLUTION INTRAVENOUS at 07:28

## 2025-05-20 RX ADMIN — Medication 2 G: at 07:42

## 2025-05-20 RX ADMIN — SODIUM CHLORIDE, SODIUM LACTATE, POTASSIUM CHLORIDE, AND CALCIUM CHLORIDE: .6; .31; .03; .02 INJECTION, SOLUTION INTRAVENOUS at 06:42

## 2025-05-20 RX ADMIN — ACETAMINOPHEN 1000 MG: 500 TABLET ORAL at 06:14

## 2025-05-20 RX ADMIN — ACETAMINOPHEN 650 MG: 325 TABLET ORAL at 12:13

## 2025-05-20 RX ADMIN — DEXAMETHASONE SODIUM PHOSPHATE 10 MG: 10 INJECTION, SOLUTION INTRAMUSCULAR; INTRAVENOUS at 06:43

## 2025-05-20 RX ADMIN — EPHEDRINE SULFATE 12.5 MG: 5 INJECTION INTRAVENOUS at 08:45

## 2025-05-20 RX ADMIN — TRANEXAMIC ACID 1000 MG: 100 INJECTION, SOLUTION INTRAVENOUS at 07:43

## 2025-05-20 RX ADMIN — BUPIVACAINE HYDROCHLORIDE IN DEXTROSE 12.5 MG: 7.5 INJECTION, SOLUTION SUBARACHNOID at 07:28

## 2025-05-20 RX ADMIN — EPHEDRINE SULFATE 12.5 MG: 5 INJECTION INTRAVENOUS at 08:23

## 2025-05-20 RX ADMIN — MIDAZOLAM HYDROCHLORIDE 4 MG: 1 INJECTION, SOLUTION INTRAMUSCULAR; INTRAVENOUS at 07:26

## 2025-05-20 RX ADMIN — Medication 2000 MG: at 14:02

## 2025-05-20 RX ADMIN — PROPOFOL 125 MCG/KG/MIN: 10 INJECTION, EMULSION INTRAVENOUS at 07:41

## 2025-05-20 RX ADMIN — LIDOCAINE HYDROCHLORIDE 1 ML: 10 INJECTION, SOLUTION EPIDURAL; INFILTRATION; INTRACAUDAL; PERINEURAL at 06:42

## 2025-05-20 RX ADMIN — CHLORHEXIDINE GLUCONATE 0.12% ORAL RINSE 15 ML: 1.2 LIQUID ORAL at 06:47

## 2025-05-20 RX ADMIN — GABAPENTIN 600 MG: 600 TABLET, FILM COATED ORAL at 06:14

## 2025-05-20 ASSESSMENT — PAIN DESCRIPTION - DESCRIPTORS
DESCRIPTORS: BURNING

## 2025-05-20 ASSESSMENT — PAIN DESCRIPTION - PAIN TYPE
TYPE: SURGICAL PAIN

## 2025-05-20 ASSESSMENT — PAIN DESCRIPTION - LOCATION
LOCATION: HIP

## 2025-05-20 ASSESSMENT — PAIN DESCRIPTION - ORIENTATION
ORIENTATION: RIGHT

## 2025-05-20 ASSESSMENT — PAIN SCALES - GENERAL
PAINLEVEL_OUTOF10: 2
PAINLEVEL_OUTOF10: 3
PAINLEVEL_OUTOF10: 2
PAINLEVEL_OUTOF10: 3

## 2025-05-20 ASSESSMENT — PAIN - FUNCTIONAL ASSESSMENT
PAIN_FUNCTIONAL_ASSESSMENT: ADULT NONVERBAL PAIN SCALE (NPVS)
PAIN_FUNCTIONAL_ASSESSMENT: 0-10

## 2025-05-20 NOTE — ANESTHESIA PRE PROCEDURE
Department of Anesthesiology  Preprocedure Note       Name:  Victor Hugo Lama   Age:  64 y.o.  :  1960                                          MRN:  [id number]         Date:  2025      Surgeon: Surgeon(s):  Jose Varela MD    Procedure: Procedure(s):  HIP TOTAL ARTHROPLASTY MINIAMALLY INVASIVE ASI RIGHT    Medications prior to admission:   Prior to Admission medications    Medication Sig Start Date End Date Taking? Authorizing Provider   HYDROcodone-acetaminophen (NORCO) 7.5-325 MG per tablet Take 1 tablet by mouth 2 times daily for 30 days. Intended supply: 30 days Max Daily Amount: 2 tablets 25 Yes Alondra Burgos MD   ferrous sulfate (IRON 325) 325 (65 Fe) MG tablet Take 1 tablet by mouth 2 times daily 25   Alondra Burgos MD   polyethylene glycol (GLYCOLAX) 17 GM/SCOOP powder Take 17 g by mouth daily 25  Alondra Burgos MD   cephALEXin (KEFLEX) 500 MG capsule Take 1 capsule by mouth 2 times daily for 7 days 25  Alondra Burgos MD   losartan (COZAAR) 25 MG tablet Take 1 tablet by mouth daily 25   Theo Kenny, APRN - NP   donepezil (ARICEPT) 5 MG tablet Take 1 tablet by mouth nightly 24   Alondra Burgos MD   atorvastatin (LIPITOR) 20 MG tablet Take 1 tablet by mouth nightly 24   Alondra Burgos MD   Omega-3 Fatty Acids (FISH OIL) 1200 MG CAPS Take 1,200 mg by mouth daily    Michael Peoples MD   Biotin 1000 MCG TABS Take 1 tablet by mouth daily    Michael Peoples MD   fenofibrate (TRICOR) 145 MG tablet Take 1 tablet by mouth daily 12/3/24   Alondra Burgos MD   levothyroxine (SYNTHROID) 100 MCG tablet Take 1 tablet by mouth Daily 12/3/24   Alondra Burgos MD   furosemide (LASIX) 20 MG tablet Take 1 tablet by mouth daily 12/3/24   Alondra Burgos MD   folic acid (FOLVITE) 1 MG tablet TAKE 1 TABLET BY MOUTH ONCE DAILY 12/3/24   Alondra Burgos MD   carvedilol (COREG) 3.125 MG tablet Take 1 tablet by mouth 2 times daily (with meals) 24

## 2025-05-20 NOTE — CARE COORDINATION
DISCHARGE PLANNING NOTE:    [facility] accepts for home care.    Electronically signed by Karmen Talbert RN on 5/20/2025 at 12:59 PM

## 2025-05-20 NOTE — ANESTHESIA PROCEDURE NOTES
Spinal Block    End time: 5/20/2025 7:38 AM  Reason for block: primary anesthetic  Staffing  Performed: anesthesiologist   Anesthesiologist: Nagi Johnson MD  Performed by: Nagi Johnson MD  Authorized by: Nagi Johnson MD    Spinal Block  Patient position: sitting  Prep: Betadine  Patient monitoring: cardiac monitor, continuous pulse ox, frequent blood pressure checks and oxygen  Approach: midline  Location: L3/L4  Provider prep: sterile gloves and mask  Local infiltration: lidocaine  Needle  Needle type: pencil-tip   Needle gauge: 22 G  Needle length: 3.5 in  Assessment  Sensory level: T6  CSF: clear  Attempts: 2  Hemodynamics: stable  Preanesthetic Checklist  Completed: patient identified, IV checked, site marked, risks and benefits discussed, surgical/procedural consents, equipment checked, pre-op evaluation, timeout performed, anesthesia consent given, oxygen available, monitors applied/VS acknowledged, fire risk safety assessment completed and verbalized and blood product R/B/A discussed and consented

## 2025-05-20 NOTE — CARE COORDINATION
Continuity of Care Form    Patient Name: Victor Hugo Lama   :  1960  MRN:  [id number]    Admit date:  2025  Discharge date:  25    Code Status Order: Full Code   Advance Directives:    Date/Time Healthcare Directive Type of Healthcare Directive Copy in Chart Healthcare Agent Appointed Healthcare Agent's Name Healthcare Agent's Phone Number    [phone] No, patient does not have an advance directive for healthcare treatment  --  --  --  --  --             Admitting Physician:  Jose Varela MD  PCP: Alondra Burgos MD    Discharging Nurse: Elif  Discharging Hospital Unit/Room#: [facility] OR Pool/NONE  Discharging Unit Phone Number: [phone]    Emergency Contact:   Extended Emergency Contact Information  Primary Emergency Contact: Shannan Joiner  Home Phone: [phone]  Mobile Phone: [phone]  Relation: Brother/Sister  Preferred language: English    Past Surgical History:  Past Surgical History:   Procedure Laterality Date    AMPUTATION Right     index, partial amputation    CARDIAC CATHETERIZATION      OTHER SURGICAL HISTORY  2021    TAVR       Immunization History:   Immunization History   Administered Date(s) Administered    COVID-19, J&J, (age 18y+), IM, 0.5 mL 03/10/2021    COVID-19, MODERNA BLUE border, Primary or Immunocompromised, (age 12y+), IM, 100 mcg/0.5mL 2021    Influenza Virus Vaccine 10/19/2015    Influenza, FLUARIX, FLULAVAL, FLUZONE (age 6 mo+) and AFLURIA, (age 3 y+), Quadv PF, 0.5mL 10/18/2016, 10/15/2018, 2019, 2020, 10/26/2021, 2022, 10/03/2023    Influenza, FLUCELVAX, (age 6 mo+) IM, Trivalent PF, 0.5mL 2024    Pneumococcal, PCV20, PREVNAR 20, (age 6w+), IM, 0.5mL 2024       Active Problems:  Patient Active Problem List   Diagnosis Code    Sciatica M54.30    Low back pain M54.50    Gout M10.9    Other ankle sprain and strain S93.499A, S96.819A    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

## 2025-05-20 NOTE — FLOWSHEET NOTE
Discharge instructions reviewed with the patient and his sister Shannan.  All questions answered.  Patient encouraged to keep the instructions close by so he can review them frequently.  All belongings gathered by sister and with patient at discharge.  Patient discharged home with sister and brother in law.

## 2025-05-20 NOTE — FLOWSHEET NOTE
Patient admitted to room 2040 per bed from PACU.  VS assessment and orientation to the room and call system completed.  Plan for the day and orders reviewed with the patient and his family, sister Shannan and son Victor Hugo.  Patient measured for stockings and provided with 2 pair.  Patient also instructed on using the IS.

## 2025-05-20 NOTE — PROGRESS NOTES
CLINICAL PHARMACY NOTE: MEDS TO BEDS    Total # of Prescriptions Filled: 2   The following medications were delivered to the patient:  Oxycodone/APAP 5/325mg  Aspirin 81mg    Additional Documentation: 5/20/25 12:59pm kbg delivered to pt family son Victor Hugo in room therapy present - Nurse Elif azar lock-up. Son aware pt can not take Norco(Hydrocodone/APAP) that pt has at home while taking Percocet(Oxycodone/APAP)  
Physical Therapy  [facility]   Physical Therapy Evaluation  Date: 25  Patient Name: Victor Hugo Lama       Room:   MRN: [id number]  Account: [id number]   : 1960  (64 y.o.) Gender: male     Discharge Recommendations:  Discharge Recommendations: Patient would benefit from continued therapy after discharge     PT Equipment Recommendations  Equipment Needed: Yes  Mobility Devices: ADL Assistive Devices (bedside commode)     Past Medical History:  has a past medical history of Acute systolic congestive heart failure (HCC), Alcohol abuse, Alcohol abuse, Anxiety, Chronic combined systolic (congestive) and diastolic (congestive) heart failure (HCC), Hyperlipidemia, Hypertension, Hypothyroid, Murmur, Osteoarthritis, Smoking, and Vasovagal reaction.  Past Surgical History:   has a past surgical history that includes other surgical history (2021); amputation (Right); Cardiac catheterization; and Total hip arthroplasty (Right, 2025).    Subjective  Subjective  Subjective: Pt in bed, agreeable to PT OT co eval. PT starting eval prior to OT entry at 1248. CARIN gross     General  Chart Reviewed: Yes  Patient assessed for rehabilitation services?: Yes  Family/Caregiver Present: Yes (sister, brother in law, and son)  Referring Practitioner: Jose Varela MD  Referral Date : 25  Diagnosis: primary OA R hip  Follows Commands: Within Functional Limits     Pain  Pre-Pain: 8  Post-Pain: 8  Pain Location: Right, Hip  Pain Descriptor: Burning  Pain Interventions: Ice, Rest     Social/Functional History  Social/Functional History  Lives With: Son  Type of Home: House  Home Layout: Multi-level, Bed/Bath upstairs, Laundry in basement (prefers shower in basement)  Home Access: Stairs to enter with rails  Entrance Stairs - Number of Steps: 3-4, 4+8 L HR to basement, 10+5 R HR to 2nd floor  Entrance Stairs - Rails: Right  Bathroom Shower/Tub: Walk-in shower, Curtain (slight lip 
Physical Therapy  [facility]   Physical Therapy Treatment  Date: 25  Patient Name: Victor Hugo Lama       Room:   MRN: [id number]  Account: [id number]   : 1960  (64 y.o.) Gender: male     Discharge Recommendations:  Discharge Recommendations: Patient would benefit from continued therapy after discharge     PT Equipment Recommendations  Equipment Needed: Yes  Mobility Devices: ADL Assistive Devices (bedside commode)    General  Chart Reviewed: Yes  Patient assessed for rehabilitation services?: Yes  Family/Caregiver Present: Yes (sister, brother in law, and son)  Referring Practitioner: Jose Varela MD  Referral Date : 25  Diagnosis: primary OA R hip  Follows Commands: Within Functional Limits    Past Medical History:  has a past medical history of Acute systolic congestive heart failure (HCC), Alcohol abuse, Alcohol abuse, Anxiety, Chronic combined systolic (congestive) and diastolic (congestive) heart failure (HCC), Hyperlipidemia, Hypertension, Hypothyroid, Murmur, Osteoarthritis, Smoking, and Vasovagal reaction.  Past Surgical History:   has a past surgical history that includes other surgical history (2021); amputation (Right); Cardiac catheterization; and Total hip arthroplasty (Right, 2025).    Restrictions  Restrictions/Precautions  Restrictions/Precautions: Weight Bearing  Activity Level: Up with Assist  Required Braces or Orthoses?: No  Implants Present? : Metal implants (R MAT)  Lower Extremity Weight Bearing Restrictions  Right Lower Extremity Weight Bearing: Weight Bearing As Tolerated (Full WBAT)  Position Activity Restriction  Other Position/Activity Restrictions: anterior approach - no SLR R LE     Subjective  Subjective  Subjective: Pt in bed, agreeable to PT OT co eval. PT starting eval prior to OT entry at 1248. CARIN gross         Pain  Pre-Pain: 0  Post-Pain: 3  Pain Location: Right, Hip  Pain Descriptor: Burning  Pain 
to don TEDs. TA to don socks and assist to hold heel of shoe open while pt donned shoe  Toileting: Maximum assistance, Based on clinical judgement  Additional Comments: ADL scores are based on skilled observation unless otherwise noted. Pt is currently limited by decreased strength, endurance, activity tolerance, impaired balance, and increased pain which impacts the pt's ability to safely and independently complete self care tasks    Hand Dominance  Hand Dominance: Right    Orientation  Overall Orientation Status: Within Normal Limits  Cognition  Overall Cognitive Status: Exceptions  Arousal/Alertness: Appears intact  Following Commands: Appears intact  Attention Span: Appears intact  Memory: Appears intact  Safety Judgement: Decreased awareness of need for assistance, Decreased awareness of need for safety  Problem Solving: Assistance required to generate solutions, Assistance required to implement solutions  Insights: Decreased awareness of deficits  Initiation: Appears intact  Sequencing: Appears intact   Sensation  Overall Sensation Status: Impaired (numbness bottoms of feet bilateral)  Vision  Vision: Impaired  Vision Exceptions: Wears glasses for reading  Hearing  Hearing: Within functional limits    UE Function  LUE AROM (degrees)  LUE AROM : WFL  Left Hand AROM (degrees)  Left Hand AROM: WFL  Tone LUE  LUE Tone: Normotonic  LUE Strength  Gross LUE Strength: WFL  L Hand General: 4/5  LUE Strength Comment: Grossly 4/5    RUE AROM (degrees)  RUE AROM : WFL  Right Hand AROM (degrees)  Right Hand AROM: WFL  Tone RUE  RUE Tone: Normotonic  RUE Strength  Gross RUE Strength: WFL  R Hand General: 4/5  RUE Strength Comment: Grossly 4/5    Fine Motor Skills/Coordination  Coordination  Movements Are Fluid And Coordinated: Yes     Bed Mobility  Bed mobility  Rolling to Left: Stand by assistance  Supine to Sit: Stand by assistance  Sit to Supine: Unable to assess (pt left up in chair)  Scooting: Stand by assistance  Bed

## 2025-05-20 NOTE — ANESTHESIA POSTPROCEDURE EVALUATION
Department of Anesthesiology  Postprocedure Note    Patient: Victor Hugo Lama  MRN: [id number]  YOB: 1960  Date of evaluation: 5/20/2025    Procedure Summary       Date: 05/20/25 Room / Location: [address]    Anesthesia Start: 0727 Anesthesia Stop: 0923    Procedure: HIP TOTAL ARTHROPLASTY MINIAMALLY INVASIVE ASI RIGHT (Right: Hip) Diagnosis:       Osteoarthritis of right hip      (Osteoarthritis of right hip [M16.11])    Surgeons: Jose Varela MD Responsible Provider: Nagi Johnson MD    Anesthesia Type: spinal, general ASA Status: 3            Anesthesia Type: No value filed.    Clyde Phase I: Clyde Score: 10    Clyde Phase II:      Anesthesia Post Evaluation    Comments: POST- ANESTHESIA EVALUATION       Pt Name: Victor Hugo Lama  MRN: [id number]  YOB: 1960  Date of evaluation: 5/20/2025  Time:  10:18 AM      BP (!) 160/52   Pulse 63   Temp 97.5 °F (36.4 °C)   Resp 11   Ht 1.797 m (5' 10.75\")   Wt 91.2 kg (201 lb)   SpO2 98%   BMI 28.23 kg/m²      Consciousness Level  Awake  Cardiopulmonary Status  Stable  Pain Adequately Treated YES  Nausea / Vomiting  NO  Adequate Hydration  YES  Anesthesia Related Complications NONE      Electronically signed by Nagi Johnson MD on 5/20/2025 at 10:18 AM           No notable events documented.

## 2025-05-20 NOTE — DISCHARGE INSTRUCTIONS
DISCHARGE INSTRUCTIONS  Caring for yourself after joint replacement surgery (Total Hip and Total Knee Replacement)    Activity and Therapy  Receive physical therapy three times per week. (Pain medication one hour prior to therapy)   Perform PT exercises on own when not receiving home or outpatient PT.  Ideally exercises should be at least two times a day.  Increase level of activity and ambulation each day.  Perform deep breathing exercises daily.  Patient provides self-care when possible.  Work on Range of motion for Total knee patients.   No pillow under the knee for Total knee patients.  Elevate the surgical leg when seated.  No driving until cleared by surgeon      Diet:  Increase oral intake of fruits, fiber and water to prevent constipation.  Drink fluids frequently and take stool softeners to aid in bowel motility.  Increase protein intake/reduce high-sugar intake to help promote healing and prevent infection.    Incision Care:  Keep Primaseal or other dressing intact and remove as directed by surgeon, unless saturated, in which case, call surgeon and request instructions.  If dressing falls off, call surgeon.  Iron Hose on in the am and off in the pm to reduce swelling.  Ice affected area four times a day, for twenty minutes.    Pain Medications and Anticoagulant  You have been place on an anticoagulant to prevent blood clots.  Take this medication exactly as prescribed.  Be alert for signs of bleeding.  Take care not to injure yourself.  You have been provided pain medicine to control your pain.  Do not take more narcotics than prescribed.  You may begin weaning from narcotics as your pain level improves by decreasing the amount or frequency of the narcotics.  You may also take plain acetaminophen as an alternate to the narcotics.   Never exceed the recommended dosage.   Ice, rest and elevating the surgical limb also help with pain control.      When to call the Surgeon:  Increased redness, warmth, drainage,

## 2025-05-20 NOTE — OP NOTE
Operative Note      Patient: Victor Hugo Lama  YOB: 1960  MRN: [id number]    Date of Procedure: 5/20/2025    Pre-Op Diagnosis Codes:      * Osteoarthritis of right hip [M16.11]    Post-Op Diagnosis: Same       Procedure(s):  HIP TOTAL ARTHROPLASTY MINIAMALLY INVASIVE ASI RIGHT    Surgeon(s):  Jose Varela MD    Assistant:   Resident: Steven Mendes DO Joe Bielecki, CST    Anesthesia: Spinal    Estimated Blood Loss (mL): 400    Complications: None    Specimens:   * No specimens in log *    Implants:  Implant Name Type Inv. Item Serial No.  Lot No. LRB No. Used Action   APPLICATOR PROCEDURE KIT 11:1 RATIO - [id number]  APPLICATOR PROCEDURE KIT 11:1 RATIO  [employer]  Right 1 Implanted   LINER ACET NEUT G 40 MM VIVACIT-E G7 - [id number]  LINER ACET NEUT G 40 MM VIVACIT-E G7  [facility] [id number] Right 1 Implanted   G7 FINNED 4 HOLE SHELL 58G - [id number]  G7 FINNED 4 HOLE SHELL 58G  [facility] [id number] Right 1 Implanted   STEM FEM SZ 14 L148MM STD OFFSET HIP TI PPS TYP 1 TAPR - [id number]  STEM FEM SZ 14 L148MM STD OFFSET HIP TI PPS TYP 1 TAPR  [facility] [id number] Right 1 Implanted   SLEEVE FEM STD OFFSET HIP TYP 1 TAPR FOR CERAMIC BIOLOX - [id number]  SLEEVE FEM STD OFFSET HIP TYP 1 TAPR FOR CERAMIC BIOLOX  [facility] [id number] Right 1 Implanted   HEAD FEM [id number] HIP BIOLOX DELT OPT FOR G7 ACET SYS - [id number]  HEAD FEM [id number] HIP BIOLOX DELT OPT FOR G7 ACET SYS  [facility] [id number] Right 1 Implanted         Drains: * No LDAs found *    Findings:  Infection Present At Time Of Surgery (PATOS) (choose all levels that have infection present):  No infection present  Other Findings: Severe degenerative joint disease right hip.  Patient also significant leg length discrepancy with the left being much shorter than right his patient has severe degenerative

## 2025-05-20 NOTE — DISCHARGE INSTR - COC
Continuity of Care Form    Patient Name: Victor Hugo Lama   :  1960  MRN:  [id number]    Admit date:  2025  Discharge date:  25    Code Status Order: Full Code   Advance Directives:    Date/Time Healthcare Directive Type of Healthcare Directive Copy in Chart Healthcare Agent Appointed Healthcare Agent's Name Healthcare Agent's Phone Number    25 0623 No, patient does not have an advance directive for healthcare treatment  --  --  --  --  --             Admitting Physician:  Jose Varela MD  PCP: Alondra Burgos MD    Discharging Nurse: Elif  Discharging Hospital Unit/Room#: [facility] OR Pool/NONE  Discharging Unit Phone Number: [phone]    Emergency Contact:   Extended Emergency Contact Information  Primary Emergency Contact: Shannan Joiner  Home Phone: [phone]  Mobile Phone: [phone]  Relation: Brother/Sister  Preferred language: English    Past Surgical History:  Past Surgical History:   Procedure Laterality Date    AMPUTATION Right     index, partial amputation    CARDIAC CATHETERIZATION      OTHER SURGICAL HISTORY  2021    TAVR       Immunization History:   Immunization History   Administered Date(s) Administered    COVID-19, J&J, (age 18y+), IM, 0.5 mL 03/10/2021    COVID-19, MODERNA BLUE border, Primary or Immunocompromised, (age 12y+), IM, 100 mcg/0.5mL 2021    Influenza Virus Vaccine 10/19/2015    Influenza, FLUARIX, FLULAVAL, FLUZONE (age 6 mo+) and AFLURIA, (age 3 y+), Quadv PF, 0.5mL 10/18/2016, 10/15/2018, 2019, 2020, 10/26/2021, 2022, 10/03/2023    Influenza, FLUCELVAX, (age 6 mo+) IM, Trivalent PF, 0.5mL 2024    Pneumococcal, PCV20, PREVNAR 20, (age 6w+), IM, 0.5mL 2024       Active Problems:  Patient Active Problem List   Diagnosis Code    Sciatica M54.30    Low back pain M54.50    Gout M10.9    Other ankle sprain and strain S93.499A, S96.819A    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

## 2025-05-20 NOTE — INTERVAL H&P NOTE
Update History & Physical    The patient's History and Physical of  May 6, 2025    Patient will be having : Procedure(s):  HIP TOTAL ARTHROPLASTY MINIAMALLY INVASIVE ASI RIGHT      There was  no change. Or updates at this time.      Patient  did  obtain cardiac and Dental  clearance.     Blood thinners/ Anticoagulant:  aspirin    Patient denies any personal or family problems with anesthesia.    Patient was physically assessed, including cardiovascular and respiratory. Denies any chest pain or SOB. Denies any recent URI, no fever or chills.     Patient understands and wants to proceed with the procedure.     Vitals:  BP (!) 131/53   Pulse (!) 46   Temp 97.7 °F (36.5 °C) (Infrared)   Resp 11   Ht 1.797 m (5' 10.75\")   Wt 91.2 kg (201 lb)   SpO2 98%   BMI 28.23 kg/m²  Body mass index is 28.23 kg/m².    Electronically signed by WILLIAM ROJO CNP on 5/20/2025 at 6:14 AM      Note marked for cosign by provider

## 2025-05-30 DIAGNOSIS — M25.551 HIP PAIN, CHRONIC, RIGHT: ICD-10-CM

## 2025-05-30 DIAGNOSIS — M54.32 SCIATICA OF LEFT SIDE: ICD-10-CM

## 2025-05-30 DIAGNOSIS — G89.29 HIP PAIN, CHRONIC, RIGHT: ICD-10-CM

## 2025-05-30 DIAGNOSIS — Z96.641 STATUS POST RIGHT HIP REPLACEMENT: Primary | ICD-10-CM

## 2025-05-30 NOTE — TELEPHONE ENCOUNTER
Last visit: 05/13/25  Last Med refill: 04/25  Does patient have enough medication for 72 hours: No:     Next Visit Date:  Future Appointments   Date Time Provider Department Center   6/2/2025  9:20 AM Jose Varela MD [id number]   6/4/2025  9:00 AM [facility]   8/18/2025 10:45 AM Evelin Cummings MD [facility]       Health Maintenance   Topic Date Due    DTaP/Tdap/Td vaccine (1 - Tdap) Never done    Annual Wellness Visit (Medicare Advantage)  Never done    Shingles vaccine (1 of 2) 12/03/2025 (Originally 12/18/2009)    COVID-19 Vaccine (3 - 2024-25 season) 12/03/2025 (Originally 9/1/2024)    Respiratory Syncytial Virus (RSV) Pregnant or age 60 yrs+ (1 - Risk 60-74 years 1-dose series) 12/03/2025 (Originally 12/18/2019)    A1C test (Diabetic or Prediabetic)  07/02/2025    Lipids  07/02/2025    Lung Cancer Screening &/or Counseling  11/15/2025    Colorectal Cancer Screen  12/14/2025    Depression Screen  03/04/2026    GFR test (Diabetes, CKD 3-4, OR last GFR 15-59)  05/06/2026    Flu vaccine  Completed    Pneumococcal 50+ years Vaccine  Completed    AAA screen  Completed    Hepatitis A vaccine  Aged Out    Hepatitis B vaccine  Aged Out    Hib vaccine  Aged Out    Polio vaccine  Aged Out    Meningococcal (ACWY) vaccine  Aged Out    Meningococcal B vaccine  Aged Out    Pneumococcal 0-49 years Vaccine  Discontinued    Diabetes screen  Discontinued    Hepatitis C screen  Discontinued    HIV screen  Discontinued    Prostate Specific Antigen (PSA) Screening or Monitoring  Discontinued       Hemoglobin A1C (%)   Date Value   07/02/2024 5.7   04/10/2024 5.9   05/05/2023 5.6             ( goal A1C is < 7)   No components found for: \"LABMICR\"  No components found for: \"LDLCHOLESTEROL\", \"LDLCALC\"    (goal LDL is <100)   AST (U/L)   Date Value   07/02/2024 47 (H)     ALT (U/L)   Date Value   07/02/2024 36     BUN (mg/dL)   Date Value   05/06/2025 28 (H)     BP Readings from

## 2025-05-30 NOTE — TELEPHONE ENCOUNTER
Patients sister called regarding patient's medication refill. He is S/P: Right MAT on 5/20/25. Pharmacy confirmed

## 2025-05-31 RX ORDER — OXYCODONE AND ACETAMINOPHEN 5; 325 MG/1; MG/1
1 TABLET ORAL EVERY 4 HOURS PRN
Qty: 42 TABLET | Refills: 0 | Status: SHIPPED | OUTPATIENT
Start: 2025-05-31 | End: 2025-06-07

## 2025-06-02 ENCOUNTER — OFFICE VISIT (OUTPATIENT)
Dept: ORTHOPEDIC SURGERY | Age: 65
End: 2025-06-02

## 2025-06-02 DIAGNOSIS — Z96.641 STATUS POST TOTAL REPLACEMENT OF RIGHT HIP: Primary | ICD-10-CM

## 2025-06-02 PROCEDURE — 99024 POSTOP FOLLOW-UP VISIT: CPT | Performed by: ORTHOPAEDIC SURGERY

## 2025-06-02 RX ORDER — OXYCODONE AND ACETAMINOPHEN 5; 325 MG/1; MG/1
1 TABLET ORAL EVERY 6 HOURS PRN
Qty: 28 TABLET | Refills: 0 | Status: SHIPPED | OUTPATIENT
Start: 2025-06-02 | End: 2025-06-04 | Stop reason: ALTCHOICE

## 2025-06-02 NOTE — PROGRESS NOTES
[facility]                   Jose Varela M.D.            [address], [address]               [place], [place] [address]           Dept Phone: [phone]           Dept Fax:  [phone] [address] [facility]                       [address]           [place], [place] [address]          Dept Phone: [phone]           Dept Fax:  [phone]      Chief Compliant:  Chief Complaint   Patient presents with    Post-Op Check     Rt MAT        History of Present Illness:  Patient returns today 2 weeks status post right MAT-ASI. Patient has no major complaints.     Review of Systems   Constitutional: Negative for fever, chills, sweats, recent injury, recent illness  Neurological: Negative for Headaches, numbness, or weakness.    Integumentary: Negative for rash, itching, ecchymosis, or wounds.   Musculoskeletal: Positive for Post-Op Check (Rt MAT)       Physical Exam:  Constitutional: Patient is oriented to person, place, and time. Patient appears well-developed and well nourished.   Musculoskeletal: Normal gait. Expected degree of meralgia parasthetica. Expected mild pain with gentle ROM of hip. Calves negative. Aubrie's negative. Neurovascular Intact. Leg lengths are obviously not equal with the patient has severe disease of his left hip.  Neurological: Patient is alert and oriented to person, place, and time. Normal strenght. No sensory deficit.  Skin: Skin is warm and dry. ASI incision without redness or drainage.   Nursing note and vitals reviewed.     Labs and Imaging:     XR taken today:  XR HIP 1 VW W PELVIS RIGHT  Result Date: 6/2/2025  X-rays taken today reviewed by me show AP pelvis lateral of the right hip.  Patient had a previous right total of arthroplasty performed.  Components appear to be in excellent alignment position on AP and lateral views without any periprosthetic complications.  Leg lengths are obviously unequal as the patient has severe disease

## 2025-06-03 RX ORDER — HYDROCODONE BITARTRATE AND ACETAMINOPHEN 7.5; 325 MG/1; MG/1
1 TABLET ORAL 2 TIMES DAILY
Qty: 60 TABLET | Refills: 0 | Status: ON HOLD | OUTPATIENT
Start: 2025-06-03 | End: 2025-06-06 | Stop reason: HOSPADM

## 2025-06-04 ENCOUNTER — HOSPITAL ENCOUNTER (INPATIENT)
Age: 65
LOS: 2 days | Discharge: HOME | End: 2025-06-06
Attending: EMERGENCY MEDICINE | Admitting: STUDENT IN AN ORGANIZED HEALTH CARE EDUCATION/TRAINING PROGRAM
Payer: MEDICARE

## 2025-06-04 ENCOUNTER — APPOINTMENT (OUTPATIENT)
Dept: GENERAL RADIOLOGY | Age: 65
End: 2025-06-04
Payer: MEDICARE

## 2025-06-04 ENCOUNTER — OFFICE VISIT (OUTPATIENT)
Dept: FAMILY MEDICINE CLINIC | Age: 65
End: 2025-06-04
Payer: MEDICARE

## 2025-06-04 ENCOUNTER — APPOINTMENT (OUTPATIENT)
Dept: CT IMAGING | Age: 65
End: 2025-06-04
Payer: MEDICARE

## 2025-06-04 VITALS
HEART RATE: 62 BPM | SYSTOLIC BLOOD PRESSURE: 96 MMHG | HEIGHT: 70 IN | BODY MASS INDEX: 27.35 KG/M2 | OXYGEN SATURATION: 96 % | WEIGHT: 191 LBS | DIASTOLIC BLOOD PRESSURE: 64 MMHG

## 2025-06-04 DIAGNOSIS — Z12.2 ENCOUNTER FOR SCREENING FOR LUNG CANCER: ICD-10-CM

## 2025-06-04 DIAGNOSIS — R79.89 ELEVATED TROPONIN: ICD-10-CM

## 2025-06-04 DIAGNOSIS — D64.9 ANEMIA, UNSPECIFIED TYPE: ICD-10-CM

## 2025-06-04 DIAGNOSIS — R00.1 BRADYCARDIA: ICD-10-CM

## 2025-06-04 DIAGNOSIS — Z12.11 COLON CANCER SCREENING: Primary | ICD-10-CM

## 2025-06-04 DIAGNOSIS — R55 SYNCOPE, UNSPECIFIED SYNCOPE TYPE: Primary | ICD-10-CM

## 2025-06-04 DIAGNOSIS — N17.9 AKI (ACUTE KIDNEY INJURY): ICD-10-CM

## 2025-06-04 DIAGNOSIS — R00.2 PALPITATIONS: ICD-10-CM

## 2025-06-04 DIAGNOSIS — I95.9 HYPOTENSION, UNSPECIFIED HYPOTENSION TYPE: ICD-10-CM

## 2025-06-04 DIAGNOSIS — Z12.5 PROSTATE CANCER SCREENING: ICD-10-CM

## 2025-06-04 DIAGNOSIS — R55 VASOVAGAL SYNCOPE: ICD-10-CM

## 2025-06-04 LAB
ALBUMIN SERPL-MCNC: 3.8 G/DL (ref 3.5–5.2)
ALBUMIN/GLOB SERPL: 1.1 {RATIO} (ref 1–2.5)
ALP SERPL-CCNC: 74 U/L (ref 40–129)
ALT SERPL-CCNC: 12 U/L (ref 10–50)
ANION GAP SERPL CALCULATED.3IONS-SCNC: 10 MMOL/L (ref 9–16)
AST SERPL-CCNC: 25 U/L (ref 10–50)
BASOPHILS # BLD: 0.03 K/UL (ref 0–0.2)
BASOPHILS NFR BLD: 0 % (ref 0–2)
BILIRUB SERPL-MCNC: 0.2 MG/DL (ref 0–1.2)
BNP SERPL-MCNC: 630 PG/ML (ref 0–125)
BUN SERPL-MCNC: 27 MG/DL (ref 8–23)
CALCIUM SERPL-MCNC: 9.7 MG/DL (ref 8.6–10.4)
CHLORIDE SERPL-SCNC: 101 MMOL/L (ref 98–107)
CO2 SERPL-SCNC: 24 MMOL/L (ref 20–31)
CREAT SERPL-MCNC: 2.2 MG/DL (ref 0.7–1.2)
EOSINOPHIL # BLD: 0.22 K/UL (ref 0–0.44)
EOSINOPHILS RELATIVE PERCENT: 2 % (ref 1–4)
ERYTHROCYTE [DISTWIDTH] IN BLOOD BY AUTOMATED COUNT: 18.3 % (ref 11.8–14.4)
FERRITIN SERPL-MCNC: 156 NG/ML
FOLATE SERPL-MCNC: 34.7 NG/ML (ref 4.8–24.2)
GFR, ESTIMATED: 32 ML/MIN/1.73M2
GLUCOSE SERPL-MCNC: 111 MG/DL (ref 74–99)
HAPTOGLOB SERPL-MCNC: 151 MG/DL (ref 30–200)
HCT VFR BLD AUTO: 22.4 % (ref 40.7–50.3)
HCT VFR BLD AUTO: 24 % (ref 40.7–50.3)
HGB BLD-MCNC: 6.8 G/DL (ref 13–17)
HGB BLD-MCNC: 7.2 G/DL (ref 13–17)
IMM GRANULOCYTES # BLD AUTO: 0.04 K/UL (ref 0–0.3)
IMM GRANULOCYTES NFR BLD: 0 %
IMM RETICS NFR: 12.8 % (ref 2.7–18.3)
IRON SATN MFR SERPL: 10 % (ref 20–55)
IRON SERPL-MCNC: 40 UG/DL (ref 61–157)
LDH SERPL-CCNC: 309 U/L (ref 135–225)
LYMPHOCYTES NFR BLD: 0.98 K/UL (ref 1.1–3.7)
LYMPHOCYTES RELATIVE PERCENT: 10 % (ref 24–43)
MAGNESIUM SERPL-MCNC: 2.8 MG/DL (ref 1.6–2.4)
MCH RBC QN AUTO: 31 PG (ref 25.2–33.5)
MCHC RBC AUTO-ENTMCNC: 30 G/DL (ref 28.4–34.8)
MCV RBC AUTO: 103.4 FL (ref 82.6–102.9)
MONOCYTES NFR BLD: 0.65 K/UL (ref 0.1–1.2)
MONOCYTES NFR BLD: 7 % (ref 3–12)
NEUTROPHILS NFR BLD: 80 % (ref 36–65)
NEUTS SEG NFR BLD: 7.49 K/UL (ref 1.5–8.1)
NRBC BLD-RTO: 0 PER 100 WBC
PLATELET # BLD AUTO: 504 K/UL (ref 138–453)
PMV BLD AUTO: 10.4 FL (ref 8.1–13.5)
POTASSIUM SERPL-SCNC: 4.4 MMOL/L (ref 3.7–5.3)
PROT SERPL-MCNC: 7.2 G/DL (ref 6.6–8.7)
RBC # BLD AUTO: 2.32 M/UL (ref 4.21–5.77)
RBC # BLD: ABNORMAL 10*6/UL
RBC # BLD: ABNORMAL 10*6/UL
RETIC HEMOGLOBIN: 34.9 PG (ref 28.2–35.7)
RETICS # AUTO: 0.22 M/UL (ref 0.03–0.08)
RETICS/RBC NFR AUTO: 9.3 % (ref 0.5–1.9)
SODIUM SERPL-SCNC: 135 MMOL/L (ref 136–145)
TIBC SERPL-MCNC: 407 UG/DL (ref 250–450)
TROPONIN I SERPL HS-MCNC: 109 NG/L (ref 0–22)
TROPONIN I SERPL HS-MCNC: 116 NG/L (ref 0–22)
TSH SERPL DL<=0.05 MIU/L-ACNC: 0.96 UIU/ML (ref 0.27–4.2)
UNSATURATED IRON BINDING CAPACITY: 367 UG/DL (ref 112–347)
VIT B12 SERPL-MCNC: 385 PG/ML (ref 232–1245)
WBC OTHER # BLD: 9.4 K/UL (ref 3.5–11.3)

## 2025-06-04 PROCEDURE — 83540 ASSAY OF IRON: CPT

## 2025-06-04 PROCEDURE — 84484 ASSAY OF TROPONIN QUANT: CPT

## 2025-06-04 PROCEDURE — 86850 RBC ANTIBODY SCREEN: CPT

## 2025-06-04 PROCEDURE — 82728 ASSAY OF FERRITIN: CPT

## 2025-06-04 PROCEDURE — 83550 IRON BINDING TEST: CPT

## 2025-06-04 PROCEDURE — 6370000000 HC RX 637 (ALT 250 FOR IP)

## 2025-06-04 PROCEDURE — 83735 ASSAY OF MAGNESIUM: CPT

## 2025-06-04 PROCEDURE — 71045 X-RAY EXAM CHEST 1 VIEW: CPT

## 2025-06-04 PROCEDURE — 99285 EMERGENCY DEPT VISIT HI MDM: CPT

## 2025-06-04 PROCEDURE — 83615 LACTATE (LD) (LDH) ENZYME: CPT

## 2025-06-04 PROCEDURE — 93005 ELECTROCARDIOGRAM TRACING: CPT | Performed by: EMERGENCY MEDICINE

## 2025-06-04 PROCEDURE — G8427 DOCREV CUR MEDS BY ELIG CLIN: HCPCS | Performed by: STUDENT IN AN ORGANIZED HEALTH CARE EDUCATION/TRAINING PROGRAM

## 2025-06-04 PROCEDURE — 1200000000 HC SEMI PRIVATE

## 2025-06-04 PROCEDURE — 1036F TOBACCO NON-USER: CPT | Performed by: STUDENT IN AN ORGANIZED HEALTH CARE EDUCATION/TRAINING PROGRAM

## 2025-06-04 PROCEDURE — 96374 THER/PROPH/DIAG INJ IV PUSH: CPT

## 2025-06-04 PROCEDURE — 1123F ACP DISCUSS/DSCN MKR DOCD: CPT | Performed by: STUDENT IN AN ORGANIZED HEALTH CARE EDUCATION/TRAINING PROGRAM

## 2025-06-04 PROCEDURE — 85014 HEMATOCRIT: CPT

## 2025-06-04 PROCEDURE — 83010 ASSAY OF HAPTOGLOBIN QUANT: CPT

## 2025-06-04 PROCEDURE — 2580000003 HC RX 258

## 2025-06-04 PROCEDURE — 99223 1ST HOSP IP/OBS HIGH 75: CPT | Performed by: STUDENT IN AN ORGANIZED HEALTH CARE EDUCATION/TRAINING PROGRAM

## 2025-06-04 PROCEDURE — 6360000002 HC RX W HCPCS: Performed by: EMERGENCY MEDICINE

## 2025-06-04 PROCEDURE — 86923 COMPATIBILITY TEST ELECTRIC: CPT

## 2025-06-04 PROCEDURE — 85045 AUTOMATED RETICULOCYTE COUNT: CPT

## 2025-06-04 PROCEDURE — 36415 COLL VENOUS BLD VENIPUNCTURE: CPT

## 2025-06-04 PROCEDURE — 2500000003 HC RX 250 WO HCPCS

## 2025-06-04 PROCEDURE — 82746 ASSAY OF FOLIC ACID SERUM: CPT

## 2025-06-04 PROCEDURE — 80053 COMPREHEN METABOLIC PANEL: CPT

## 2025-06-04 PROCEDURE — 3017F COLORECTAL CA SCREEN DOC REV: CPT | Performed by: STUDENT IN AN ORGANIZED HEALTH CARE EDUCATION/TRAINING PROGRAM

## 2025-06-04 PROCEDURE — 82607 VITAMIN B-12: CPT

## 2025-06-04 PROCEDURE — G8419 CALC BMI OUT NRM PARAM NOF/U: HCPCS | Performed by: STUDENT IN AN ORGANIZED HEALTH CARE EDUCATION/TRAINING PROGRAM

## 2025-06-04 PROCEDURE — 86901 BLOOD TYPING SEROLOGIC RH(D): CPT

## 2025-06-04 PROCEDURE — 84443 ASSAY THYROID STIM HORMONE: CPT

## 2025-06-04 PROCEDURE — 93005 ELECTROCARDIOGRAM TRACING: CPT

## 2025-06-04 PROCEDURE — 83880 ASSAY OF NATRIURETIC PEPTIDE: CPT

## 2025-06-04 PROCEDURE — 86900 BLOOD TYPING SEROLOGIC ABO: CPT

## 2025-06-04 PROCEDURE — 85018 HEMOGLOBIN: CPT

## 2025-06-04 PROCEDURE — 85025 COMPLETE CBC W/AUTO DIFF WBC: CPT

## 2025-06-04 PROCEDURE — 99215 OFFICE O/P EST HI 40 MIN: CPT | Performed by: STUDENT IN AN ORGANIZED HEALTH CARE EDUCATION/TRAINING PROGRAM

## 2025-06-04 PROCEDURE — 96375 TX/PRO/DX INJ NEW DRUG ADDON: CPT

## 2025-06-04 PROCEDURE — 70450 CT HEAD/BRAIN W/O DYE: CPT

## 2025-06-04 RX ORDER — POTASSIUM CHLORIDE 1500 MG/1
40 TABLET, EXTENDED RELEASE ORAL PRN
Status: DISCONTINUED | OUTPATIENT
Start: 2025-06-04 | End: 2025-06-06 | Stop reason: HOSPADM

## 2025-06-04 RX ORDER — SODIUM CHLORIDE 0.9 % (FLUSH) 0.9 %
5-40 SYRINGE (ML) INJECTION PRN
Status: DISCONTINUED | OUTPATIENT
Start: 2025-06-04 | End: 2025-06-06 | Stop reason: HOSPADM

## 2025-06-04 RX ORDER — MORPHINE SULFATE 4 MG/ML
4 INJECTION INTRAVENOUS ONCE
Refills: 0 | Status: COMPLETED | OUTPATIENT
Start: 2025-06-04 | End: 2025-06-04

## 2025-06-04 RX ORDER — FENTANYL CITRATE 50 UG/ML
50 INJECTION, SOLUTION INTRAMUSCULAR; INTRAVENOUS ONCE
Status: COMPLETED | OUTPATIENT
Start: 2025-06-04 | End: 2025-06-04

## 2025-06-04 RX ORDER — ATORVASTATIN CALCIUM 20 MG/1
20 TABLET, FILM COATED ORAL NIGHTLY
Status: DISCONTINUED | OUTPATIENT
Start: 2025-06-04 | End: 2025-06-06 | Stop reason: HOSPADM

## 2025-06-04 RX ORDER — POLYETHYLENE GLYCOL 3350 17 G/17G
17 POWDER, FOR SOLUTION ORAL DAILY PRN
Status: DISCONTINUED | OUTPATIENT
Start: 2025-06-04 | End: 2025-06-06 | Stop reason: HOSPADM

## 2025-06-04 RX ORDER — POTASSIUM CHLORIDE 7.45 MG/ML
10 INJECTION INTRAVENOUS PRN
Status: DISCONTINUED | OUTPATIENT
Start: 2025-06-04 | End: 2025-06-06 | Stop reason: HOSPADM

## 2025-06-04 RX ORDER — ONDANSETRON 2 MG/ML
4 INJECTION INTRAMUSCULAR; INTRAVENOUS EVERY 6 HOURS PRN
Status: DISCONTINUED | OUTPATIENT
Start: 2025-06-04 | End: 2025-06-06 | Stop reason: HOSPADM

## 2025-06-04 RX ORDER — MAGNESIUM SULFATE IN WATER 40 MG/ML
2000 INJECTION, SOLUTION INTRAVENOUS PRN
Status: DISCONTINUED | OUTPATIENT
Start: 2025-06-04 | End: 2025-06-06 | Stop reason: HOSPADM

## 2025-06-04 RX ORDER — OXYCODONE AND ACETAMINOPHEN 5; 325 MG/1; MG/1
1 TABLET ORAL EVERY 8 HOURS PRN
Refills: 0 | Status: DISCONTINUED | OUTPATIENT
Start: 2025-06-04 | End: 2025-06-06 | Stop reason: HOSPADM

## 2025-06-04 RX ORDER — LOSARTAN POTASSIUM 50 MG/1
25 TABLET ORAL DAILY
Status: DISCONTINUED | OUTPATIENT
Start: 2025-06-04 | End: 2025-06-06 | Stop reason: HOSPADM

## 2025-06-04 RX ORDER — ACETAMINOPHEN 325 MG/1
650 TABLET ORAL EVERY 6 HOURS PRN
Status: DISCONTINUED | OUTPATIENT
Start: 2025-06-04 | End: 2025-06-06 | Stop reason: HOSPADM

## 2025-06-04 RX ORDER — SODIUM CHLORIDE 0.9 % (FLUSH) 0.9 %
5-40 SYRINGE (ML) INJECTION EVERY 12 HOURS SCHEDULED
Status: DISCONTINUED | OUTPATIENT
Start: 2025-06-04 | End: 2025-06-06 | Stop reason: HOSPADM

## 2025-06-04 RX ORDER — LEVOTHYROXINE SODIUM 100 UG/1
100 TABLET ORAL DAILY
Status: DISCONTINUED | OUTPATIENT
Start: 2025-06-04 | End: 2025-06-06 | Stop reason: HOSPADM

## 2025-06-04 RX ORDER — SODIUM CHLORIDE 9 MG/ML
INJECTION, SOLUTION INTRAVENOUS PRN
Status: COMPLETED | OUTPATIENT
Start: 2025-06-04 | End: 2025-06-05

## 2025-06-04 RX ORDER — SODIUM CHLORIDE 9 MG/ML
INJECTION, SOLUTION INTRAVENOUS PRN
Status: DISCONTINUED | OUTPATIENT
Start: 2025-06-04 | End: 2025-06-06 | Stop reason: HOSPADM

## 2025-06-04 RX ORDER — SODIUM CHLORIDE 9 MG/ML
INJECTION, SOLUTION INTRAVENOUS CONTINUOUS
Status: ACTIVE | OUTPATIENT
Start: 2025-06-04 | End: 2025-06-04

## 2025-06-04 RX ORDER — ONDANSETRON 4 MG/1
4 TABLET, ORALLY DISINTEGRATING ORAL EVERY 8 HOURS PRN
Status: DISCONTINUED | OUTPATIENT
Start: 2025-06-04 | End: 2025-06-06 | Stop reason: HOSPADM

## 2025-06-04 RX ORDER — ACETAMINOPHEN 650 MG/1
650 SUPPOSITORY RECTAL EVERY 6 HOURS PRN
Status: DISCONTINUED | OUTPATIENT
Start: 2025-06-04 | End: 2025-06-06 | Stop reason: HOSPADM

## 2025-06-04 RX ORDER — FUROSEMIDE 20 MG/1
20 TABLET ORAL DAILY
Status: DISCONTINUED | OUTPATIENT
Start: 2025-06-04 | End: 2025-06-06 | Stop reason: HOSPADM

## 2025-06-04 RX ORDER — ASPIRIN 81 MG/1
81 TABLET ORAL 2 TIMES DAILY
Status: DISCONTINUED | OUTPATIENT
Start: 2025-06-04 | End: 2025-06-06 | Stop reason: HOSPADM

## 2025-06-04 RX ORDER — SODIUM CHLORIDE 9 MG/ML
INJECTION, SOLUTION INTRAVENOUS PRN
Status: CANCELLED | OUTPATIENT
Start: 2025-06-04

## 2025-06-04 RX ORDER — SPIRONOLACTONE 25 MG/1
25 TABLET ORAL DAILY
Status: DISCONTINUED | OUTPATIENT
Start: 2025-06-04 | End: 2025-06-06 | Stop reason: HOSPADM

## 2025-06-04 RX ADMIN — LEVOTHYROXINE SODIUM 100 MCG: 0.1 TABLET ORAL at 18:17

## 2025-06-04 RX ADMIN — OXYCODONE HYDROCHLORIDE AND ACETAMINOPHEN 1 TABLET: 5; 325 TABLET ORAL at 18:17

## 2025-06-04 RX ADMIN — ATORVASTATIN CALCIUM 20 MG: 20 TABLET, FILM COATED ORAL at 22:22

## 2025-06-04 RX ADMIN — ASPIRIN 81 MG: 81 TABLET, COATED ORAL at 18:18

## 2025-06-04 RX ADMIN — MORPHINE SULFATE 4 MG: 4 INJECTION INTRAVENOUS at 12:48

## 2025-06-04 RX ADMIN — SODIUM CHLORIDE: 9 INJECTION, SOLUTION INTRAVENOUS at 14:40

## 2025-06-04 RX ADMIN — FENTANYL CITRATE 50 MCG: 50 INJECTION INTRAMUSCULAR; INTRAVENOUS at 11:35

## 2025-06-04 RX ADMIN — SPIRONOLACTONE 25 MG: 25 TABLET, FILM COATED ORAL at 18:18

## 2025-06-04 RX ADMIN — SODIUM CHLORIDE, PRESERVATIVE FREE 10 ML: 5 INJECTION INTRAVENOUS at 22:22

## 2025-06-04 ASSESSMENT — LIFESTYLE VARIABLES
HOW MANY STANDARD DRINKS CONTAINING ALCOHOL DO YOU HAVE ON A TYPICAL DAY: 1 OR 2
HOW OFTEN DO YOU HAVE A DRINK CONTAINING ALCOHOL: PATIENT DECLINED
HOW OFTEN DO YOU HAVE A DRINK CONTAINING ALCOHOL: MONTHLY OR LESS
HOW MANY STANDARD DRINKS CONTAINING ALCOHOL DO YOU HAVE ON A TYPICAL DAY: PATIENT DECLINED
HOW OFTEN DO YOU HAVE A DRINK CONTAINING ALCOHOL: PATIENT DECLINED
HOW MANY STANDARD DRINKS CONTAINING ALCOHOL DO YOU HAVE ON A TYPICAL DAY: PATIENT DECLINED

## 2025-06-04 ASSESSMENT — PAIN DESCRIPTION - ORIENTATION: ORIENTATION: RIGHT

## 2025-06-04 ASSESSMENT — PATIENT HEALTH QUESTIONNAIRE - PHQ9
SUM OF ALL RESPONSES TO PHQ QUESTIONS 1-9: 2
2. FEELING DOWN, DEPRESSED OR HOPELESS: SEVERAL DAYS
SUM OF ALL RESPONSES TO PHQ QUESTIONS 1-9: 2
1. LITTLE INTEREST OR PLEASURE IN DOING THINGS: SEVERAL DAYS

## 2025-06-04 ASSESSMENT — PAIN SCALES - GENERAL
PAINLEVEL_OUTOF10: 7
PAINLEVEL_OUTOF10: 5
PAINLEVEL_OUTOF10: 10
PAINLEVEL_OUTOF10: 4

## 2025-06-04 ASSESSMENT — PAIN DESCRIPTION - LOCATION: LOCATION: HIP

## 2025-06-04 NOTE — ED NOTES
ED to inpatient nurses report      Chief Complaint:  Chief Complaint   Patient presents with    Bradycardia     Pt presents to ED from home via 72 for bradycardia and hypotension, fatigue, near syncopal episode, and diaphoresis while at a PCP's office for a routine checkup. Pt recently had hip surgery on 20 MAY 2025, is not on blood thinners but is on ASA. Pt denies SOB, fever, chills, injury/trauma, change in bowel/bladder function, loss of appetite, pain, or any other sx.  Pt is given 1 mg of atropine by M72 PTA for bradycardia of 35-50 bpm. IV established PTA, and pt was given 200 ml NS by M72 PTA with some improvement in his hypotension.     Pt is A&OX4, placed on full monitor, EKG done, 2nd IV established, changed into gown and given warm blankets. Pt is placed on [email or web address], code cart at bedside. Labs drawn, labeled, and sent to lab. Pt vitals WNL. White board updated, and patient is updated on plan of care.     MOA:     LOC: alert and orientated to name, place, date  Mobility: Requires assistance * 2  Oxygen Baseline: RA    Current needs required: RA   Pending ED orders: NA  Present condition: Stable    Why did the patient come to the ED? Hypotension, bradycardia   What is the plan? ADMIT  Any procedures or intervention occur? Atropine by EMS, fluids, pain control   Any safety concerns??    Mental Status:       Psych Assessment:   Psychosocial  Psychosocial (WDL): Within Defined Limits  Vital signs   Vitals:    06/04/25 1321 06/04/25 1402 06/04/25 1403 06/04/25 1413   BP:       Pulse: 50  55 51   Resp:       Temp:       TempSrc:       SpO2: 100% 100%  100%        Vitals:  Patient Vitals for the past 24 hrs:   BP Temp Temp src Pulse Resp SpO2   06/04/25 1413 -- -- -- 51 -- 100 %   06/04/25 1403 -- -- -- 55 -- --   06/04/25 1402 -- -- -- -- -- 100 %   06/04/25 1321 -- -- -- 50 -- 100 %   06/04/25 1320 -- -- -- 51 -- 100 %   06/04/25 1317 -- -- -- 54 -- 100 %   06/04/25 1245 (!) 123/47 -- -- 54 -- 99 %   06/04/25

## 2025-06-04 NOTE — ED NOTES
Pt is repositioned , given copious amounts of blankets and pillows in an attempt to make him more comfortable. Pt is repositioned, states he feels more comfortable at this time.

## 2025-06-04 NOTE — ED NOTES
Orthostatic BP and HR deferred until patient is more stable. Hemodynamically unstable at this time.

## 2025-06-04 NOTE — H&P
[facility]     Department of Internal Medicine - Staff Internal Medicine Teaching Service          ADMISSION NOTE/HISTORY AND PHYSICAL EXAMINATION   Date: 6/4/2025  Patient Name: Victor Hugo Lama  Date of admission: 6/4/2025 10:08 AM  YOB: 1960  PCP: Alondra Burgos MD  History Obtained From:  patient, family member - sister, electronic medical record    CHIEF COMPLAINT     Near syncope    HISTORY OF PRESENTING ILLNESS     The patient is a 64 y.o. 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 Patient states he does not remember what happened at the PCP office and last memory he has is of EMS arriving and bringing him to the hospital. History was provided by patient's sister at bedside who was accompanying patient at doctor's visit. She states patient became diaphoretic and pale and he was about to fall and they held him in the chair. Patient did not hit his head. Patient's HR was noted to be in 40s at the time and EMS arrived and they aPatient states he has a history of syncope and usually he has symptoms like profuse sweating before those episodes. Patient lives with his son and he states he takes 6 different medications and uses the daily  pill box and his son fills that for him. He denies any accidental overdose.    Initial Vitals on Presentation:  Temp: Afebrile, RR: 1 6, HR 50, /47, SPO2 100% on room air    Initial Course in the ED:   - Pt presented with above complaint, found to be bradycardic.   - Lab work showing elevated troponins although downtrending 116, 109, low hemoglobin 7.2, elevated creatinine 2.2 with baseline around 1.1  - Chest x-ray unremarkable, CT head unremarkable  - Patient  13.5 fL    NRBC Automated 0.0 0.0 per 100 WBC    RBC Morphology ANISOCYTOSIS PRESENT     RBC Morphology MACROCYTOSIS PRESENT     Neutrophils % 80 (H) 36 - 65 %    Lymphocytes % 10 (L) 24 - 43 %    Monocytes % 7 3 - 12 %    Eosinophils % 2 1 - 4 %    Basophils % 0 0 - 2 %    Immature Granulocytes % 0 0 %    Neutrophils Absolute 7.49 1.50 - 8.10 k/uL    Lymphocytes Absolute 0.98 (L) 1.10 - 3.70 k/uL    Monocytes Absolute 0.65 0.10 - 1.20 k/uL    Eosinophils Absolute 0.22 0.00 - 0.44 k/uL    Basophils Absolute 0.03 0.00 - 0.20 k/uL    Immature Granulocytes Absolute 0.04 0.00 - 0.30 k/uL   Comprehensive Metabolic Panel w/ Reflex to MG    Collection Time: 06/04/25 10:34 AM   Result Value Ref Range    Sodium 135 (L) 136 - 145 mmol/L    Potassium 4.4 3.7 - 5.3 mmol/L    Chloride 101 98 - 107 mmol/L    CO2 24 20 - 31 mmol/L    Anion Gap 10 9 - 16 mmol/L    Glucose 111 (H) 74 - 99 mg/dL    BUN 27 (H) 8 - 23 mg/dL    Creatinine 2.2 (H) 0.7 - 1.2 mg/dL    Est, Glom Filt Rate 32 (L) >60 mL/min/1.73m2    Calcium 9.7 8.6 - 10.4 mg/dL    Total Protein 7.2 6.6 - 8.7 g/dL    Albumin 3.8 3.5 - 5.2 g/dL    Albumin/Globulin Ratio 1.1 1.0 - 2.5    Total Bilirubin 0.2 0.0 - 1.2 mg/dL    Alkaline Phosphatase 74 40 - 129 U/L    ALT 12 10 - 50 U/L    AST 25 10 - 50 U/L   Troponin    Collection Time: 06/04/25 10:34 AM   Result Value Ref Range    Troponin, High Sensitivity 116 (HH) 0 - 22 ng/L   Magnesium    Collection Time: 06/04/25 10:34 AM   Result Value Ref Range    Magnesium 2.8 (H) 1.6 - 2.4 mg/dL   Brain Natriuretic Peptide    Collection Time: 06/04/25 10:34 AM   Result Value Ref Range    NT Pro- (H) 0 - 125 pg/mL   TSH reflex to FT4    Collection Time: 06/04/25 11:30 AM   Result Value Ref Range    TSH 0.96 0.27 - 4.20 uIU/mL   Troponin    Collection Time: 06/04/25 11:30 AM   Result Value Ref Range    Troponin, High Sensitivity 109 (HH) 0 - 22 ng/L   TYPE AND SCREEN    Collection Time: 06/04/25 11:40 AM   Result Value Ref Range

## 2025-06-04 NOTE — ED TRIAGE NOTES
Pt presents to ED from home via 72 for bradycardia and hypotension, fatigue, near syncopal episode, and diaphoresis while at a PCP's office for a routine checkup. Pt recently had hip surgery on 20 MAY 2025, is not on blood thinners but is on ASA. Pt denies SOB, fever, chills, injury/trauma, change in bowel/bladder function, loss of appetite, pain, or any other sx.  Pt is given 1 mg of atropine by M72 PTA for bradycardia of 35-50 bpm. IV established PTA, and pt was given 200 ml NS by M72 PTA with some improvement in his hypotension.     Pt is A&OX4, placed on full monitor, EKG done, 2nd IV established, changed into gown and given warm blankets. Pt is placed on [employer], code cart at bedside. Labs drawn, labeled, and sent to lab. Pt vitals WNL. White board updated, and patient is updated on plan of care.

## 2025-06-04 NOTE — ED NOTES
The following labs were labeled with appropriate pt sticker and tubed to lab:     [x] Blue     [x] Lavender   [] on ice  [x] Green/yellow  [x] Green/black [] on ice  [] [place]  [] on ice  [x] Yellow  [] Red  [] Pink  [] Type/ Screen  [] ABG  [] VBG    [] COVID-19 swab    [] Rapid  [] PCR  [] Flu swab  [] Peds Viral Panel     [] Urine Sample  [] Fecal Sample  [] Pelvic Cultures  [] Blood Cultures  [] X 2  [] STREP Cultures  [] Wound Cultures

## 2025-06-04 NOTE — CARE COORDINATION
Case Management Assessment  Initial Evaluation    Date/Time of Evaluation: 6/4/2025 2:57 PM  Assessment Completed by: HANY SUAREZ RN    If patient is discharged prior to next notation, then this note serves as note for discharge by case management.    Patient Name: Victor Hugo Lama                   YOB: 1960  Diagnosis: Bradycardia [R00.1]                   Date / Time: 6/4/2025 10:08 AM    Patient Admission Status: Inpatient   Readmission Risk (Low < 19, Mod (19-27), High > 27): No data recorded  Current PCP: Alondra Burgos MD  PCP verified by CM? Yes    Chart Reviewed: Yes      History Provided by: Patient  Patient Orientation: Alert and Oriented    Patient Cognition: Alert    Hospitalization in the last 30 days (Readmission):  Yes    If yes, Readmission Assessment in  Navigator will be completed.    Advance Directives:      Code Status: Full Code   Patient's Primary Decision Maker is:        Discharge Planning:    Patient lives with: Children Type of Home: House  Primary Care Giver: Self  Patient Support Systems include: Children   Current Financial resources:    Current community resources:    Current services prior to admission: Durable Medical Equipment            Current DME: Cane, Walker            Type of Home Care services:  None    ADLS  Prior functional level: Independent in ADLs/IADLs  Current functional level: Independent in ADLs/IADLs    PT AM-PAC:   /24  OT AM-PAC:   /24    Family can provide assistance at DC: Yes  Would you like Case Management to discuss the discharge plan with any other family members/significant others, and if so, who? No  Plans to Return to Present Housing: Yes  Other Identified Issues/Barriers to RETURNING to current housing: none  Potential Assistance needed at discharge: N/A            Potential DME:    Patient expects to discharge to: House  Plan for transportation at discharge:      Financial    Payor: [employer] MEDICARE / Plan: [employer] CHOICE-PPO MEDICARE  / Product Type: *No Product type* /     Does insurance require precert for SNF: Yes    Potential assistance Purchasing Medications: No  Meds-to-Beds request:        [facility] - [place], [place] - [address] [facility] [address] [phone] - F [phone]  [address] [address]  Phone: [phone] Fax: [phone]      Notes:    Factors facilitating achievement of predicted outcomes: Family support    Barriers to discharge: Medical complications    Additional Case Management Notes: home with son    The Plan for Transition of Care is related to the following treatment goals of Bradycardia [R00.1]    IF APPLICABLE: The Patient and/or patient representative Victor Hugo and his family were provided with a choice of provider and agrees with the discharge plan. Freedom of choice list with basic dialogue that supports the patient's individualized plan of care/goals and shares the quality data associated with the providers was provided to:     Patient Representative Name:       The Patient and/or Patient Representative Agree with the Discharge Plan?      HANY SUAREZ RN  Case Management Department  Ph:  Fax:

## 2025-06-04 NOTE — ED NOTES
Pt states he is unsure if he would be willing to get a blood transfusion if it is needed. Pt is concerned over safety of blood admin.  At this time, we are only obtaining a type and screen to know what kind of blood type he is. Dr. Obregon is notified of patient's concerns regarding blood admin if it should be needed.

## 2025-06-04 NOTE — ED NOTES
The following labs were labeled with appropriate pt sticker and tubed to lab:     [] Blue     [x] Lavender   [] on ice  [] Green/yellow  [] Green/black [] on ice  [] Lancaster  [] on ice  [x] Yellow  [] Red  [] Pink  [] Type/ Screen  [] ABG  [] VBG    [] COVID-19 swab    [] Rapid  [] PCR  [] Flu swab  [] Peds Viral Panel     [] Urine Sample  [] Fecal Sample  [] Pelvic Cultures  [] Blood Cultures  [] X 2  [] STREP Cultures  [] Wound Cultures

## 2025-06-04 NOTE — ED PROVIDER NOTES
[facility] EMERGENCY DEPARTMENT  Emergency Department Encounter  Emergency Medicine      Pt Name:Victor Hugo Lama  MRN: [id number]  Birthdate 1960  Date of evaluation: 6/4/25  PCP:  Alondra Burgos MD  10:17 AM EDT      CHIEF COMPLAINT       Chief Complaint   Patient presents with    Bradycardia       HISTORY OF PRESENT ILLNESS  (Location/Symptom, Timing/Onset, Context/Setting, Quality, Duration, Modifying Factors, Severity.)      Victor Hugo Lama is a 64 y.o. male who presents with evaluation PCP office today where the patient had a near syncopal episode was noted to be hypotensive and bradycardic in the 40s.  Was pale and diaphoretic.  He does have a recent right hip surgery that was done on May 20.  But the patient has been ambulatory pain has been controlled.  He has a history of a aortic valve replacement.  His last echo in August 2024 showed an EF of 45%  Has known history of left bundle branch block which is chronic.  Patient received a single dose of atropine en route with improvement of symptoms.  PAST MEDICAL / SURGICAL / SOCIAL / FAMILY HISTORY      has a past medical history of Acute systolic congestive heart failure (HCC), Alcohol abuse, Alcohol abuse, Anxiety, Chronic combined systolic (congestive) and diastolic (congestive) heart failure (HCC), Hyperlipidemia, Hypertension, Hypothyroid, Murmur, Osteoarthritis, Smoking, and Vasovagal reaction.       has a past surgical history that includes other surgical history (08/24/2021); amputation (Right); Cardiac catheterization; and Total hip arthroplasty (Right, 5/20/2025).      Social History     Socioeconomic History    Marital status: Single     Spouse name: Not on file    Number of children: Not on file    Years of education: Not on file    Highest education level: Not on file   Occupational History    Not on file   Tobacco Use    Smoking status: Former     Current packs/day: 0.00     Average packs/day: 0.8 packs/day for 40.0 years (30.0 ttl

## 2025-06-05 ENCOUNTER — APPOINTMENT (OUTPATIENT)
Age: 65
End: 2025-06-05
Payer: MEDICARE

## 2025-06-05 ENCOUNTER — APPOINTMENT (OUTPATIENT)
Dept: ULTRASOUND IMAGING | Age: 65
End: 2025-06-05
Payer: MEDICARE

## 2025-06-05 PROBLEM — D64.9 ANEMIA: Status: ACTIVE | Noted: 2025-06-05

## 2025-06-05 PROBLEM — R79.89 ELEVATED TROPONIN: Status: ACTIVE | Noted: 2025-06-05

## 2025-06-05 PROBLEM — R55 SYNCOPE: Status: ACTIVE | Noted: 2025-06-05

## 2025-06-05 LAB
ANION GAP SERPL CALCULATED.3IONS-SCNC: 10 MMOL/L (ref 9–16)
BASOPHILS # BLD: 0.03 K/UL (ref 0–0.2)
BASOPHILS NFR BLD: 0 % (ref 0–2)
BUN SERPL-MCNC: 25 MG/DL (ref 8–23)
CALCIUM SERPL-MCNC: 9.3 MG/DL (ref 8.6–10.4)
CHLORIDE SERPL-SCNC: 104 MMOL/L (ref 98–107)
CO2 SERPL-SCNC: 21 MMOL/L (ref 20–31)
CREAT SERPL-MCNC: 1.7 MG/DL (ref 0.7–1.2)
ECHO AR MAX VEL PISA: 3.2 M/S
ECHO AV AREA PEAK VELOCITY: 1.5 CM2
ECHO AV AREA VTI: 1.5 CM2
ECHO AV AREA/BSA PEAK VELOCITY: 0.7 CM2/M2
ECHO AV AREA/BSA VTI: 0.7 CM2/M2
ECHO AV MEAN GRADIENT: 9 MMHG
ECHO AV MEAN VELOCITY: 1.4 M/S
ECHO AV PEAK GRADIENT: 21 MMHG
ECHO AV PEAK VELOCITY: 2.3 M/S
ECHO AV REGURGITANT PHT: 574 MS
ECHO AV VELOCITY RATIO: 0.52
ECHO AV VTI: 51.3 CM
ECHO BSA: 2.07 M2
ECHO LV EDV A2C: 178 ML
ECHO LV EDV A4C: 169 ML
ECHO LV EDV INDEX A4C: 82 ML/M2
ECHO LV EDV NDEX A2C: 87 ML/M2
ECHO LV EF PHYSICIAN: 40 %
ECHO LV EJECTION FRACTION A2C: 41 %
ECHO LV EJECTION FRACTION A4C: 51 %
ECHO LV EJECTION FRACTION BIPLANE: 47 % (ref 55–100)
ECHO LV ESV A2C: 105 ML
ECHO LV ESV A4C: 82 ML
ECHO LV ESV INDEX A2C: 51 ML/M2
ECHO LV ESV INDEX A4C: 40 ML/M2
ECHO LVOT AREA: 2.8 CM2
ECHO LVOT AV VTI INDEX: 0.54
ECHO LVOT DIAM: 1.9 CM
ECHO LVOT MEAN GRADIENT: 3 MMHG
ECHO LVOT PEAK GRADIENT: 6 MMHG
ECHO LVOT PEAK VELOCITY: 1.2 M/S
ECHO LVOT STROKE VOLUME INDEX: 38 ML/M2
ECHO LVOT SV: 77.9 ML
ECHO LVOT VTI: 27.5 CM
EKG ATRIAL RATE: 54 BPM
EKG ATRIAL RATE: 69 BPM
EKG P AXIS: 45 DEGREES
EKG P AXIS: 51 DEGREES
EKG P-R INTERVAL: 184 MS
EKG P-R INTERVAL: 236 MS
EKG Q-T INTERVAL: 490 MS
EKG Q-T INTERVAL: 534 MS
EKG QRS DURATION: 196 MS
EKG QRS DURATION: 206 MS
EKG QTC CALCULATION (BAZETT): 506 MS
EKG QTC CALCULATION (BAZETT): 525 MS
EKG R AXIS: 59 DEGREES
EKG R AXIS: 7 DEGREES
EKG T AXIS: 115 DEGREES
EKG T AXIS: 159 DEGREES
EKG VENTRICULAR RATE: 54 BPM
EKG VENTRICULAR RATE: 69 BPM
EOSINOPHIL # BLD: 0.19 K/UL (ref 0–0.44)
EOSINOPHILS RELATIVE PERCENT: 2 % (ref 1–4)
ERYTHROCYTE [DISTWIDTH] IN BLOOD BY AUTOMATED COUNT: 19.4 % (ref 11.8–14.4)
GFR, ESTIMATED: 44 ML/MIN/1.73M2
GLUCOSE SERPL-MCNC: 96 MG/DL (ref 74–99)
HCT VFR BLD AUTO: 26.6 % (ref 40.7–50.3)
HGB BLD-MCNC: 8 G/DL (ref 13–17)
IMM GRANULOCYTES # BLD AUTO: 0.04 K/UL (ref 0–0.3)
IMM GRANULOCYTES NFR BLD: 1 %
LYMPHOCYTES NFR BLD: 1.2 K/UL (ref 1.1–3.7)
LYMPHOCYTES RELATIVE PERCENT: 14 % (ref 24–43)
MCH RBC QN AUTO: 30.3 PG (ref 25.2–33.5)
MCHC RBC AUTO-ENTMCNC: 30.1 G/DL (ref 28.4–34.8)
MCV RBC AUTO: 100.8 FL (ref 82.6–102.9)
MONOCYTES NFR BLD: 0.67 K/UL (ref 0.1–1.2)
MONOCYTES NFR BLD: 8 % (ref 3–12)
NEUTROPHILS NFR BLD: 75 % (ref 36–65)
NEUTS SEG NFR BLD: 6.47 K/UL (ref 1.5–8.1)
NRBC BLD-RTO: 0 PER 100 WBC
PLATELET # BLD AUTO: 389 K/UL (ref 138–453)
PMV BLD AUTO: 10.1 FL (ref 8.1–13.5)
POTASSIUM SERPL-SCNC: 4.3 MMOL/L (ref 3.7–5.3)
RBC # BLD AUTO: 2.64 M/UL (ref 4.21–5.77)
RBC # BLD: ABNORMAL 10*6/UL
SODIUM SERPL-SCNC: 135 MMOL/L (ref 136–145)
WBC OTHER # BLD: 8.6 K/UL (ref 3.5–11.3)

## 2025-06-05 PROCEDURE — 93010 ELECTROCARDIOGRAM REPORT: CPT | Performed by: INTERNAL MEDICINE

## 2025-06-05 PROCEDURE — 36430 TRANSFUSION BLD/BLD COMPNT: CPT

## 2025-06-05 PROCEDURE — 99232 SBSQ HOSP IP/OBS MODERATE 35: CPT | Performed by: STUDENT IN AN ORGANIZED HEALTH CARE EDUCATION/TRAINING PROGRAM

## 2025-06-05 PROCEDURE — 93325 DOPPLER ECHO COLOR FLOW MAPG: CPT | Performed by: INTERNAL MEDICINE

## 2025-06-05 PROCEDURE — 80048 BASIC METABOLIC PNL TOTAL CA: CPT

## 2025-06-05 PROCEDURE — 2580000003 HC RX 258

## 2025-06-05 PROCEDURE — 93321 DOPPLER ECHO F-UP/LMTD STD: CPT

## 2025-06-05 PROCEDURE — 76770 US EXAM ABDO BACK WALL COMP: CPT

## 2025-06-05 PROCEDURE — 1200000000 HC SEMI PRIVATE

## 2025-06-05 PROCEDURE — P9016 RBC LEUKOCYTES REDUCED: HCPCS

## 2025-06-05 PROCEDURE — 97535 SELF CARE MNGMENT TRAINING: CPT

## 2025-06-05 PROCEDURE — 93321 DOPPLER ECHO F-UP/LMTD STD: CPT | Performed by: INTERNAL MEDICINE

## 2025-06-05 PROCEDURE — 85025 COMPLETE CBC W/AUTO DIFF WBC: CPT

## 2025-06-05 PROCEDURE — 93308 TTE F-UP OR LMTD: CPT | Performed by: INTERNAL MEDICINE

## 2025-06-05 PROCEDURE — 6370000000 HC RX 637 (ALT 250 FOR IP)

## 2025-06-05 PROCEDURE — 36415 COLL VENOUS BLD VENIPUNCTURE: CPT

## 2025-06-05 PROCEDURE — 97166 OT EVAL MOD COMPLEX 45 MIN: CPT

## 2025-06-05 PROCEDURE — 97530 THERAPEUTIC ACTIVITIES: CPT

## 2025-06-05 PROCEDURE — 2500000003 HC RX 250 WO HCPCS

## 2025-06-05 PROCEDURE — 99222 1ST HOSP IP/OBS MODERATE 55: CPT | Performed by: INTERNAL MEDICINE

## 2025-06-05 PROCEDURE — 30233N1 TRANSFUSION OF NONAUTOLOGOUS RED BLOOD CELLS INTO PERIPHERAL VEIN, PERCUTANEOUS APPROACH: ICD-10-PCS | Performed by: INTERNAL MEDICINE

## 2025-06-05 RX ORDER — MULTIVIT-MIN/FERROUS GLUCONATE 9 MG/15 ML
15 LIQUID (ML) ORAL DAILY
Status: DISCONTINUED | OUTPATIENT
Start: 2025-06-05 | End: 2025-06-06 | Stop reason: HOSPADM

## 2025-06-05 RX ORDER — LANOLIN ALCOHOL/MO/W.PET/CERES
100 CREAM (GRAM) TOPICAL DAILY
Status: DISCONTINUED | OUTPATIENT
Start: 2025-06-05 | End: 2025-06-06 | Stop reason: HOSPADM

## 2025-06-05 RX ADMIN — ATORVASTATIN CALCIUM 20 MG: 20 TABLET, FILM COATED ORAL at 20:45

## 2025-06-05 RX ADMIN — SODIUM CHLORIDE: 0.9 INJECTION, SOLUTION INTRAVENOUS at 03:21

## 2025-06-05 RX ADMIN — LEVOTHYROXINE SODIUM 100 MCG: 0.1 TABLET ORAL at 08:32

## 2025-06-05 RX ADMIN — ASPIRIN 81 MG: 81 TABLET, COATED ORAL at 08:32

## 2025-06-05 RX ADMIN — Medication 100 MG: at 08:32

## 2025-06-05 RX ADMIN — SODIUM CHLORIDE, PRESERVATIVE FREE 10 ML: 5 INJECTION INTRAVENOUS at 20:45

## 2025-06-05 RX ADMIN — OXYCODONE HYDROCHLORIDE AND ACETAMINOPHEN 1 TABLET: 5; 325 TABLET ORAL at 10:46

## 2025-06-05 RX ADMIN — Medication 15 ML: at 08:57

## 2025-06-05 RX ADMIN — ASPIRIN 81 MG: 81 TABLET, COATED ORAL at 18:03

## 2025-06-05 RX ADMIN — OXYCODONE HYDROCHLORIDE AND ACETAMINOPHEN 1 TABLET: 5; 325 TABLET ORAL at 20:45

## 2025-06-05 RX ADMIN — SODIUM CHLORIDE, PRESERVATIVE FREE 10 ML: 5 INJECTION INTRAVENOUS at 08:33

## 2025-06-05 RX ADMIN — SPIRONOLACTONE 25 MG: 25 TABLET, FILM COATED ORAL at 08:32

## 2025-06-05 ASSESSMENT — PAIN DESCRIPTION - DESCRIPTORS: DESCRIPTORS: NUMBNESS;CRAMPING

## 2025-06-05 ASSESSMENT — PAIN DESCRIPTION - LOCATION
LOCATION: HIP
LOCATION: BACK

## 2025-06-05 ASSESSMENT — PAIN SCALES - GENERAL
PAINLEVEL_OUTOF10: 7
PAINLEVEL_OUTOF10: 5
PAINLEVEL_OUTOF10: 0
PAINLEVEL_OUTOF10: 7
PAINLEVEL_OUTOF10: 0

## 2025-06-05 ASSESSMENT — PAIN DESCRIPTION - ORIENTATION
ORIENTATION: RIGHT
ORIENTATION: LOWER

## 2025-06-05 ASSESSMENT — PAIN SCALES - WONG BAKER: WONGBAKER_NUMERICALRESPONSE: NO HURT

## 2025-06-05 NOTE — PROGRESS NOTES
[facility]  Internal Medicine Teaching Residency Program  Inpatient Daily Progress Note  ______________________________________________________________________________    Patient: Victor Hugo Lama  YOB: 1960   MRN:[id number]    Acct: [id number]     Room: 2017/2017-01  Admit date: 6/4/2025  Today's date: 06/05/25  Number of days in the hospital: 1    SUBJECTIVE   Admitting Diagnosis: Bradycardia  CC:   Chief Complaint   Patient presents with    Bradycardia        Pt examined at bedside. Chart & results reviewed.   Patient required 1 uPRBC overnight, Hb is 8 this morning.  Patient continues to be bradycardic although asymptomatic.    Intake/Output Summary (Last 24 hours) at 6/5/2025 0638  Last data filed at 6/5/2025 0511  Gross per 24 hour   Intake 638 ml   Output 625 ml   Net 13 ml      Review of Systems   All other systems reviewed and are negative.        BRIEF HISTORY     The patient is a 64 y.o. 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 Patient states he does not remember what happened at the PCP office and last memory he has is of EMS arriving and bringing him to the hospital. History was provided by patient's sister at bedside who was accompanying patient at doctor's visit. She states patient became diaphoretic and pale and he was about to fall and they held him in the chair. Patient did not hit his head. Patient's HR was noted to be in 40s at the time and EMS arrived and they aPatient states he has a history of syncope and usually he has symptoms like profuse sweating before those episodes. Patient lives with his son and he states he takes 6 different medications and uses the daily

## 2025-06-05 NOTE — PROGRESS NOTES
Rolling;Cane  Receives Help From: Family  Prior Level of Assist for ADLs: Independent  Prior Level of Assist for Homemaking: Independent  Homemaking Responsibilities: Yes  Prior Level of Assist for Ambulation: Independent household ambulator, with or without device  Prior Level of Assist for Transfers: Independent  Active : Yes  Patient's  Info: normally drives Van however has not been able to drives for months and children have been managing  Occupation: Retired  Type of Occupation:   Additional Comments: Son sleeps in day,works at night    Vision/Hearing  Vision  Vision: Impaired  Vision Exceptions: Wears glasses for reading  Hearing  Hearing: Within functional limits    BUE Assessment  Gross Assessment  Strength: Generally decreased, functional (BUE strength grossly 4/5)  Hand Dominance: Right     Objective  Orientation  Overall Orientation Status: Within Normal Limits  Orientation Level: Oriented X4  Cognition  Overall Cognitive Status: Exceptions  Arousal/Alertness: Appears intact  Following Commands: Appears intact  Attention Span: Attends with cues to redirect  Memory: Decreased recall of recent events  Safety Judgement: Decreased awareness of need for safety  Problem Solving: Decreased awareness of errors  Insights: Decreased awareness of deficits  Initiation: Appears intact  Sequencing: Appears intact  Cognition Comment: Handout for anterior precautions    Activities of Daily Living  Feeding: Independent  Grooming: Setup  Grooming Skilled Clinical Factors: Requires assist to set up, normally indep  UE Bathing: Contact guard assistance;Based on clinical judgement  LE Bathing: Minimal assistance;Based on clinical judgement  UE Dressing: Setup;Stand by assistance  UE Dressing Skilled Clinical Factors: Seated at edge of bed  LE Dressing: Minimal assistance  LE Dressing Skilled Clinical Factors: Requires assist to thread LEs into socks, reports normally using sock aide, anticpate can  training, Functional mobility training, Endurance training, Cognitive reorientation, Pain management, Patient/Caregiver education & training, Equipment evaluation, education, & procurement, Positioning, Self-Care / ADL, Home management training    Minutes  OT Individual Minutes  Time In: 1450  Time Out: 1525  Minutes: 35  Time Code Minutes   Timed Code Treatment Minutes: 25 Minutes    Electronically signed by MARGARITO Mckeon on 6/5/25 at 5:34 PM EDT

## 2025-06-05 NOTE — PLAN OF CARE
Problem: Chronic Conditions and Co-morbidities  Goal: Patient's chronic conditions and co-morbidity symptoms are monitored and maintained or improved  6/5/2025 0652 by Carisa Morris RN  Outcome: Progressing  6/4/2025 1911 by Denisa Clifford RN  Outcome: Progressing     Problem: Discharge Planning  Goal: Discharge to home or other facility with appropriate resources  6/5/2025 0652 by Carisa Morris RN  Outcome: Progressing  6/4/2025 1911 by Denisa Clifford RN  Outcome: Progressing     Problem: ABCDS Injury Assessment  Goal: Absence of physical injury  6/5/2025 0652 by Carisa Morris RN  Outcome: Progressing  6/4/2025 1911 by Denisa Clifford RN  Outcome: Progressing     Problem: Safety - Adult  Goal: Free from fall injury  6/5/2025 0652 by Carisa Morris RN  Outcome: Progressing  6/4/2025 1911 by Denisa Clifford RN  Outcome: Progressing

## 2025-06-05 NOTE — CONSULTS
[facility]    Consult / H&P               Today's Date: 6/5/2025  Patient Name: Victor Hugo Lama  Date of admission: 6/4/2025 10:08 AM  Patient's age: 64 y.o., 1960  Admission Dx: Bradycardia [R00.1]  Elevated troponin [R79.89]  Syncope, unspecified syncope type [R55]  Anemia, unspecified type [D64.9]    Requesting Physician: Jessenia Pompa MD    Cardiac Evaluation Reason:  Elevated troponins, syncope    History Obtained From: patient and chart review     History of Present Illness:    64-year-old male with past medical history of HFmrEF, hypertension, TAVR, recent right hip replacement due to osteoarthritis on 05/2025, R ICA occlusion, and LICA mild stenosis presented to ED with chief complaint of near syncope in PCP office patient also reported syncopal episodes at home.  Symptom worsening since hip surgery.  Patient was noted to be bradycardic by EMS and was given a dose of atropine.  Patient noted prodromal symptoms prior to presyncope/syncope.  High-sensitivity troponins elevated.  Cardiology consulted for evaluation    Past Medical History:   has a past medical history of Acute systolic congestive heart failure (HCC), Alcohol abuse, Alcohol abuse, Anxiety, Chronic combined systolic (congestive) and diastolic (congestive) heart failure (HCC), Hyperlipidemia, Hypertension, Hypothyroid, Murmur, Osteoarthritis, Smoking, and Vasovagal reaction.    Past Surgical History:   has a past surgical history that includes other surgical history (08/24/2021); amputation (Right); Cardiac catheterization; and Total hip arthroplasty (Right, 5/20/2025).     Home Medications:    Prior to Admission medications    Medication Sig Start Date End Date Taking? Authorizing Provider   HYDROcodone-acetaminophen (NORCO) 7.5-325 MG per tablet Take 1 tablet by mouth 2 times daily for 30 days. Intended supply: 30 days Max Daily Amount: 2 tablets 6/3/25 7/3/25  Alondra Burgos MD   oxyCODONE-acetaminophen (PERCOCET) 5-325  the assessment, plan and orders as documented by the resident With changes made to the note.     Patient does not need pacemaker now, Holter monitor on discharge or outpatient, outpatient follow-up with cardiology, stop Coreg on discharge and increase the dose of losartan to 50 mg daily from 25 mg daily.    Electronically signed by Yoel Thompson MD on 6/5/2025 at 1:11 PM.    [facility]      [phone]

## 2025-06-05 NOTE — PLAN OF CARE
Problem: Chronic Conditions and Co-morbidities  Goal: Patient's chronic conditions and co-morbidity symptoms are monitored and maintained or improved  6/5/2025 1944 by Denisa Clifford RN  Outcome: Progressing  6/5/2025 0652 by Carisa Morris RN  Outcome: Progressing     Problem: Discharge Planning  Goal: Discharge to home or other facility with appropriate resources  6/5/2025 1944 by Denisa Clifford RN  Outcome: Progressing  6/5/2025 0652 by Carisa Morris RN  Outcome: Progressing     Problem: ABCDS Injury Assessment  Goal: Absence of physical injury  6/5/2025 1944 by Denisa Clifford RN  Outcome: Progressing  6/5/2025 0652 by Carisa Morris RN  Outcome: Progressing     Problem: Safety - Adult  Goal: Free from fall injury  6/5/2025 1944 by Denisa Clifford RN  Outcome: Progressing  6/5/2025 0652 by Carisa Morris RN  Outcome: Progressing

## 2025-06-06 ENCOUNTER — APPOINTMENT (OUTPATIENT)
Age: 65
End: 2025-06-06
Payer: MEDICARE

## 2025-06-06 VITALS
OXYGEN SATURATION: 100 % | TEMPERATURE: 98 F | SYSTOLIC BLOOD PRESSURE: 144 MMHG | HEART RATE: 51 BPM | BODY MASS INDEX: 27.35 KG/M2 | RESPIRATION RATE: 12 BRPM | DIASTOLIC BLOOD PRESSURE: 43 MMHG | WEIGHT: 191 LBS | HEIGHT: 70 IN

## 2025-06-06 LAB
ABO/RH: NORMAL
ANTIBODY SCREEN: NEGATIVE
ARM BAND NUMBER: NORMAL
BLOOD BANK BLOOD PRODUCT EXPIRATION DATE: NORMAL
BLOOD BANK DISPENSE STATUS: NORMAL
BLOOD BANK ISBT PRODUCT BLOOD TYPE: 5100
BLOOD BANK PRODUCT CODE: NORMAL
BLOOD BANK SAMPLE EXPIRATION: NORMAL
BLOOD BANK UNIT TYPE AND RH: NORMAL
BPU ID: NORMAL
COMPONENT: NORMAL
CROSSMATCH RESULT: NORMAL
ECHO BSA: 2.07 M2
HCT VFR BLD AUTO: 28.2 % (ref 40.7–50.3)
HGB BLD-MCNC: 8.6 G/DL (ref 13–17)
TRANSFUSION STATUS: NORMAL
UNIT DIVISION: 0
UNIT ISSUE DATE/TIME: NORMAL

## 2025-06-06 PROCEDURE — 99239 HOSP IP/OBS DSCHRG MGMT >30: CPT | Performed by: INTERNAL MEDICINE

## 2025-06-06 PROCEDURE — 2580000003 HC RX 258

## 2025-06-06 PROCEDURE — 36415 COLL VENOUS BLD VENIPUNCTURE: CPT

## 2025-06-06 PROCEDURE — 2500000003 HC RX 250 WO HCPCS

## 2025-06-06 PROCEDURE — 85014 HEMATOCRIT: CPT

## 2025-06-06 PROCEDURE — 93242 EXT ECG>48HR<7D RECORDING: CPT

## 2025-06-06 PROCEDURE — 6360000002 HC RX W HCPCS

## 2025-06-06 PROCEDURE — 6370000000 HC RX 637 (ALT 250 FOR IP)

## 2025-06-06 PROCEDURE — 99233 SBSQ HOSP IP/OBS HIGH 50: CPT | Performed by: NURSE PRACTITIONER

## 2025-06-06 PROCEDURE — 85018 HEMOGLOBIN: CPT

## 2025-06-06 RX ORDER — FERROUS SULFATE 325(65) MG
325 TABLET, DELAYED RELEASE (ENTERIC COATED) ORAL
Status: DISCONTINUED | OUTPATIENT
Start: 2025-06-07 | End: 2025-06-06 | Stop reason: HOSPADM

## 2025-06-06 RX ADMIN — IRON SUCROSE 400 MG: 20 INJECTION, SOLUTION INTRAVENOUS at 10:34

## 2025-06-06 RX ADMIN — SPIRONOLACTONE 25 MG: 25 TABLET, FILM COATED ORAL at 08:42

## 2025-06-06 RX ADMIN — ASPIRIN 81 MG: 81 TABLET, COATED ORAL at 08:42

## 2025-06-06 RX ADMIN — OXYCODONE HYDROCHLORIDE AND ACETAMINOPHEN 1 TABLET: 5; 325 TABLET ORAL at 03:13

## 2025-06-06 RX ADMIN — SODIUM CHLORIDE, PRESERVATIVE FREE 10 ML: 5 INJECTION INTRAVENOUS at 08:42

## 2025-06-06 RX ADMIN — OXYCODONE HYDROCHLORIDE AND ACETAMINOPHEN 1 TABLET: 5; 325 TABLET ORAL at 13:13

## 2025-06-06 RX ADMIN — Medication 100 MG: at 08:42

## 2025-06-06 RX ADMIN — EMPAGLIFLOZIN 10 MG: 10 TABLET, FILM COATED ORAL at 08:42

## 2025-06-06 RX ADMIN — LEVOTHYROXINE SODIUM 100 MCG: 0.1 TABLET ORAL at 06:36

## 2025-06-06 RX ADMIN — Medication 15 ML: at 08:42

## 2025-06-06 ASSESSMENT — PAIN SCALES - GENERAL
PAINLEVEL_OUTOF10: 6
PAINLEVEL_OUTOF10: 0
PAINLEVEL_OUTOF10: 7

## 2025-06-06 ASSESSMENT — PAIN SCALES - WONG BAKER: WONGBAKER_NUMERICALRESPONSE: NO HURT

## 2025-06-06 NOTE — PLAN OF CARE
Problem: Chronic Conditions and Co-morbidities  Goal: Patient's chronic conditions and co-morbidity symptoms are monitored and maintained or improved  6/6/2025 0240 by Carisa Morris RN  Outcome: Progressing  6/5/2025 1944 by Denisa Clifford RN  Outcome: Progressing     Problem: Discharge Planning  Goal: Discharge to home or other facility with appropriate resources  6/6/2025 0240 by Carisa Morris RN  Outcome: Progressing  6/5/2025 1944 by Denisa Clifford RN  Outcome: Progressing     Problem: ABCDS Injury Assessment  Goal: Absence of physical injury  6/6/2025 0240 by Carisa Morris RN  Outcome: Progressing  6/5/2025 1944 by Denisa Clifford RN  Outcome: Progressing     Problem: Safety - Adult  Goal: Free from fall injury  6/6/2025 0240 by Carisa Morris RN  Outcome: Progressing  6/5/2025 1944 by Denisa Clifford RN  Outcome: Progressing

## 2025-06-06 NOTE — CARE COORDINATION
Case Management   Daily Progress Note       Patient Name: Victor Hugo Lama                   YOB: 1960  Diagnosis: Bradycardia [R00.1]  Elevated troponin [R79.89]  Syncope, unspecified syncope type [R55]  Anemia, unspecified type [D64.9]                       GMLOS: 3.4 days  Length of Stay: 2  days    Anticipated Discharge Date: Ready for discharge    Readmission Risk (Low < 19, Mod (19-27), High > 27): Readmission Risk Score: 14        Current Transitional Plan    [x] Home Independently    [] Home with HC    [] Skilled Nursing Facility    [] Acute Rehabilitation    [] Long Term Acute Care (LTAC)    [] Other:     Plan for the Stay (Medical Management) : n/a      Workflow Continuation (Additional Notes) : patient returning home with his son. He recently finished home care for therapy and will start outpatient on Monday. He denies needs for home and has transportation home        Анна Holder RN  June 6, 2025

## 2025-06-06 NOTE — PROGRESS NOTES
[facility]   Progress Note                   Date:   6/6/2025  Patient name: Victor Hugo Lama  Date of admission:  6/4/2025 10:08 AM  MRN:   [id number]  YOB: 1960  PCP: Alondra Burgos MD    Reason for Admission: Bradycardia [R00.1]  Elevated troponin [R79.89]  Syncope, unspecified syncope type [R55]  Anemia, unspecified type [D64.9]    Subjective:       Clinical Changes / Abnormalities: Pt seen and examined in the room.  Pt denies any CP or sob.  Labs, vitals and tele reviewed-  Pt states that he needs to go home.  Hr stable.        Medications:   Scheduled Meds:   iron sucrose  400 mg IntraVENous Once    [START ON 6/7/2025] ferrous sulfate  325 mg Oral Lunch    empagliflozin  10 mg Oral Daily    thiamine  100 mg Oral Daily    CENTRUM/CERTA-GISSELLE with minerals oral  15 mL Oral Daily    sodium chloride flush  5-40 mL IntraVENous 2 times per day    aspirin  81 mg Oral BID    atorvastatin  20 mg Oral Nightly    [Held by provider] furosemide  20 mg Oral Daily    levothyroxine  100 mcg Oral Daily    [Held by provider] losartan  25 mg Oral Daily    spironolactone  25 mg Oral Daily     Continuous Infusions:   sodium chloride       CBC:   Recent Labs     06/04/25  1034 06/04/25  2043 06/05/25  0419 06/06/25  0116   WBC 9.4  --  8.6  --    HGB 7.2* 6.8* 8.0* 8.6*   *  --  389  --      BMP:    Recent Labs     06/04/25  1034 06/05/25  0419   * 135*   K 4.4 4.3    104   CO2 24 21   BUN 27* 25*   CREATININE 2.2* 1.7*   GLUCOSE 111* 96     Hepatic:   Recent Labs     06/04/25  1034   AST 25   ALT 12   BILITOT 0.2   ALKPHOS 74     Troponin:   Recent Labs     06/04/25  1034 06/04/25  1130   TROPHS 116* 109*     BNP: No results for input(s): \"BNP\" in the last 72 hours.  Lipids: No results for input(s): \"CHOL\", \"HDL\" in the last 72 hours.    Invalid input(s): \"LDLCALCU\"  INR: No results for input(s): \"INR\" in the last 72 hours.    ECHo 6/5/25    Left Ventricle: Mildly reduced left  2021    Patient Active Problem List:     Sciatica     Low back pain     Gout     Other ankle sprain and strain     Other testicular hypofunction     Generalized osteoarthrosis, unspecified site     Enthesopathy     Hypothyroid     Aortic stenosis     Polycythemia     Chronic combined systolic (congestive) and diastolic (congestive) heart failure ([facility])     Valvular heart disease     Family history of premature CAD     Anxiety     Coronary artery disease involving native coronary artery without angina pectoris     Former smoker     Stenosis of right carotid artery     Mediastinal lymphadenopathy     COPD (chronic obstructive pulmonary disease) ([facility])     S/P TAVR (transcatheter aortic valve replacement)     Chronic, continuous use of opioids     Hip pain, chronic, right     Osteoarthritis of right hip     Seizure ([facility])     Primary osteoarthritis of right hip     Bradycardia     Syncope     Elevated troponin     Anemia      Plan of Treatment:   ECHO reviewed and stable.    BB discontinued.    Continue losartan.   Holter monitor ordered.   Follow up as outpt     Electronically signed by WILLIAM DIANA CNP on 6/6/2025 at 12:34 PM  [facility]  [phone]

## 2025-06-06 NOTE — DISCHARGE INSTR - DIET

## 2025-06-06 NOTE — PROGRESS NOTES
Pt wheeled down to car with son, pt believes they have all of their belongings. All questions answered regarding discharge. Extensive education was given about the importance of repositioning in bed to prevent bedsores.

## 2025-06-06 NOTE — PLAN OF CARE
Problem: Chronic Conditions and Co-morbidities  Goal: Patient's chronic conditions and co-morbidity symptoms are monitored and maintained or improved  6/6/2025 1334 by Margot Doe RN  Outcome: Completed  6/6/2025 0240 by Carisa Morris RN  Outcome: Progressing     Problem: Discharge Planning  Goal: Discharge to home or other facility with appropriate resources  6/6/2025 1334 by Margot Doe RN  Outcome: Completed  6/6/2025 0240 by Carisa Morris RN  Outcome: Progressing     Problem: ABCDS Injury Assessment  Goal: Absence of physical injury  6/6/2025 1334 by Margot Doe RN  Outcome: Completed  6/6/2025 0240 by Carisa Morris RN  Outcome: Progressing     Problem: Safety - Adult  Goal: Free from fall injury  6/6/2025 1334 by Margot Doe RN  Outcome: Completed  6/6/2025 0240 by Carisa Morris RN  Outcome: Progressing     Problem: Pain  Goal: Verbalizes/displays adequate comfort level or baseline comfort level  Outcome: Completed

## 2025-06-06 NOTE — PROGRESS NOTES
[facility]  Internal Medicine Teaching Residency Program  Inpatient Daily Progress Note  ______________________________________________________________________________    Patient: Victor Hugo Lama  YOB: 1960   MRN:[id number]    Acct: [id number]     Room: 2017/2017-01  Admit date: 6/4/2025  Today's date: 06/06/25  Number of days in the hospital: 2    SUBJECTIVE   Admitting Diagnosis: Bradycardia  CC:   Chief Complaint   Patient presents with    Bradycardia        Pt examined at bedside. Chart & results reviewed.   Patient continues to be asymptomatic while bradycardia, HR 40s to 60s  Echo results are similar to last year.  Patient was evaluated by cardiology, will discharge on holter monitor.      Intake/Output Summary (Last 24 hours) at 6/6/2025 0725  Last data filed at 6/6/2025 0300  Gross per 24 hour   Intake 810 ml   Output 1020 ml   Net -210 ml      Review of Systems   All other systems reviewed and are negative.        BRIEF HISTORY     The patient is a 64 y.o. 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 Patient states he does not remember what happened at the PCP office and last memory he has is of EMS arriving and bringing him to the hospital. History was provided by patient's sister at bedside who was accompanying patient at doctor's visit. She states patient became diaphoretic and pale and he was about to fall and they held him in the chair. Patient did not hit his head. Patient's HR was noted to be in 40s at the time and EMS arrived and they aPatient states he has a history of syncope and usually he has symptoms like profuse sweating before those episodes. Patient lives with his son and he

## 2025-06-06 NOTE — PROGRESS NOTES
CLINICAL PHARMACY NOTE: MEDS TO BEDS    Total # of Prescriptions Filled: 1   The following medications were delivered to the patient:  Jardiance    Additional Documentation:

## 2025-06-06 NOTE — DISCHARGE INSTRUCTIONS
- please stop taking carvedilol and donepezil at this time  - follow up with cardiologist within 1 month   - we will give you a holter monitor on discharge   - we have started you on new medication empagliflozin for heart failure      -If you begin to experience any symptoms such as chest pain, shortness of breath, nausea, vomiting, dizziness, drowsiness, abdominal pain, loss of consciousness, or any other symptoms you find concerning please return to the ED for follow-up evaluation.  -If you have been given medication please take them as prescribed. Do not take more medication than recommended at any given time..   -Please feel free return to the hospital if your symptoms worsen or any new concerning symptoms develop.  Follow-up with your primary care physician as needed for all other the concerns.

## 2025-06-08 ENCOUNTER — APPOINTMENT (OUTPATIENT)
Dept: GENERAL RADIOLOGY | Age: 65
DRG: 683 | End: 2025-06-08
Payer: MEDICARE

## 2025-06-08 ENCOUNTER — APPOINTMENT (OUTPATIENT)
Dept: CT IMAGING | Age: 65
DRG: 683 | End: 2025-06-08
Payer: MEDICARE

## 2025-06-08 ENCOUNTER — APPOINTMENT (OUTPATIENT)
Dept: ULTRASOUND IMAGING | Age: 65
DRG: 683 | End: 2025-06-08
Payer: MEDICARE

## 2025-06-08 ENCOUNTER — HOSPITAL ENCOUNTER (INPATIENT)
Age: 65
LOS: 2 days | Discharge: HOME | DRG: 683 | End: 2025-06-10
Attending: EMERGENCY MEDICINE | Admitting: STUDENT IN AN ORGANIZED HEALTH CARE EDUCATION/TRAINING PROGRAM
Payer: MEDICARE

## 2025-06-08 DIAGNOSIS — I95.9 HYPOTENSION, UNSPECIFIED HYPOTENSION TYPE: Primary | ICD-10-CM

## 2025-06-08 DIAGNOSIS — I50.42 CHRONIC COMBINED SYSTOLIC (CONGESTIVE) AND DIASTOLIC (CONGESTIVE) HEART FAILURE (HCC): ICD-10-CM

## 2025-06-08 DIAGNOSIS — R93.5 ABNORMAL COMPUTED TOMOGRAPHY ANGIOGRAPHY (CTA) OF ABDOMEN AND PELVIS: ICD-10-CM

## 2025-06-08 DIAGNOSIS — R79.89 ELEVATED TROPONIN: ICD-10-CM

## 2025-06-08 PROBLEM — R42 DIZZINESS: Status: ACTIVE | Noted: 2025-06-08

## 2025-06-08 LAB
ANION GAP SERPL CALCULATED.3IONS-SCNC: 12 MMOL/L (ref 9–16)
BASOPHILS # BLD: 0.03 K/UL (ref 0–0.2)
BASOPHILS NFR BLD: 0 % (ref 0–2)
BNP SERPL-MCNC: 864 PG/ML (ref 0–125)
BUN SERPL-MCNC: 29 MG/DL (ref 8–23)
CALCIUM SERPL-MCNC: 9.6 MG/DL (ref 8.6–10.4)
CHLORIDE SERPL-SCNC: 104 MMOL/L (ref 98–107)
CO2 SERPL-SCNC: 22 MMOL/L (ref 20–31)
CREAT SERPL-MCNC: 2.2 MG/DL (ref 0.7–1.2)
CRP SERPL HS-MCNC: 6.2 MG/L (ref 0–5)
EOSINOPHIL # BLD: 0.17 K/UL (ref 0–0.44)
EOSINOPHILS RELATIVE PERCENT: 2 % (ref 1–4)
ERYTHROCYTE [DISTWIDTH] IN BLOOD BY AUTOMATED COUNT: 18.7 % (ref 11.8–14.4)
GFR, ESTIMATED: 32 ML/MIN/1.73M2
GLUCOSE SERPL-MCNC: 112 MG/DL (ref 74–99)
HCT VFR BLD AUTO: 26.7 % (ref 40.7–50.3)
HGB BLD-MCNC: 8.2 G/DL (ref 13–17)
IMM GRANULOCYTES # BLD AUTO: 0.03 K/UL (ref 0–0.3)
IMM GRANULOCYTES NFR BLD: 0 %
INR PPP: 1.1
LACTIC ACID, WHOLE BLOOD: 1.1 MMOL/L (ref 0.7–2.1)
LYMPHOCYTES NFR BLD: 1.47 K/UL (ref 1.1–3.7)
LYMPHOCYTES RELATIVE PERCENT: 15 % (ref 24–43)
MAGNESIUM SERPL-MCNC: 2.5 MG/DL (ref 1.6–2.4)
MCH RBC QN AUTO: 31.1 PG (ref 25.2–33.5)
MCHC RBC AUTO-ENTMCNC: 30.7 G/DL (ref 28.4–34.8)
MCV RBC AUTO: 101.1 FL (ref 82.6–102.9)
MONOCYTES NFR BLD: 0.98 K/UL (ref 0.1–1.2)
MONOCYTES NFR BLD: 10 % (ref 3–12)
NEUTROPHILS NFR BLD: 73 % (ref 36–65)
NEUTS SEG NFR BLD: 7.07 K/UL (ref 1.5–8.1)
NRBC BLD-RTO: 0 PER 100 WBC
PARTIAL THROMBOPLASTIN TIME: 27.5 SEC (ref 23–36.5)
PLATELET # BLD AUTO: 444 K/UL (ref 138–453)
PMV BLD AUTO: 10.5 FL (ref 8.1–13.5)
POTASSIUM SERPL-SCNC: 4 MMOL/L (ref 3.7–5.3)
PROCALCITONIN SERPL-MCNC: 0.16 NG/ML (ref 0–0.09)
PROTHROMBIN TIME: 14.7 SEC (ref 11.7–14.9)
RBC # BLD AUTO: 2.64 M/UL (ref 4.21–5.77)
RBC # BLD: ABNORMAL 10*6/UL
SODIUM SERPL-SCNC: 138 MMOL/L (ref 136–145)
TROPONIN I SERPL HS-MCNC: 128 NG/L (ref 0–22)
TROPONIN I SERPL HS-MCNC: 135 NG/L (ref 0–22)
TSH SERPL DL<=0.05 MIU/L-ACNC: 0.64 UIU/ML (ref 0.27–4.2)
WBC OTHER # BLD: 9.8 K/UL (ref 3.5–11.3)

## 2025-06-08 PROCEDURE — 85730 THROMBOPLASTIN TIME PARTIAL: CPT

## 2025-06-08 PROCEDURE — 83516 IMMUNOASSAY NONANTIBODY: CPT

## 2025-06-08 PROCEDURE — 85610 PROTHROMBIN TIME: CPT

## 2025-06-08 PROCEDURE — 83605 ASSAY OF LACTIC ACID: CPT

## 2025-06-08 PROCEDURE — 6360000002 HC RX W HCPCS

## 2025-06-08 PROCEDURE — 71045 X-RAY EXAM CHEST 1 VIEW: CPT

## 2025-06-08 PROCEDURE — 2580000003 HC RX 258

## 2025-06-08 PROCEDURE — 71275 CT ANGIOGRAPHY CHEST: CPT

## 2025-06-08 PROCEDURE — 84443 ASSAY THYROID STIM HORMONE: CPT

## 2025-06-08 PROCEDURE — 83880 ASSAY OF NATRIURETIC PEPTIDE: CPT

## 2025-06-08 PROCEDURE — 99223 1ST HOSP IP/OBS HIGH 75: CPT | Performed by: STUDENT IN AN ORGANIZED HEALTH CARE EDUCATION/TRAINING PROGRAM

## 2025-06-08 PROCEDURE — 6360000004 HC RX CONTRAST MEDICATION: Performed by: STUDENT IN AN ORGANIZED HEALTH CARE EDUCATION/TRAINING PROGRAM

## 2025-06-08 PROCEDURE — 99285 EMERGENCY DEPT VISIT HI MDM: CPT

## 2025-06-08 PROCEDURE — 85025 COMPLETE CBC W/AUTO DIFF WBC: CPT

## 2025-06-08 PROCEDURE — 84155 ASSAY OF PROTEIN SERUM: CPT

## 2025-06-08 PROCEDURE — 84165 PROTEIN E-PHORESIS SERUM: CPT

## 2025-06-08 PROCEDURE — 84484 ASSAY OF TROPONIN QUANT: CPT

## 2025-06-08 PROCEDURE — 86140 C-REACTIVE PROTEIN: CPT

## 2025-06-08 PROCEDURE — 76770 US EXAM ABDO BACK WALL COMP: CPT

## 2025-06-08 PROCEDURE — 83735 ASSAY OF MAGNESIUM: CPT

## 2025-06-08 PROCEDURE — 74174 CTA ABD&PLVS W/CONTRAST: CPT

## 2025-06-08 PROCEDURE — 80048 BASIC METABOLIC PNL TOTAL CA: CPT

## 2025-06-08 PROCEDURE — 93005 ELECTROCARDIOGRAM TRACING: CPT | Performed by: STUDENT IN AN ORGANIZED HEALTH CARE EDUCATION/TRAINING PROGRAM

## 2025-06-08 PROCEDURE — 86225 DNA ANTIBODY NATIVE: CPT

## 2025-06-08 PROCEDURE — 2060000000 HC ICU INTERMEDIATE R&B

## 2025-06-08 PROCEDURE — 84145 PROCALCITONIN (PCT): CPT

## 2025-06-08 PROCEDURE — 36415 COLL VENOUS BLD VENIPUNCTURE: CPT

## 2025-06-08 PROCEDURE — 6370000000 HC RX 637 (ALT 250 FOR IP)

## 2025-06-08 PROCEDURE — 86038 ANTINUCLEAR ANTIBODIES: CPT

## 2025-06-08 RX ORDER — POLYETHYLENE GLYCOL 3350 17 G/17G
17 POWDER, FOR SOLUTION ORAL DAILY PRN
Status: DISCONTINUED | OUTPATIENT
Start: 2025-06-08 | End: 2025-06-10 | Stop reason: HOSPADM

## 2025-06-08 RX ORDER — ONDANSETRON 2 MG/ML
4 INJECTION INTRAMUSCULAR; INTRAVENOUS EVERY 6 HOURS PRN
Status: DISCONTINUED | OUTPATIENT
Start: 2025-06-08 | End: 2025-06-10 | Stop reason: HOSPADM

## 2025-06-08 RX ORDER — SODIUM CHLORIDE 0.9 % (FLUSH) 0.9 %
5-40 SYRINGE (ML) INJECTION PRN
Status: DISCONTINUED | OUTPATIENT
Start: 2025-06-08 | End: 2025-06-10 | Stop reason: HOSPADM

## 2025-06-08 RX ORDER — POTASSIUM CHLORIDE 7.45 MG/ML
10 INJECTION INTRAVENOUS PRN
Status: DISCONTINUED | OUTPATIENT
Start: 2025-06-08 | End: 2025-06-10 | Stop reason: HOSPADM

## 2025-06-08 RX ORDER — ONDANSETRON 4 MG/1
4 TABLET, ORALLY DISINTEGRATING ORAL EVERY 8 HOURS PRN
Status: DISCONTINUED | OUTPATIENT
Start: 2025-06-08 | End: 2025-06-10 | Stop reason: HOSPADM

## 2025-06-08 RX ORDER — POTASSIUM CHLORIDE 1500 MG/1
40 TABLET, EXTENDED RELEASE ORAL PRN
Status: DISCONTINUED | OUTPATIENT
Start: 2025-06-08 | End: 2025-06-10 | Stop reason: HOSPADM

## 2025-06-08 RX ORDER — IOPAMIDOL 755 MG/ML
100 INJECTION, SOLUTION INTRAVASCULAR
Status: COMPLETED | OUTPATIENT
Start: 2025-06-08 | End: 2025-06-08

## 2025-06-08 RX ORDER — ACETAMINOPHEN 650 MG/1
650 SUPPOSITORY RECTAL EVERY 6 HOURS PRN
Status: DISCONTINUED | OUTPATIENT
Start: 2025-06-08 | End: 2025-06-10 | Stop reason: HOSPADM

## 2025-06-08 RX ORDER — ENOXAPARIN SODIUM 100 MG/ML
40 INJECTION SUBCUTANEOUS DAILY
Status: DISCONTINUED | OUTPATIENT
Start: 2025-06-08 | End: 2025-06-08

## 2025-06-08 RX ORDER — SODIUM CHLORIDE 0.9 % (FLUSH) 0.9 %
5-40 SYRINGE (ML) INJECTION EVERY 12 HOURS SCHEDULED
Status: DISCONTINUED | OUTPATIENT
Start: 2025-06-08 | End: 2025-06-10 | Stop reason: HOSPADM

## 2025-06-08 RX ORDER — ACETAMINOPHEN 325 MG/1
650 TABLET ORAL EVERY 6 HOURS PRN
Status: DISCONTINUED | OUTPATIENT
Start: 2025-06-08 | End: 2025-06-10 | Stop reason: HOSPADM

## 2025-06-08 RX ORDER — MIDODRINE HYDROCHLORIDE 5 MG/1
5 TABLET ORAL ONCE
Status: COMPLETED | OUTPATIENT
Start: 2025-06-08 | End: 2025-06-08

## 2025-06-08 RX ORDER — SODIUM CHLORIDE 9 MG/ML
INJECTION, SOLUTION INTRAVENOUS PRN
Status: DISCONTINUED | OUTPATIENT
Start: 2025-06-08 | End: 2025-06-10 | Stop reason: HOSPADM

## 2025-06-08 RX ORDER — SODIUM CHLORIDE 9 MG/ML
INJECTION, SOLUTION INTRAVENOUS CONTINUOUS
Status: DISCONTINUED | OUTPATIENT
Start: 2025-06-08 | End: 2025-06-09

## 2025-06-08 RX ORDER — MAGNESIUM SULFATE IN WATER 40 MG/ML
2000 INJECTION, SOLUTION INTRAVENOUS PRN
Status: DISCONTINUED | OUTPATIENT
Start: 2025-06-08 | End: 2025-06-10 | Stop reason: HOSPADM

## 2025-06-08 RX ORDER — HEPARIN SODIUM 5000 [USP'U]/ML
5000 INJECTION, SOLUTION INTRAVENOUS; SUBCUTANEOUS EVERY 8 HOURS SCHEDULED
Status: DISCONTINUED | OUTPATIENT
Start: 2025-06-08 | End: 2025-06-10 | Stop reason: HOSPADM

## 2025-06-08 RX ADMIN — HEPARIN SODIUM 5000 UNITS: 5000 INJECTION INTRAVENOUS; SUBCUTANEOUS at 22:31

## 2025-06-08 RX ADMIN — IOPAMIDOL 100 ML: 755 INJECTION, SOLUTION INTRAVENOUS at 11:37

## 2025-06-08 RX ADMIN — SODIUM CHLORIDE: 0.9 INJECTION, SOLUTION INTRAVENOUS at 14:13

## 2025-06-08 RX ADMIN — MIDODRINE HYDROCHLORIDE 5 MG: 5 TABLET ORAL at 14:13

## 2025-06-08 RX ADMIN — PIPERACILLIN AND TAZOBACTAM 3375 MG: 3; .375 INJECTION, POWDER, LYOPHILIZED, FOR SOLUTION INTRAVENOUS at 18:36

## 2025-06-08 RX ADMIN — HEPARIN SODIUM 5000 UNITS: 5000 INJECTION INTRAVENOUS; SUBCUTANEOUS at 17:56

## 2025-06-08 RX ADMIN — ACETAMINOPHEN 650 MG: 325 TABLET ORAL at 14:13

## 2025-06-08 ASSESSMENT — LIFESTYLE VARIABLES
HOW OFTEN DO YOU HAVE A DRINK CONTAINING ALCOHOL: PATIENT DECLINED
HOW OFTEN DO YOU HAVE A DRINK CONTAINING ALCOHOL: PATIENT DECLINED
HOW MANY STANDARD DRINKS CONTAINING ALCOHOL DO YOU HAVE ON A TYPICAL DAY: PATIENT DECLINED
HOW OFTEN DO YOU HAVE A DRINK CONTAINING ALCOHOL: PATIENT DECLINED

## 2025-06-08 ASSESSMENT — PAIN SCALES - GENERAL: PAINLEVEL_OUTOF10: 0

## 2025-06-08 NOTE — ED TRIAGE NOTES
Pt presents to ED from home via [facility] for \"feeling warm, and started to sweat\" and feeling \"fatigued\". Pt is hypotensive for EMS, 88/40 BP, given 500 mL NS PTA. Pt denies n/v/d, LOC, CP, SOB, fever, chills, injury/trauma, change in bowel/bladder function, loss of appetite,or any other sx.  Pt was just here a few days ago for syncopal episode, was admitted and placed on a holter monitor.      Pt is A&OX4, placed on full monitor, EKG done, IV established, changed into gown and given warm blankets. Labs drawn, labeled, and sent to lab. Pt vitals show bradycardia, which is the same as when he was here the other day, but otherwise WNL (now normotensive). White board updated, and patient is updated on plan of care.

## 2025-06-08 NOTE — CARE COORDINATION
06/08/25 1742   Readmission Assessment   Number of Days since last admission? 1-7 days   Previous Disposition Home with Family   Who is being Interviewed Patient   What was the patient's/caregiver's perception as to why they think they needed to return back to the hospital? Other (Comment)  (not sure if he fell or lost consciousness, son called EMS)   Did you visit your Primary Care Physician after you left the hospital, before you returned this time? No   Why weren't you able to visit your PCP? Other (Comment)  (has appointment last Monday)   Did you see a specialist, such as Cardiac, Pulmonary, Orthopedic Physician, etc. after you left the hospital? No   Who advised the patient to return to the hospital? Other (Comment)  (son called EMs)   Does the patient report anything that got in the way of taking their medications? No   In our efforts to provide the best possible care to you and others like you, can you think of anything that we could have done to help you after you left the hospital the first time, so that you might not have needed to return so soon? Other (Comment)  (no)

## 2025-06-08 NOTE — H&P
Magnesium 2.5 (H) 1.6 - 2.4 mg/dL   Troponin    Collection Time: 06/08/25  9:36 AM   Result Value Ref Range    Troponin, High Sensitivity 135 (HH) 0 - 22 ng/L   TSH reflex to FT4    Collection Time: 06/08/25  9:36 AM   Result Value Ref Range    TSH 0.64 0.27 - 4.20 uIU/mL   Troponin    Collection Time: 06/08/25 10:36 AM   Result Value Ref Range    Troponin, High Sensitivity 128 (HH) 0 - 22 ng/L   Brain Natriuretic Peptide    Collection Time: 06/08/25 10:36 AM   Result Value Ref Range    NT Pro- (H) 0 - 125 pg/mL   C-Reactive Protein    Collection Time: 06/08/25 10:36 AM   Result Value Ref Range    CRP 6.2 (H) 0.0 - 5.0 mg/L       Micro:  Results       Procedure Component Value Units Date/Time    Fecal DNA Colorectal cancer screening (Cologuard) [[id number]]     Order Status: Sent Specimen: Stool              Imaging:   CTA ABDOMEN PELVIS W CONTRAST  Result Date: 6/8/2025  1. No evidence of thoracic or abdominal aortic aneurysm or dissection. 2. TAVR stent in place, and appears satisfactory. 3. 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  Known Moderate-severe SMA, 8 mm from its origin. No intraluminal filling defect within more distal SMA or celiac arteries. 4. Moderate-severe disease left common femoral artery with occluded origin deep femoral artery and severe disease proximal SFA.  Correlate clinically. 5. High density elliptoid abnormality right pelvis extending along the right thigh anterior to the muscle bundle with measurements as above, and with Hounsfield unit measurements suggesting possible hematoma. Some adjacent soft tissue stranding also seen.  Correlate clinically. 6. Additional findings, as above.     CTA CHEST W WO CONTRAST  Result Date: 6/8/2025  1. No evidence of thoracic or abdominal aortic aneurysm or dissection. 2. TAVR stent in place, and appears satisfactory. 3. 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  Known Moderate-severe SMA, 8 mm from

## 2025-06-08 NOTE — ED PROVIDER NOTES
[facility] EMERGENCY DEPARTMENT  Emergency Department Encounter  Emergency Medicine Resident     Pt Name:Victor Hugo Lama  MRN: [id number]  Birthdate 1960  Date of evaluation: 6/8/25  PCP:  Alondra Burgos MD  Note Started: 9:36 AM EDT      CHIEF COMPLAINT       Chief Complaint   Patient presents with    Fatigue    Diaphoresis     \"Felt warm and started to sweat\"       HISTORY OF PRESENT ILLNESS  (Location/Symptom, Timing/Onset, Context/Setting, Quality, Duration, Modifying Factors, Severity.)      Victor Hugo Lama is a 64 y.o. male past medical history of alcohol dependence, congestive heart failure (EF 40% 6/2025), previous cardiac stenting, TAVR in 2021, recent hip surgery 5/20, presenting to the emergency department today due to concern for possible STEMI.  Patient's son had noticed that he was appearing diaphoretic and pale, contacted EMS.  EMS ECG concerning for STEMI.  On assessment in the Emergency Department, the patient has a stable left bundle branch block when compared to ECG from 3 days ago.  Patient was noted to be hypotensive by EMS, normotensive on arrival after receiving approximately 400 cc of IV fluid.  Patient currently without any chest pain.  Denies any shortness of breath nausea vomiting diarrhea or lower extremity swelling.  The patient received 325 of aspirin en route with EMS    PAST MEDICAL / SURGICAL / SOCIAL / FAMILY HISTORY      has a past medical history of Acute systolic congestive heart failure (HCC), Alcohol abuse, Alcohol abuse, Anxiety, Chronic combined systolic (congestive) and diastolic (congestive) heart failure (HCC), Hyperlipidemia, Hypertension, Hypothyroid, Murmur, Osteoarthritis, Smoking, and Vasovagal reaction.       has a past surgical history that includes other surgical history (08/24/2021); amputation (Right); Cardiac catheterization; and Total hip arthroplasty (Right, 5/20/2025).      Social History     Socioeconomic History    Marital status: Single     
57 bpm  Axis: left  Ectopy: none  Conduction: left bundle branch block (complete) with a first-degree AV block  ST Segments: consistent with left bundle branch block  T Waves: consistent with left bundle-branch block  Q Waves: consistent with left bundle branch block    EKG  Impression: Left bundle branch block and appears relatively unchanged from previous EKG 4 days ago when compared    Critical Care  None      (Please note that portions of this note were completed with a voice recognition program. Efforts were made to edit the dictations but occasionally words are mis-transcribed. Whenever words are used in this note in any gender, they shall be construed as though they were used in the gender appropriate to the circumstances; and whenever words are used in this note in the singular or plural form, they shall be construed as though they were used in the form appropriate to the circumstances.)    Hunter Guerra MD [facility]  Attending Emergency Medicine Physician            Hunter Guerra MD  06/08/25 0938       Hunter Guerra MD  06/08/25 0963

## 2025-06-08 NOTE — ED NOTES
Admitting team at bedside.   
Orthostatic BP/Pulse deferred due to BP of 89/55 while laying down, concern for falls and syncope. Will attempt when patient's BP is more stable  
Pt given meal tray  
Pt is hypotensive Dr. Moulton informed and is at bedside. Additional 500 mL bolus given to patient per Dr. Moulton   
Pt is repositioned in bed and provided urinal.   Pt declines assistance with urination and using the urinal   
Pt placed on external catheter and is boosted up in bed.   
Report given to Tye RN and CARIN Alberto. All questions answered.   
The following labs were labeled with appropriate pt sticker and tubed to lab:     [] Blue     [] Lavender   [] on ice  [x] Green/yellow  [] Green/black [] on ice  [] Grey  [] on ice  [] Yellow  [] Red  [] Pink  [] Type/ Screen  [] ABG  [] VBG    [] COVID-19 swab    [] Rapid  [] PCR  [] Flu swab  [] Peds Viral Panel     [] Urine Sample  [] Fecal Sample  [] Pelvic Cultures  [] Blood Cultures  [] X 2  [] STREP Cultures  [] Wound Cultures    
The following labs were labeled with appropriate pt sticker and tubed to lab:     [x] Blue     [x] Lavender   [] on ice  [x] Green/yellow  [x] Green/black [] on ice  [] [place]  [] on ice  [x] Yellow  [] Red  [] Pink  [] Type/ Screen  [] ABG  [] VBG    [] COVID-19 swab    [] Rapid  [] PCR  [] Flu swab  [] Peds Viral Panel     [] Urine Sample  [] Fecal Sample  [] Pelvic Cultures  [] Blood Cultures  [] X 2  [] STREP Cultures  [] Wound Cultures   
Chronic combined systolic (congestive) and diastolic (congestive) heart failure (HCC) 07/19/2021    Hyperlipidemia     Hypertension     Hypothyroid 04/20/2015    Murmur     Osteoarthritis     Smoking 07/12/2021    Vasovagal reaction 07/2021       Labs:  Labs Reviewed   BASIC METABOLIC PANEL - Abnormal; Notable for the following components:       Result Value    Glucose 112 (*)     BUN 29 (*)     Creatinine 2.2 (*)     Est, Glom Filt Rate 32 (*)     All other components within normal limits   CBC WITH AUTO DIFFERENTIAL - Abnormal; Notable for the following components:    RBC 2.64 (*)     Hemoglobin 8.2 (*)     Hematocrit 26.7 (*)     RDW 18.7 (*)     Neutrophils % 73 (*)     Lymphocytes % 15 (*)     All other components within normal limits   MAGNESIUM - Abnormal; Notable for the following components:    Magnesium 2.5 (*)     All other components within normal limits   TROPONIN - Abnormal; Notable for the following components:    Troponin, High Sensitivity 135 (*)     All other components within normal limits   TROPONIN - Abnormal; Notable for the following components:    Troponin, High Sensitivity 128 (*)     All other components within normal limits   BRAIN NATRIURETIC PEPTIDE - Abnormal; Notable for the following components:    NT Pro- (*)     All other components within normal limits   PROTIME-INR   APTT   TSH REFLEX TO FT4   PROCALCITONIN   C-REACTIVE PROTEIN   LACTIC ACID       Electronically signed by Karmen Holcomb RN on 6/8/2025 at 2:46 PM

## 2025-06-08 NOTE — CARE COORDINATION
Case Management Assessment  Initial Evaluation    Date/Time of Evaluation: 6/8/2025 5:46 PM  Assessment Completed by: Elizabeth Peña RN    If patient is discharged prior to next notation, then this note serves as note for discharge by case management.    Patient Name: Victor Hugo Lama                   YOB: 1960  Diagnosis: Dizziness [R42]  Elevated troponin [R79.89]  Abnormal computed tomography angiography (CTA) of abdomen and pelvis [R93.5]  Hypotension, unspecified hypotension type [I95.9]                   Date / Time: 6/8/2025  9:31 AM    Patient Admission Status: Inpatient   Readmission Risk (Low < 19, Mod (19-27), High > 27): Readmission Risk Score: 17.2    Current PCP: Alondra Burgos MD  PCP verified by CM? (P) Yes (Alondra Burgos)    Chart Reviewed: Yes      History Provided by: (P) Patient  Patient Orientation: (P) Alert and Oriented, Person, Place, Situation, Self    Patient Cognition: (P) Alert    Hospitalization in the last 30 days (Readmission):  Yes    If yes, Readmission Assessment in  Navigator will be completed.    Advance Directives:      Code Status: Full Code   Patient's Primary Decision Maker is: (P) Legal Next of Kin      Discharge Planning:    Patient lives with: (P) Children Type of Home: (P) House  Primary Care Giver: (P) Self  Patient Support Systems include: (P) Children, Family Members   Current Financial resources: (P) Medicare  Current community resources: (P) None  Current services prior to admission: (P) Durable Medical Equipment            Current DME: (P) Cane, Walker            Type of Home Care services:  (P) None    ADLS  Prior functional level: (P) Independent in ADLs/IADLs  Current functional level: (P) Independent in ADLs/IADLs    PT AM-PAC:   /24  OT AM-PAC:   /24    Family can provide assistance at DC: (P) Yes  Would you like Case Management to discuss the discharge plan with any other family members/significant others, and if so, who? (P) No  Plans to Return

## 2025-06-09 PROBLEM — I95.9 HYPOTENSION: Status: ACTIVE | Noted: 2025-06-09

## 2025-06-09 PROBLEM — R93.5 ABNORMAL COMPUTED TOMOGRAPHY ANGIOGRAPHY (CTA) OF ABDOMEN AND PELVIS: Status: ACTIVE | Noted: 2025-06-09

## 2025-06-09 LAB
ALBUMIN SERPL-MCNC: 3.9 G/DL (ref 3.5–5.2)
ALBUMIN/GLOB SERPL: 1.1 {RATIO} (ref 1–2.5)
ALP SERPL-CCNC: 90 U/L (ref 40–129)
ALT SERPL-CCNC: 12 U/L (ref 10–50)
ANION GAP SERPL CALCULATED.3IONS-SCNC: 12 MMOL/L (ref 9–16)
AST SERPL-CCNC: 26 U/L (ref 10–50)
BASOPHILS # BLD: <0.03 K/UL (ref 0–0.2)
BASOPHILS NFR BLD: 0 % (ref 0–2)
BILIRUB SERPL-MCNC: 0.3 MG/DL (ref 0–1.2)
BUN SERPL-MCNC: 26 MG/DL (ref 8–23)
CALCIUM SERPL-MCNC: 9.9 MG/DL (ref 8.6–10.4)
CHLORIDE SERPL-SCNC: 105 MMOL/L (ref 98–107)
CO2 SERPL-SCNC: 20 MMOL/L (ref 20–31)
CREAT SERPL-MCNC: 1.6 MG/DL (ref 0.7–1.2)
CREAT UR-MCNC: 79.9 MG/DL (ref 39–259)
CREAT UR-MCNC: 81.7 MG/DL (ref 39–259)
EKG ATRIAL RATE: 57 BPM
EKG P AXIS: 35 DEGREES
EKG P-R INTERVAL: 230 MS
EKG Q-T INTERVAL: 494 MS
EKG QRS DURATION: 204 MS
EKG QTC CALCULATION (BAZETT): 480 MS
EKG R AXIS: 45 DEGREES
EKG T AXIS: 189 DEGREES
EKG VENTRICULAR RATE: 57 BPM
EOSINOPHIL # BLD: 0.11 K/UL (ref 0–0.44)
EOSINOPHILS RELATIVE PERCENT: 1 % (ref 1–4)
ERYTHROCYTE [DISTWIDTH] IN BLOOD BY AUTOMATED COUNT: 18.4 % (ref 11.8–14.4)
GFR, ESTIMATED: 48 ML/MIN/1.73M2
GLUCOSE SERPL-MCNC: 92 MG/DL (ref 74–99)
HCT VFR BLD AUTO: 28.9 % (ref 40.7–50.3)
HGB BLD-MCNC: 8.7 G/DL (ref 13–17)
IMM GRANULOCYTES # BLD AUTO: <0.03 K/UL (ref 0–0.3)
IMM GRANULOCYTES NFR BLD: 0 %
LYMPHOCYTES NFR BLD: 1.06 K/UL (ref 1.1–3.7)
LYMPHOCYTES RELATIVE PERCENT: 12 % (ref 24–43)
MCH RBC QN AUTO: 31 PG (ref 25.2–33.5)
MCHC RBC AUTO-ENTMCNC: 30.1 G/DL (ref 28.4–34.8)
MCV RBC AUTO: 102.8 FL (ref 82.6–102.9)
MICROALBUMIN UR-MCNC: 47 MG/L (ref 0–20)
MICROALBUMIN/CREAT UR-RTO: 59 MCG/MG CREAT (ref 0–17)
MONOCYTES NFR BLD: 0.66 K/UL (ref 0.1–1.2)
MONOCYTES NFR BLD: 7 % (ref 3–12)
NEUTROPHILS NFR BLD: 80 % (ref 36–65)
NEUTS SEG NFR BLD: 7.14 K/UL (ref 1.5–8.1)
NRBC BLD-RTO: 0 PER 100 WBC
OSMOLALITY UR: 553 MOSM/KG (ref 80–1300)
PLATELET # BLD AUTO: 418 K/UL (ref 138–453)
PMV BLD AUTO: 10.8 FL (ref 8.1–13.5)
POTASSIUM SERPL-SCNC: 4.2 MMOL/L (ref 3.7–5.3)
PROT SERPL-MCNC: 7.3 G/DL (ref 6.6–8.7)
RBC # BLD AUTO: 2.81 M/UL (ref 4.21–5.77)
RBC # BLD: ABNORMAL 10*6/UL
SODIUM SERPL-SCNC: 137 MMOL/L (ref 136–145)
SODIUM UR-SCNC: 110 MMOL/L
TOTAL PROTEIN, URINE: 21 MG/DL
URINE TOTAL PROTEIN CREATININE RATIO: 0.26 (ref 0–0.2)
UUN UR-MCNC: 528 MG/DL
WBC OTHER # BLD: 9 K/UL (ref 3.5–11.3)

## 2025-06-09 PROCEDURE — 97162 PT EVAL MOD COMPLEX 30 MIN: CPT

## 2025-06-09 PROCEDURE — 85025 COMPLETE CBC W/AUTO DIFF WBC: CPT

## 2025-06-09 PROCEDURE — 82043 UR ALBUMIN QUANTITATIVE: CPT

## 2025-06-09 PROCEDURE — 99222 1ST HOSP IP/OBS MODERATE 55: CPT | Performed by: SURGERY

## 2025-06-09 PROCEDURE — 84156 ASSAY OF PROTEIN URINE: CPT

## 2025-06-09 PROCEDURE — 97535 SELF CARE MNGMENT TRAINING: CPT

## 2025-06-09 PROCEDURE — 6360000002 HC RX W HCPCS

## 2025-06-09 PROCEDURE — 83935 ASSAY OF URINE OSMOLALITY: CPT

## 2025-06-09 PROCEDURE — 2060000000 HC ICU INTERMEDIATE R&B

## 2025-06-09 PROCEDURE — 84300 ASSAY OF URINE SODIUM: CPT

## 2025-06-09 PROCEDURE — 2580000003 HC RX 258

## 2025-06-09 PROCEDURE — 6370000000 HC RX 637 (ALT 250 FOR IP)

## 2025-06-09 PROCEDURE — 80053 COMPREHEN METABOLIC PANEL: CPT

## 2025-06-09 PROCEDURE — 97530 THERAPEUTIC ACTIVITIES: CPT

## 2025-06-09 PROCEDURE — 82570 ASSAY OF URINE CREATININE: CPT

## 2025-06-09 PROCEDURE — 97166 OT EVAL MOD COMPLEX 45 MIN: CPT

## 2025-06-09 PROCEDURE — 36415 COLL VENOUS BLD VENIPUNCTURE: CPT

## 2025-06-09 PROCEDURE — 84540 ASSAY OF URINE/UREA-N: CPT

## 2025-06-09 RX ORDER — SODIUM CHLORIDE 9 MG/ML
INJECTION, SOLUTION INTRAVENOUS CONTINUOUS
Status: ACTIVE | OUTPATIENT
Start: 2025-06-09 | End: 2025-06-09

## 2025-06-09 RX ORDER — HYDRALAZINE HYDROCHLORIDE 20 MG/ML
10 INJECTION INTRAMUSCULAR; INTRAVENOUS EVERY 6 HOURS PRN
Status: DISCONTINUED | OUTPATIENT
Start: 2025-06-09 | End: 2025-06-10 | Stop reason: HOSPADM

## 2025-06-09 RX ORDER — LEVOTHYROXINE SODIUM 100 UG/1
100 TABLET ORAL DAILY
Status: DISCONTINUED | OUTPATIENT
Start: 2025-06-09 | End: 2025-06-10 | Stop reason: HOSPADM

## 2025-06-09 RX ADMIN — SODIUM CHLORIDE: 9 INJECTION, SOLUTION INTRAVENOUS at 12:39

## 2025-06-09 RX ADMIN — HEPARIN SODIUM 5000 UNITS: 5000 INJECTION INTRAVENOUS; SUBCUTANEOUS at 21:54

## 2025-06-09 RX ADMIN — PIPERACILLIN AND TAZOBACTAM 3375 MG: 3; .375 INJECTION, POWDER, LYOPHILIZED, FOR SOLUTION INTRAVENOUS at 03:44

## 2025-06-09 RX ADMIN — LEVOTHYROXINE SODIUM 100 MCG: 0.1 TABLET ORAL at 14:26

## 2025-06-09 RX ADMIN — HEPARIN SODIUM 5000 UNITS: 5000 INJECTION INTRAVENOUS; SUBCUTANEOUS at 14:26

## 2025-06-10 VITALS
HEART RATE: 64 BPM | SYSTOLIC BLOOD PRESSURE: 142 MMHG | WEIGHT: 196.21 LBS | TEMPERATURE: 98.4 F | HEIGHT: 70 IN | DIASTOLIC BLOOD PRESSURE: 36 MMHG | RESPIRATION RATE: 17 BRPM | OXYGEN SATURATION: 99 % | BODY MASS INDEX: 28.09 KG/M2

## 2025-06-10 PROBLEM — I70.201 FEMORAL ARTERY OCCLUSION, RIGHT: Status: ACTIVE | Noted: 2025-06-10

## 2025-06-10 LAB
ALBUMIN PERCENT: 51 % (ref 56–66)
ALBUMIN SERPL-MCNC: 3.3 G/DL (ref 3.2–5.2)
ALBUMIN SERPL-MCNC: 3.6 G/DL (ref 3.5–5.2)
ALBUMIN/GLOB SERPL: 1.2 {RATIO} (ref 1–2.5)
ALP SERPL-CCNC: 64 U/L (ref 40–129)
ALPHA 2 PERCENT: 14 % (ref 7–12)
ALPHA1 GLOB SERPL ELPH-MCNC: 0.4 G/DL (ref 0.1–0.4)
ALPHA1 GLOB SERPL ELPH-MCNC: 6 % (ref 3–5)
ALPHA2 GLOB SERPL ELPH-MCNC: 0.9 G/DL (ref 0.5–0.9)
ALT SERPL-CCNC: 10 U/L (ref 10–50)
ANA SER QL IA: NEGATIVE
ANCA MYELOPEROXIDASE: <0.3 AU/ML (ref 0–3.5)
ANCA PROTEINASE 3: <0.7 AU/ML (ref 0–2)
ANION GAP SERPL CALCULATED.3IONS-SCNC: 10 MMOL/L (ref 9–16)
AST SERPL-CCNC: 27 U/L (ref 10–50)
B-GLOBULIN SERPL ELPH-MCNC: 0.9 G/DL (ref 0.7–1.4)
B-GLOBULIN SERPL ELPH-MCNC: 13 % (ref 8–13)
BASOPHILS # BLD: <0.03 K/UL (ref 0–0.2)
BASOPHILS NFR BLD: 0 % (ref 0–2)
BILIRUB SERPL-MCNC: 0.3 MG/DL (ref 0–1.2)
BUN SERPL-MCNC: 16 MG/DL (ref 8–23)
CALCIUM SERPL-MCNC: 9.6 MG/DL (ref 8.6–10.4)
CHLORIDE SERPL-SCNC: 108 MMOL/L (ref 98–107)
CO2 SERPL-SCNC: 20 MMOL/L (ref 20–31)
CREAT SERPL-MCNC: 1.2 MG/DL (ref 0.7–1.2)
DSDNA IGG SER QL IA: 1.9 IU/ML
EOSINOPHIL # BLD: 0.12 K/UL (ref 0–0.44)
EOSINOPHILS RELATIVE PERCENT: 2 % (ref 1–4)
ERYTHROCYTE [DISTWIDTH] IN BLOOD BY AUTOMATED COUNT: 18.1 % (ref 11.8–14.4)
GAMMA GLOB SERPL ELPH-MCNC: 1.1 G/DL (ref 0.5–1.5)
GAMMA GLOBULIN %: 16 % (ref 11–19)
GFR, ESTIMATED: 67 ML/MIN/1.73M2
GLUCOSE SERPL-MCNC: 79 MG/DL (ref 74–99)
HCT VFR BLD AUTO: 26.7 % (ref 40.7–50.3)
HGB BLD-MCNC: 8.1 G/DL (ref 13–17)
IMM GRANULOCYTES # BLD AUTO: <0.03 K/UL (ref 0–0.3)
IMM GRANULOCYTES NFR BLD: 0 %
LYMPHOCYTES NFR BLD: 1.03 K/UL (ref 1.1–3.7)
LYMPHOCYTES RELATIVE PERCENT: 18 % (ref 24–43)
MCH RBC QN AUTO: 31.4 PG (ref 25.2–33.5)
MCHC RBC AUTO-ENTMCNC: 30.3 G/DL (ref 28.4–34.8)
MCV RBC AUTO: 103.5 FL (ref 82.6–102.9)
MONOCYTES NFR BLD: 0.53 K/UL (ref 0.1–1.2)
MONOCYTES NFR BLD: 9 % (ref 3–12)
NEUTROPHILS NFR BLD: 71 % (ref 36–65)
NEUTS SEG NFR BLD: 4.17 K/UL (ref 1.5–8.1)
NRBC BLD-RTO: 0 PER 100 WBC
NUCLEAR IGG SER IA-RTO: <0.1 U/ML
PATHOLOGIST: ABNORMAL
PLATELET # BLD AUTO: 364 K/UL (ref 138–453)
PMV BLD AUTO: 10.6 FL (ref 8.1–13.5)
POTASSIUM SERPL-SCNC: 4.4 MMOL/L (ref 3.7–5.3)
PROT PATTERN SERPL ELPH-IMP: ABNORMAL
PROT SERPL-MCNC: 6.5 G/DL (ref 6.6–8.7)
PROT SERPL-MCNC: 6.7 G/DL (ref 6.6–8.7)
RBC # BLD AUTO: 2.58 M/UL (ref 4.21–5.77)
RBC # BLD: ABNORMAL 10*6/UL
RBC # BLD: ABNORMAL 10*6/UL
SODIUM SERPL-SCNC: 138 MMOL/L (ref 136–145)
TOTAL PROT. SUM,%: 100 % (ref 98–102)
TOTAL PROT. SUM: 6.6 G/DL (ref 6.3–8.2)
WBC OTHER # BLD: 5.9 K/UL (ref 3.5–11.3)

## 2025-06-10 PROCEDURE — 80053 COMPREHEN METABOLIC PANEL: CPT

## 2025-06-10 PROCEDURE — 36415 COLL VENOUS BLD VENIPUNCTURE: CPT

## 2025-06-10 PROCEDURE — 85025 COMPLETE CBC W/AUTO DIFF WBC: CPT

## 2025-06-10 PROCEDURE — 99223 1ST HOSP IP/OBS HIGH 75: CPT | Performed by: INTERNAL MEDICINE

## 2025-06-10 PROCEDURE — 6360000002 HC RX W HCPCS

## 2025-06-10 PROCEDURE — 99232 SBSQ HOSP IP/OBS MODERATE 35: CPT | Performed by: STUDENT IN AN ORGANIZED HEALTH CARE EDUCATION/TRAINING PROGRAM

## 2025-06-10 PROCEDURE — 6370000000 HC RX 637 (ALT 250 FOR IP): Performed by: STUDENT IN AN ORGANIZED HEALTH CARE EDUCATION/TRAINING PROGRAM

## 2025-06-10 PROCEDURE — 6370000000 HC RX 637 (ALT 250 FOR IP)

## 2025-06-10 RX ORDER — FUROSEMIDE 20 MG/1
20 TABLET ORAL ONCE AS NEEDED
Qty: 90 TABLET | Refills: 3
Start: 2025-06-10

## 2025-06-10 RX ORDER — FERROUS SULFATE 325(65) MG
325 TABLET, DELAYED RELEASE (ENTERIC COATED) ORAL
Status: DISCONTINUED | OUTPATIENT
Start: 2025-06-10 | End: 2025-06-10 | Stop reason: HOSPADM

## 2025-06-10 RX ORDER — ATORVASTATIN CALCIUM 20 MG/1
20 TABLET, FILM COATED ORAL NIGHTLY
Status: DISCONTINUED | OUTPATIENT
Start: 2025-06-10 | End: 2025-06-10 | Stop reason: HOSPADM

## 2025-06-10 RX ORDER — MIDODRINE HYDROCHLORIDE 5 MG/1
5 TABLET ORAL 3 TIMES DAILY PRN
Qty: 90 TABLET | Refills: 3 | Status: SHIPPED | OUTPATIENT
Start: 2025-06-10

## 2025-06-10 RX ORDER — FOLIC ACID 1 MG/1
1 TABLET ORAL DAILY
Status: DISCONTINUED | OUTPATIENT
Start: 2025-06-10 | End: 2025-06-10 | Stop reason: HOSPADM

## 2025-06-10 RX ORDER — LOSARTAN POTASSIUM 50 MG/1
25 TABLET ORAL DAILY
Status: DISCONTINUED | OUTPATIENT
Start: 2025-06-10 | End: 2025-06-10 | Stop reason: HOSPADM

## 2025-06-10 RX ORDER — MIDODRINE HYDROCHLORIDE 5 MG/1
5 TABLET ORAL 3 TIMES DAILY PRN
Status: DISCONTINUED | OUTPATIENT
Start: 2025-06-10 | End: 2025-06-10 | Stop reason: HOSPADM

## 2025-06-10 RX ORDER — SPIRONOLACTONE 25 MG/1
25 TABLET ORAL DAILY
Status: DISCONTINUED | OUTPATIENT
Start: 2025-06-10 | End: 2025-06-10

## 2025-06-10 RX ORDER — LANOLIN ALCOHOL/MO/W.PET/CERES
100 CREAM (GRAM) TOPICAL DAILY
Status: DISCONTINUED | OUTPATIENT
Start: 2025-06-10 | End: 2025-06-10 | Stop reason: HOSPADM

## 2025-06-10 RX ORDER — ASPIRIN 81 MG/1
81 TABLET, CHEWABLE ORAL DAILY
Status: DISCONTINUED | OUTPATIENT
Start: 2025-06-10 | End: 2025-06-10 | Stop reason: HOSPADM

## 2025-06-10 RX ADMIN — SPIRONOLACTONE 25 MG: 25 TABLET, FILM COATED ORAL at 10:31

## 2025-06-10 RX ADMIN — HEPARIN SODIUM 5000 UNITS: 5000 INJECTION INTRAVENOUS; SUBCUTANEOUS at 08:05

## 2025-06-10 RX ADMIN — ASPIRIN 81 MG: 81 TABLET, CHEWABLE ORAL at 10:31

## 2025-06-10 RX ADMIN — FOLIC ACID 1 MG: 1 TABLET ORAL at 10:31

## 2025-06-10 RX ADMIN — Medication 100 MG: at 10:31

## 2025-06-10 RX ADMIN — LEVOTHYROXINE SODIUM 100 MCG: 0.1 TABLET ORAL at 08:05

## 2025-06-10 RX ADMIN — LOSARTAN POTASSIUM 25 MG: 50 TABLET, FILM COATED ORAL at 10:31

## 2025-06-10 RX ADMIN — FERROUS SULFATE TAB EC 325 MG (65 MG FE EQUIVALENT) 325 MG: 325 (65 FE) TABLET DELAYED RESPONSE at 10:32

## 2025-06-10 NOTE — DISCHARGE INSTRUCTIONS
- follow up with the following physicians: orthopedic surgery, vascular surgery, cardiology, and you PCP  - no longer take empaglifozin/ jardiance  - start taking lasix as needed, not every day.   -see your pcp in 1 week     -If you begin to experience any symptoms such as chest pain, shortness of breath, nausea, vomiting, dizziness, drowsiness, abdominal pain, loss of consciousness, or any other symptoms you find concerning please return to the ED for follow-up evaluation.  -If you have been given medication please take them as prescribed. Do not take more medication than recommended at any given time.   -Please feel free return to the hospital if your symptoms worsen or any new concerning symptoms develop.  Follow-up with your primary care physician as needed for all other the concerns.

## 2025-06-10 NOTE — CARE COORDINATION
Case Management   Daily Progress Note       Patient Name: Victor Hugo Lama                   YOB: 1960  Diagnosis: Dizziness [R42]  Elevated troponin [R79.89]  Abnormal computed tomography angiography (CTA) of abdomen and pelvis [R93.5]  Hypotension, unspecified hypotension type [I95.9]                       GMLOS: 3 days  Length of Stay: 2  days    Anticipated Discharge Date: To be determined    Readmission Risk (Low < 19, Mod (19-27), High > 27): Readmission Risk Score: 15.8        Current Transitional Plan    [x] Home Independently    [] Home with HC    [] Skilled Nursing Facility    [] Acute Rehabilitation    [] Long Term Acute Care (LTAC)    [] Other:     Plan for the Stay (Medical Management) : med changes          Workflow Continuation (Additional Notes) : plan is to return home with son, has walker, is requesting shower chair. PS sent        Hanh Hameed RN  Nela 10, 2025    Discharge Report    [facility]  Clinical Case Management Department  Written by: Hanh Hameed RN    Patient Name: Victor Hugo Lama  Attending Provider: No att. providers found  Admit Date: 2025  9:31 AM  MRN: [id number]  Account: [id number]                     : 1960  Discharge Date: 6/10/2025      Disposition: home    Hanh Hameed RN

## 2025-06-10 NOTE — CONSULTS
provided for review. Automated exposure control, iterative reconstruction, and/or weight based adjustment of the mA/kV was utilized to reduce the radiation dose to as low as reasonably achievable.; CTA of the chest was performed before and after the administration of intravenous contrast.  Multiplanar reformatted images are provided for review.  MIP images are provided for review. Automated exposure control, iterative reconstruction, and/or weight based adjustment of the mA/kV was utilized to reduce the radiation dose to as low as reasonably achievable. COMPARISON: None. CT lung screening from 11/15/2024 and CTA of the chest, abdomen and pelvis from 08/17/2021 HISTORY: ORDERING SYSTEM PROVIDED HISTORY: ro dissection TECHNOLOGIST PROVIDED HISTORY: ro dissection Additional Contrast?->1 Reason for Exam: r/o dissection; ORDERING SYSTEM PROVIDED HISTORY: dissection TECHNOLOGIST PROVIDED HISTORY: dissection Additional Contrast?->1 Reason for Exam: r/o dissection FINDINGS: CTA CHEST: Thoracic aorta: TAVR stent in place, and appears satisfactory right and left coronary artery opacification demonstrated.  No evidence of thoracic aortic aneurysm or dissection.  No acute abnormality of the aorta. Mediastinum: No evidence of mediastinal lymphadenopathy.  The heart and pericardium demonstrate no acute abnormality; prominence left heart chambers and LV mural thickness.  Thyroid and esophagus stable in appearance. Lungs/Pleura: The lungs are without acute process.  Scattered tiny more right-sided calcified granulomas seen.  No focal consolidation or pulmonary edema.  No evidence of pleural effusion or pneumothorax.  Tracheobronchial tree patent; slight bronchial wall thickening possible more peripherally, without mucous plugging. Soft Tissues/Bones: No acute bone or soft tissue abnormality.  Mild chronic appearing wedge deformity T12. CTA ABDOMEN: Abdominal aorta/Branches: No acute dissection or aneurysm.  Scattered calcific 
 !0.19     !142.86    !Bridger           !  +----------+--------+--------+--------+---------+----------+---------------+  !Aortic    !110     !79      !        !         !          !               !  +----------+--------+--------+--------+---------+----------+---------------+    Shunts     Oxygen Values      O2 Ryxhvdhy542.12O2 Xkugberytts086.12     Vascular Resistance    +------------------------------------+----+---+------+---+---------+-------+  !CO method                           !TSVR!SVR!TPVR  !PVR!TPVR/TSVR!PVR/SVR!  +------------------------------------+----+---+------+---+---------+-------+  !Bridger                                !    !   !494.74!   !         !       !  +------------------------------------+----+---+------+---+---------+-------+           LABS:   CBC:   Recent Labs     06/08/25  0936 06/09/25  0559   WBC 9.8 9.0   HGB 8.2* 8.7*   HCT 26.7* 28.9*    418     BMP:   Recent Labs     06/08/25  0936 06/09/25  0559    137   K 4.0 4.2   CO2 22 20   BUN 29* 26*   CREATININE 2.2* 1.6*   LABGLOM 32* 48*   GLUCOSE 112* 92     BNP: No results for input(s): \"BNP\" in the last 72 hours.  PT/INR:   Recent Labs     06/08/25 0936   PROTIME 14.7   INR 1.1     APTT:  Recent Labs     06/08/25 0936   APTT 27.5     CARDIAC ENZYMES:  Lab Results   Component Value Date    CKTOTAL 136 08/24/2021    CKMB 5.8 08/24/2021    TROPHS 128 () 06/08/2025       ASSESSMENT:  Dizziness vs presyncope   History of TAVR in 2021  Hypotension  Bradycardia   known nonischemic cardiomyopathy  Mild CAD on cath in 2021  Echo on 6/2025 revealed mildly reduced LVEF of 47% with normal TAVR function    RECOMMENDATIONS:  maintain SBP >100 and <130.   Obtain orthostatic BP  Will review holter.   Increase fluid and salt intake.   Follow up outpatient for BP management.       Please wait for final attestation from rounding attending       Electronically signed by GUNNAR BROTHERS on 6/10/2025 at 6:32 AM          Attending Physician

## 2025-06-10 NOTE — PROGRESS NOTES
Division of Vascular Surgery             Progress Note      Name: Victor Hugo Lama  MRN: [id number]         Overnight Events:     Patient reports that he had a difficult time getting sleep.  He reports that he has been tangled in the lines time.  He is frustrated that he has not been able to get adequate sleep while in the hospital.    Subjective:     Patient seen and examined in room in regards to CT imaging showing occlusion of the right femoral artery.  Patient denies any pain in bilateral lower extremities or buttock region.  Patient had right hip hemiarthroplasty approximately 4 weeks ago.  No pain on current exam.  He denies fever, chills, chest pain, or shortness of breath.  He continues to have frustrations due to poor sleep.     Physical Exam:     Vitals:  BP (!) 133/32   Pulse 67   Temp 98 °F (36.7 °C) (Oral)   Resp 16   Ht 1.778 m (5' 10\")   Wt 89 kg (196 lb 3.4 oz)   SpO2 98%   BMI 28.15 kg/m²     General appearance - alert, well appearing and in no acute distress  Mental status - oriented to person, place and time with normal affect  Head - normocephalic and atraumatic  Neck - supple, no carotid bruits, thyroid not palpable, no JVD  Chest - clear to auscultation, normal effort  Heart - normal rate, regular rhythm, no murmurs  Abdomen - soft, non-tender, non-distended, bowel sounds present all four quadrants, no masses  Neurological - normal speech, no focal findings or movement disorder noted, cranial nerves II through XII grossly intact  Extremities - peripheral pulses palpable, no pedal edema or calf pain with palpation  Skin - no gross lesions, rashes, or induration noted  Foot Exam -no discoloration of skin, sensation and motor function intact.      Imaging:     Recent Results (from the past 24 hours)   BMP    Collection Time: 06/08/25  9:36 AM   Result Value Ref Range    Sodium 138 136 - 145 mmol/L    Potassium 4.0 3.7 - 5.3 mmol/L    Chloride 104 98 - 107 mmol/L    CO2 22 20 - 31 mmol/L    
 CLINICAL PHARMACY NOTE: MEDS TO BEDS    Total # of Prescriptions Filled: 1   The following medications were delivered to the patient:  MIDODRINE 5MG     Additional Documentation:     LISA DELIVERED, 1.60 OLIVAS   
Congestive Heart Failure Education completed and charted. CHF booklet given. Patient was receptive to education.    Discussed the  importance of medication compliance.    Discussed the importance of a heart healthy diet. Discussed 2000 mg sodium-restricted daily diet.  Patient instructed to limit fluid intake to  1.5 to 2 liters per day.    Patient instructed to weigh self at the same time of each day each morning, reinforced teaching to monitor for 3-5 lb weight increase over 1-2 days notify physician if change noted.      Signs and symptoms of CHF discussed with patient, such as feeling more tired than normal, feeling short of breath, coughing that increases when lying down, sudden weight gain, swelling of the feet, legs or belly.  Patient verbalized understanding to notify physician office if these symptoms occur.  EF 40%   Pt is agreeable to an outpatient CHF Clinic referral . Referral placed.    
Occupational Therapy  Occupational Therapy Initial Evaluation  Facility/Department: [facility] CAR 2- STEPD[facility]   Patient Name: Victor Hugo Lama        MRN: [id number]    : 1960    Date of Service: 2025    Chief Complaint   Patient presents with    Fatigue    Diaphoresis     \"Felt warm and started to sweat\"     Past Medical History:  has a past medical history of Acute systolic congestive heart failure (HCC), Alcohol abuse, Alcohol abuse, Anxiety, Chronic combined systolic (congestive) and diastolic (congestive) heart failure (HCC), Hyperlipidemia, Hypertension, Hypothyroid, Murmur, Osteoarthritis, Smoking, and Vasovagal reaction.  Past Surgical History:  has a past surgical history that includes other surgical history (2021); amputation (Right); Cardiac catheterization; and Total hip arthroplasty (Right, 2025).    Discharge Recommendations  Discharge Recommendations: Patient would benefit from continued therapy after discharge  OT Equipment Recommendations  Equipment Needed: No    Assessment  Performance deficits / Impairments: Decreased functional mobility ;Decreased ADL status;Decreased endurance;Decreased balance;Decreased high-level IADLs  Assessment: Pt would continue to benefit from OT services to address deficits listed above and improve overall functional performance prior to discharge.  Prognosis: Good  Decision Making: Medium Complexity  REQUIRES OT FOLLOW-UP: Yes  Activity Tolerance  Activity Tolerance: Patient Tolerated treatment well  Safety Devices  Type of Devices: All fall risk precautions in place;Patient at risk for falls;Call light within reach;Gait belt;Nurse notified;Left in chair;Chair alarm in place  Restraints  Restraints Initially in Place: No    AM-PAC  AM-PAC Daily Activity - Inpatient   How much help is needed for putting on and taking off regular lower body clothing?: A Lot  How much help is needed for bathing (which includes washing, rinsing, drying)?: A Little  How much 
Patient was ambulatory during discharge. Patient was discharged via private auto with family member to home/private residence. Patient was discharged with all personal belongings. Discharge paperwork and instructions given to patient. Patient acknowledged and verbalized understanding. Any medications or prescriptions were given to patient. Medications regimen and follow up care and directions were explained and reviewed. Any questions were answered.    
2018-2218 kcals/day  Weight Used for Protein Requirements: Current  Protein (g/day): 79-89 g/day  Method Used for Fluid Requirements: Defer to provider  Fluid (ml/day): per MD    Nutrition Diagnosis:   No nutrition diagnosis at this time     Nutrition Interventions:   Food and/or Nutrient Delivery: Continue Current Diet, Start Oral Nutrition Supplement  Nutrition Education/Counseling: Survival skills/brief education completed  Coordination of Nutrition Care: Continue to monitor while inpatient  Plan of Care discussed with: Pt, family    Nutrition Monitoring and Evaluation:   Behavioral-Environmental Outcomes: None Identified  Food/Nutrient Intake Outcomes: Food and Nutrient Intake, Supplement Intake  Physical Signs/Symptoms Outcomes: Biochemical Data, GI Status, Meal Time Behavior, Weight    Discharge Planning:    Continue Oral Nutrition Supplement (as needed)     Mirna Holcomb RDN, LD, MS  Contact: [phone]    
assistance  Quality of Gait: Pt amb w/ moderate reduction in gait speed, symmetric step length, decreased R step length, narrow NAVA, ER LLE, increased hip rotation in R swing phase, mild anterior trunk lean, no LOB.  Distance: 200'  Comments: Further ambulation distance limited by fatigue, BLE weakness.    Balance   Balance  Posture: Good  Sitting - Static: Good;-  Sitting - Dynamic: Good;-  Standing - Static: Fair;+  Standing - Dynamic: Fair  Comments: Standing balance assessed w/ RW.    Patient Education  Patient Education  Education Given To: Patient  Education Provided: Role of Therapy;Plan of Care;Fall Prevention Strategies;Mobility Training  Education Method: Demonstration;Verbal  Barriers to Learning: Cognition  Education Outcome: Verbalized understanding;Demonstrated understanding    Plan  Physical Therapy Plan  General Plan:  (5-6x/wk)  Current Treatment Recommendations: Strengthening, Balance training, Functional mobility training, Transfer training, Endurance training, Gait training, Stair training, Neuromuscular re-education, Home exercise program, Safety education & training, Patient/Caregiver education & training, Equipment evaluation, education, & procurement, Therapeutic activities    Goals  Short Term Goals  Time Frame for Short Term Goals: 14 visits  Short Term Goal 1: Pt will be Eleni in all bed mobility tasks  Short Term Goal 2: Pt will be Eleni in transfers  Short Term Goal 3: Pt will amb 300' Eleni w/ RW or least restrictive device  Short Term Goal 4: Pt will negotiate 3 steps Eleni w/ R rail use.  Additional Goals?: No    Minutes  PT Individual Minutes  Time In: 1131  Time Out: 1200  Minutes: 29  Time Code Minutes  Timed Code Treatment Minutes: 8 Minutes    Electronically signed by Meet Wu PT on 6/9/25 at 3:15 PM EDT    
Not indicated    PT/OT: Consulted  Discharge Planning/SW:  consult for assistance with discharge planning.    Daniella Baptiste MD   Internal Medicine Resident   [facility],  [place], [place].    7:01 AM 6/10/2025   
today:  Diet: ADULT DIET; Regular; Low Sodium (2 gm); 1800 ml   DVT ppx :  heparin    GI ppx: Not indicated    PT/OT: Consulted  Discharge Planning/SW:  consult for assistance with discharge planning.  Daniella Baptiste MD   Internal Medicine Resident   [place], [place].    10:51 AM 6/9/2025     Please note that part of this chart was generated using voice recognition dictation software. Although every effort was made to ensure the accuracy of this automated transcription, some errors in transcription may have occurred.

## 2025-06-10 NOTE — PLAN OF CARE
The patient was evaluated today and a DME order was entered for a shower chair with a back because the patient requires this to be successful with bathing, personal cares and hygiene. A shower chair is necessary due to the patient's unsteady gait, upper and lower body weakness, inability to  the shower safely for a long period of time or bend over to clean lower extremities. The need for this equipment was discussed with the patient and the patient understands and is in agreement.

## 2025-06-11 ENCOUNTER — CARE COORDINATION (OUTPATIENT)
Dept: CARE COORDINATION | Age: 65
End: 2025-06-11

## 2025-06-11 ENCOUNTER — TELEPHONE (OUTPATIENT)
Dept: OTHER | Age: 65
End: 2025-06-11

## 2025-06-11 DIAGNOSIS — R42 DIZZINESS: Primary | ICD-10-CM

## 2025-06-11 PROCEDURE — 1111F DSCHRG MED/CURRENT MED MERGE: CPT | Performed by: STUDENT IN AN ORGANIZED HEALTH CARE EDUCATION/TRAINING PROGRAM

## 2025-06-11 NOTE — CARE COORDINATION
severity of symptoms and risk factors.  Plan for next call: symptom management-check any lightheadedness, BP, weights  follow-up appointment-review HFU appt and review upcoming cardio and CHF clinic appts in early July  medication management-check if required any prn lasix now that aldactone stopped?    Basilia Champagne RN

## 2025-06-11 NOTE — ASSESSMENT & PLAN NOTE
Patient here for medicare wellness but patient was found to be bradycardic and hypotensive and having pre syncopal episode in office   -patient was looking pale and had significant pallor   -EMS was called and patient was transferred to hospital for further care   Patient recently had hip total replacement and per family since then has been having episodes of near syncope

## 2025-06-11 NOTE — PROGRESS NOTES
refill takes 2 to 3 seconds.      Comments: Pallor noted    Psychiatric:         Mood and Affect: Mood normal.         Behavior: Behavior normal.                An electronic signature was used to authenticate this note.    --Alondra Burgos MD   
  Omega-3 Fatty Acids (FISH OIL) 1200 MG CAPS Take 1,200 mg by mouth daily Yes Michael Peoples MD   Biotin 1000 MCG TABS Take 1 tablet by mouth daily Yes Michael Peoples MD   fenofibrate (TRICOR) 145 MG tablet Take 1 tablet by mouth daily Yes Alondra Burgos MD   levothyroxine (SYNTHROID) 100 MCG tablet Take 1 tablet by mouth Daily Yes Alondra Burgos MD   furosemide (LASIX) 20 MG tablet Take 1 tablet by mouth daily Yes Alondra Burgos MD   folic acid (FOLVITE) 1 MG tablet TAKE 1 TABLET BY MOUTH ONCE DAILY Yes Alondra Burgos MD   carvedilol (COREG) 3.125 MG tablet Take 1 tablet by mouth 2 times daily (with meals) Yes David Barcenas MD   vitamin D (ERGOCALCIFEROL) 1.25 MG (67755 UT) CAPS capsule Take 1 capsule by mouth once a week Yes David Barcenas MD   vitamin C (ASCORBIC ACID) 500 MG tablet Take 1 tablet by mouth daily Yes Michael Peoples MD   thiamine mononitrate (THIAMINE) 100 MG tablet Take 1 tablet by mouth daily Yes Elizabeth Sidhu MD   HYDROcodone-acetaminophen (NORCO) 7.5-325 MG per tablet Take 1 tablet by mouth 2 times daily for 30 days. Intended supply: 30 days Max Daily Amount: 2 tablets  Alondra Burgos MD       [facility] (Including outside providers/suppliers regularly involved in providing care):   Patient Care Team:  Alondra Burgos MD as PCP - General (Family Medicine)  Alondra Burgos MD as PCP - Empaneled Provider     Recommendations for Preventive Services Due: see orders and patient instructions/AVS.  Recommended screening schedule for the next 5-10 years is provided to the patient in written form: see Patient Instructions/AVS.     Reviewed and updated this visit:  Tobacco  Allergies  Meds

## 2025-06-11 NOTE — ASSESSMENT & PLAN NOTE
Bradycardia 50s-40s   Patient with near syncopal episode   EMS was called and patient transferred to hospital

## 2025-06-12 RX ORDER — FENOFIBRATE 145 MG/1
145 TABLET, FILM COATED ORAL DAILY
Qty: 90 TABLET | Refills: 1 | Status: SHIPPED | OUTPATIENT
Start: 2025-06-12

## 2025-06-12 NOTE — TELEPHONE ENCOUNTER
Last visit: 6/4/25  Last Med refill: 12/3/24  Does patient have enough medication for 72 hours: No:     Next Visit Date:  Future Appointments   Date Time Provider Department Center   6/13/2025 10:15 AM Alondra Burgos MD [facility] DE[facility]   7/3/2025  9:00 AM [facility] RM 1 [facility] CHF CLI [facility]   7/7/2025  3:30 PM Yanci Gaona, APRN - CNP [facility]   7/16/2025  8:50 AM Jose Varela MD [facility]   8/18/2025 10:45 AM Evelin Cummings MD [facility]   6/5/2026 10:30 AM Alondra Burgos MD [facility]       Health Maintenance   Topic Date Due    DTaP/Tdap/Td vaccine (1 - Tdap) Never done    Annual Wellness Visit (Medicare Advantage)  Never done    A1C test (Diabetic or Prediabetic)  07/02/2025    Lipids  07/02/2025    Shingles vaccine (1 of 2) 12/03/2025 (Originally 12/18/2009)    COVID-19 Vaccine (3 - 2024-25 season) 12/03/2025 (Originally 9/1/2024)    Respiratory Syncytial Virus (RSV) Pregnant or age 60 yrs+ (1 - Risk 60-74 years 1-dose series) 12/03/2025 (Originally 12/18/2019)    Colorectal Cancer Screen  12/14/2025    Depression Screen  06/04/2026    Lung Cancer Screening &/or Counseling  06/08/2026    Flu vaccine  Completed    Pneumococcal 50+ years Vaccine  Completed    AAA screen  Completed    Hepatitis A vaccine  Aged Out    Hepatitis B vaccine  Aged Out    Hib vaccine  Aged Out    Polio vaccine  Aged Out    Meningococcal (ACWY) vaccine  Aged Out    Meningococcal B vaccine  Aged Out    Diabetic Alb to Cr ratio (uACR) test  Discontinued    GFR test (Diabetes, CKD 3-4, OR last GFR 15-59)  Discontinued    Pneumococcal 0-49 years Vaccine  Discontinued    Diabetes screen  Discontinued    Hepatitis C screen  Discontinued    HIV screen  Discontinued    Prostate Specific Antigen (PSA) Screening or Monitoring  Discontinued       Hemoglobin A1C (%)   Date Value   07/02/2024 5.7   04/10/2024 5.9   05/05/2023 5.6             ( goal A1C is < 7)   No

## 2025-06-13 ENCOUNTER — OFFICE VISIT (OUTPATIENT)
Dept: FAMILY MEDICINE CLINIC | Age: 65
End: 2025-06-13

## 2025-06-13 VITALS
SYSTOLIC BLOOD PRESSURE: 120 MMHG | BODY MASS INDEX: 27.98 KG/M2 | OXYGEN SATURATION: 98 % | DIASTOLIC BLOOD PRESSURE: 62 MMHG | WEIGHT: 195 LBS | HEART RATE: 65 BPM

## 2025-06-13 DIAGNOSIS — D64.9 LOW HEMOGLOBIN: ICD-10-CM

## 2025-06-13 DIAGNOSIS — Z12.11 COLON CANCER SCREENING: ICD-10-CM

## 2025-06-13 DIAGNOSIS — Z09 HOSPITAL DISCHARGE FOLLOW-UP: Primary | ICD-10-CM

## 2025-06-13 LAB — ECHO BSA: 2.07 M2

## 2025-06-13 NOTE — PROGRESS NOTES
Victor Hugo Lama (:  1960) is a 64 y.o. male,Established patient, here for evaluation of the following chief complaint(s):  Follow-Up from Hospital (Hypotension, anemia)         Assessment & Plan  Hospital discharge follow-up   Patient admitted from 25 to 25 originally due to bradycardia and hypotension   Evaluated by cardiology during visit ECHO done with no changes from previous  Beta blocker was discontinued and is to be on holter montior and follow up with Cardiology outpatient > upcoming appointment on 25  Jardiance was stopped and recommended patient take lasix as needed   Patient was hypotensive and diaphoratic with vasovagal syncope in the hospital with low hemoglobin in which he was transfused> no work up was done (patient had significant drop in hemoglobin) unlikely dilutional   Will order FIT test today and discussed importance of colonoscopy with patient for assessment  Medications, hospital stay reviewed with patient   Discussed symptoms of anemia and to go to hospital for any signs of bleeding or is symptomatic   Will recheck hemoglobin level in a few weeks     Orders:    AR DISCHARGE MEDS RECONCILED W/ CURRENT OUTPATIENT MED LIST    Low hemoglobin   Patient was transfused in the hospital by 1 unit prbc and found to have low hgb level at 6.8 with last level at 8.1 (stable) will recheck in a few weeks   Patient diaphoretic and hypotensive when admitted   No work up was done in the hospital   Discussed to continue iron supplements twice a day and will recheck (if no improvement will need iron transfusions)   Will order FIT test due to concerns of possible bleed, patient does not want colonoscopy right now, discussed if positive needs to get a colonoscopy   Patient denies any symptoms of bleeding no blood in stool and not vomiting blood  Will check FIT and follow up with patient, discussed importance of obtaining   Discussed if symptoms of any bleeding occur or is symptomatic needs

## 2025-06-13 NOTE — DISCHARGE SUMMARY
[facility]     Department of Internal Medicine - Staff Internal Medicine Teaching Service    INPATIENT DISCHARGE SUMMARY      Patient Identification:  Victor Hugo Lama is a 64 y.o. male.  :  1960  MRN: [id number]     Acct: [id number]   PCP: Alondra Burgos MD  Admit Date:  2025  Discharge date and time: 2025  1:30 PM   Attending Provider: No att. providers found                                     ACTIVE DISCHARGE DIAGNOSES     Hospital Problem Lists:  Principal Problem:    Bradycardia  Active Problems:    Syncope    Elevated troponin    Anemia  Resolved Problems:    * No resolved hospital problems. *      HOSPITAL STAY     Brief Inpatient course:   Victor Hugo Lama is a 64 y.o. male who was admitted for the management of Bradycardia, presented to the emergency department with  a chief complaint of near syncopal episode in PCP office. Patient was found to be bradycardic by EMS and they gave 1 dose of atropine, in the ER patient's HR was 40s to 50s without any associate symptoms. Patient was also found to be in FATUMA as well. Patient was given fluids. Patient also had a hemoglobin <7 at on epoint and received 1 uPRBC per protocol, He did not have any apparent source of bleeding. Patient was evaluated by cardiology and Echo was done. Echo showed EF around 40 %. Patient did not have any episodes of dizzziness in the hospital. His coreg was held on admission and discharge. Patient was discharged on holter monitor by cardiology with recommendations of outpatient follow up.    Procedures/ Significant Interventions:    Holter monitor  Imaging:   US RETROPERITONEAL COMPLETE  Result Date: 2025  Unremarkable ultrasound of the kidneys and urinary bladder.     CTA ABDOMEN PELVIS W CONTRAST  Result Date: 2025  1. No evidence of thoracic or abdominal aortic aneurysm or dissection. 2. TAVR stent in place, and appears satisfactory. 3. Known severe narrowing celiac axis with

## 2025-06-19 ENCOUNTER — CARE COORDINATION (OUTPATIENT)
Dept: CARE COORDINATION | Age: 65
End: 2025-06-19

## 2025-06-19 NOTE — CARE COORDINATION
Care Transitions Note    Follow Up Call     Attempted to reach patient for transitions of care follow up.  Unable to reach patient.      Outreach Attempts:   HIPAA compliant voicemail left for patient.     Care Summary Note: 1st attempt     Follow Up Appointment:   Future Appointments         Provider Specialty Dept Phone    7/3/2025 9:00 AM [facility] RM 1 Cardiology [phone]    7/7/2025 3:30 PM Yanci Gaona APRN - CNP Cardiology [phone]    7/9/2025 10:30 AM Delos Reyes, Arthur, MD Vascular Surgery [phone]    7/15/2025 10:30 AM Alondra Burgos MD Family Medicine [phone]    7/16/2025 8:50 AM Jose Varela MD Orthopedic Surgery [phone]    8/18/2025 10:45 AM Evelin Cummings MD Cardiology [phone]    6/5/2026 10:30 AM Alondra Burgos MD Family Medicine [phone]            Plan for follow-up call in 2-5 days based on severity of symptoms and risk factors. Plan for next call:  symptom management-check any lightheadedness, BP, weights  follow-up appointment-review HFU appt and review upcoming cardio and CHF clinic appts in early July  medication management-check if required any prn lasix now that aldactone stopped?       Deepthi Banks LPN

## 2025-06-20 ENCOUNTER — CARE COORDINATION (OUTPATIENT)
Dept: CARE COORDINATION | Age: 65
End: 2025-06-20

## 2025-06-20 NOTE — CARE COORDINATION
Care Transitions Note    Follow Up Call     Attempted to reach patient for transitions of care follow up.  Unable to reach patient, family, ROXIE.      Outreach Attempts:   Multiple attempts to contact patient, family,   at phone numbers on file.   HIPAA compliant voicemail left for patient, family,  .     Care Summary Note: 2nd attempt-will end care transitions if no return call received.     Follow Up Appointment:   Future Appointments         Provider Specialty Dept Phone    7/3/2025 9:00 AM [facility]  1 Cardiology [phone]    7/7/2025 3:30 PM Yanci Gaona, APRN - CNP Cardiology [phone]    7/9/2025 10:30 AM Delos Reyes, Arthur, MD Vascular Surgery [phone]    7/15/2025 10:30 AM Alondra Burgos MD Family Medicine [phone]    7/16/2025 8:50 AM Jose Varela MD Orthopedic Surgery [phone]    8/18/2025 10:45 AM Evelin Cummings MD Cardiology [phone]    6/5/2026 10:30 AM Alondra Burgos MD Family Medicine [phone]            No further follow-up call indicated based on severity of symptoms and risk factors.     Deepthi Banks LPN

## 2025-06-26 ENCOUNTER — TELEPHONE (OUTPATIENT)
Dept: VASCULAR SURGERY | Age: 65
End: 2025-06-26

## 2025-07-03 ENCOUNTER — HOSPITAL ENCOUNTER (OUTPATIENT)
Dept: OTHER | Age: 65
Discharge: HOME | End: 2025-07-03
Payer: MEDICARE

## 2025-07-03 VITALS
HEART RATE: 83 BPM | OXYGEN SATURATION: 98 % | DIASTOLIC BLOOD PRESSURE: 49 MMHG | BODY MASS INDEX: 28.01 KG/M2 | SYSTOLIC BLOOD PRESSURE: 118 MMHG | RESPIRATION RATE: 20 BRPM | WEIGHT: 195.2 LBS

## 2025-07-03 DIAGNOSIS — I50.22 HEART FAILURE WITH MID-RANGE EJECTION FRACTION (HFMEF) (HCC): Primary | ICD-10-CM

## 2025-07-03 DIAGNOSIS — D64.9 ANEMIA, UNSPECIFIED TYPE: ICD-10-CM

## 2025-07-03 DIAGNOSIS — I95.9 HYPOTENSION, UNSPECIFIED HYPOTENSION TYPE: ICD-10-CM

## 2025-07-03 PROCEDURE — 99214 OFFICE O/P EST MOD 30 MIN: CPT | Performed by: NURSE PRACTITIONER

## 2025-07-03 PROCEDURE — 99213 OFFICE O/P EST LOW 20 MIN: CPT

## 2025-07-03 RX ORDER — HYDROCODONE BITARTRATE AND ACETAMINOPHEN 5; 325 MG/1; MG/1
1 TABLET ORAL EVERY 6 HOURS PRN
COMMUNITY

## 2025-07-03 ASSESSMENT — ENCOUNTER SYMPTOMS
BLOOD IN STOOL: 0
EYE DISCHARGE: 0
SHORTNESS OF BREATH: 0
ABDOMINAL PAIN: 0
COUGH: 1

## 2025-07-03 NOTE — PROGRESS NOTES
CHF Clinic at [facility]    Office: [phone] Fax: [phone]    [facility]  [address] [facility] [address]  Dept: [phone]  Loc: [phone]    Victor Hugo Lama is a 64 y.o. male who presents today for CHF evaluation.       HPI:     Here w/ sister   Denies shortness of breath  Denies  fatigue  Denies Edema   Denies chest pain   Denies  palpitations   No orthopnea , nor PND     Recent admit for HOTN, FATUMA, anemia    Per  PCP note: Will order FIT test today and discussed importance of colonoscopy with patient for assessment. Discussed symptoms of anemia and to go to hospital for any signs of bleeding or is symptomatic     Recent admit.CARDIO note : \" Patient was found to be having FATUMA. Patient was started on Jardiance on his previous admission for HFmrEF. Patient was given fluid bolus and his kidney functions improved. Patient was evaluated by cardiology as well. Patient was started on as needed midodrine for low blood pressure and recommended 2 week outpatient follow up.     FATUMA, Cr up to 2.2 , now wnl.   SGLT2i held & MRA held d/t FATUMA, BB held d/t bradycardia    Son takes BP  &  pt has not needed midodrine, takes for SBP < 100 mmHg.     Pt has appt July 15 th w/ PCP ; no recent CBC f/u labs.   [facility]  appt 7/7        Past Medical History:   Diagnosis Date    Acute systolic congestive heart failure (HCC) 07/22/2021    Alcohol abuse 07/12/2021    Alcohol abuse 07/12/2021    Anxiety     Chronic combined systolic (congestive) and diastolic (congestive) heart failure (HCC) 07/19/2021    History of blood transfusion 06/2025    Hyperlipidemia     Hypertension     Hypothyroid 04/20/2015    Murmur     Osteoarthritis     Smoking 07/12/2021    Vasovagal reaction 07/2021     Past Surgical History:   Procedure Laterality Date    AMPUTATION Right     index, partial amputation    CARDIAC CATHETERIZATION      OTHER

## 2025-07-05 PROBLEM — R79.89 ELEVATED TROPONIN: Status: RESOLVED | Noted: 2025-06-05 | Resolved: 2025-07-05

## 2025-07-15 ENCOUNTER — HOSPITAL ENCOUNTER (OUTPATIENT)
Age: 65
Setting detail: SPECIMEN
Discharge: HOME | End: 2025-07-15

## 2025-07-15 ENCOUNTER — OFFICE VISIT (OUTPATIENT)
Dept: FAMILY MEDICINE CLINIC | Age: 65
End: 2025-07-15
Payer: MEDICARE

## 2025-07-15 VITALS
OXYGEN SATURATION: 98 % | SYSTOLIC BLOOD PRESSURE: 136 MMHG | WEIGHT: 200 LBS | HEART RATE: 65 BPM | DIASTOLIC BLOOD PRESSURE: 84 MMHG | BODY MASS INDEX: 28.7 KG/M2

## 2025-07-15 DIAGNOSIS — Z12.5 PROSTATE CANCER SCREENING: ICD-10-CM

## 2025-07-15 DIAGNOSIS — Z13.1 DIABETES MELLITUS SCREENING: ICD-10-CM

## 2025-07-15 DIAGNOSIS — I50.22 HEART FAILURE WITH MID-RANGE EJECTION FRACTION (HFMEF) (HCC): ICD-10-CM

## 2025-07-15 DIAGNOSIS — D50.8 OTHER IRON DEFICIENCY ANEMIA: Primary | ICD-10-CM

## 2025-07-15 DIAGNOSIS — D64.9 ANEMIA, UNSPECIFIED TYPE: ICD-10-CM

## 2025-07-15 LAB
ANION GAP SERPL CALCULATED.3IONS-SCNC: 10 MMOL/L (ref 9–16)
BUN SERPL-MCNC: 19 MG/DL (ref 8–23)
CALCIUM SERPL-MCNC: 10.2 MG/DL (ref 8.6–10.4)
CHLORIDE SERPL-SCNC: 107 MMOL/L (ref 98–107)
CO2 SERPL-SCNC: 22 MMOL/L (ref 20–31)
CREAT SERPL-MCNC: 1.4 MG/DL (ref 0.7–1.2)
ERYTHROCYTE [DISTWIDTH] IN BLOOD BY AUTOMATED COUNT: 15.1 % (ref 11.8–14.4)
EST. AVERAGE GLUCOSE BLD GHB EST-MCNC: 71 MG/DL
GFR, ESTIMATED: 56 ML/MIN/1.73M2
GLUCOSE SERPL-MCNC: 82 MG/DL (ref 74–99)
HBA1C MFR BLD: 4.1 % (ref 4–6)
HCT VFR BLD AUTO: 31.3 % (ref 40.7–50.3)
HGB BLD-MCNC: 9.5 G/DL (ref 13–17)
MCH RBC QN AUTO: 31.7 PG (ref 25.2–33.5)
MCHC RBC AUTO-ENTMCNC: 30.4 G/DL (ref 28.4–34.8)
MCV RBC AUTO: 104.3 FL (ref 82.6–102.9)
NRBC BLD-RTO: 0 PER 100 WBC
PLATELET # BLD AUTO: 291 K/UL (ref 138–453)
PMV BLD AUTO: 11.5 FL (ref 8.1–13.5)
POTASSIUM SERPL-SCNC: 4.9 MMOL/L (ref 3.7–5.3)
PSA SERPL-MCNC: 0.72 NG/ML (ref 0–4)
RBC # BLD AUTO: 3 M/UL (ref 4.21–5.77)
SODIUM SERPL-SCNC: 139 MMOL/L (ref 136–145)
WBC OTHER # BLD: 6.6 K/UL (ref 3.5–11.3)

## 2025-07-15 PROCEDURE — 99213 OFFICE O/P EST LOW 20 MIN: CPT | Performed by: STUDENT IN AN ORGANIZED HEALTH CARE EDUCATION/TRAINING PROGRAM

## 2025-07-15 PROCEDURE — 1123F ACP DISCUSS/DSCN MKR DOCD: CPT | Performed by: STUDENT IN AN ORGANIZED HEALTH CARE EDUCATION/TRAINING PROGRAM

## 2025-07-15 PROCEDURE — G8427 DOCREV CUR MEDS BY ELIG CLIN: HCPCS | Performed by: STUDENT IN AN ORGANIZED HEALTH CARE EDUCATION/TRAINING PROGRAM

## 2025-07-15 PROCEDURE — 1036F TOBACCO NON-USER: CPT | Performed by: STUDENT IN AN ORGANIZED HEALTH CARE EDUCATION/TRAINING PROGRAM

## 2025-07-15 PROCEDURE — G8419 CALC BMI OUT NRM PARAM NOF/U: HCPCS | Performed by: STUDENT IN AN ORGANIZED HEALTH CARE EDUCATION/TRAINING PROGRAM

## 2025-07-15 PROCEDURE — 3017F COLORECTAL CA SCREEN DOC REV: CPT | Performed by: STUDENT IN AN ORGANIZED HEALTH CARE EDUCATION/TRAINING PROGRAM

## 2025-07-15 NOTE — PROGRESS NOTES
Victor Hugo Lama (:  1960) is a 64 y.o. male,Established patient, here for evaluation of the following chief complaint(s):  Anemia and Hypotension         Assessment & Plan  Other iron deficiency anemia  In hospital had to be transfused, last hemoglobin level at 8.1  Has been on iron supplementation  Pending stool sample for colon cancer screening, refuses colonoscopy   Will recheck cbc and follow up            Diabetes mellitus screening   Due for A1c check last A1c at 5.7  Will recheck today     Orders:    Hemoglobin A1C; Future      Return in about 1 month (around 8/15/2025).       Subjective   64 yr old here for follow up, per patient has been doing much better. Per patient is still following up with orthopedics and has been doing physical therapy twice a week, to continue through the end of July. Denies any blood in stool or any other signs of bleeding.         Review of Systems   Musculoskeletal:  Positive for arthralgias and back pain.   All other systems reviewed and are negative.         Objective   Physical Exam  Vitals reviewed.   Constitutional:       Appearance: Normal appearance.   Eyes:      Extraocular Movements: Extraocular movements intact.      Conjunctiva/sclera: Conjunctivae normal.   Cardiovascular:      Rate and Rhythm: Normal rate and regular rhythm.      Pulses: Normal pulses.      Heart sounds: Normal heart sounds.   Pulmonary:      Effort: Pulmonary effort is normal.      Breath sounds: Normal breath sounds.   Skin:     General: Skin is warm.      Capillary Refill: Capillary refill takes less than 2 seconds.   Neurological:      General: No focal deficit present.      Mental Status: He is alert and oriented to person, place, and time. Mental status is at baseline.   Psychiatric:         Mood and Affect: Mood normal.         Behavior: Behavior normal.         Thought Content: Thought content normal.         Judgment: Judgment normal.                An electronic signature was

## 2025-07-16 ENCOUNTER — OFFICE VISIT (OUTPATIENT)
Dept: ORTHOPEDIC SURGERY | Age: 65
End: 2025-07-16

## 2025-07-16 DIAGNOSIS — Z96.641 STATUS POST TOTAL REPLACEMENT OF RIGHT HIP: Primary | ICD-10-CM

## 2025-07-16 DIAGNOSIS — Z96.641 STATUS POST RIGHT HIP REPLACEMENT: Primary | ICD-10-CM

## 2025-07-16 PROCEDURE — 99024 POSTOP FOLLOW-UP VISIT: CPT | Performed by: ORTHOPAEDIC SURGERY

## 2025-07-16 RX ORDER — HYDROCODONE BITARTRATE AND ACETAMINOPHEN 5; 325 MG/1; MG/1
1 TABLET ORAL EVERY 6 HOURS PRN
Qty: 28 TABLET | Refills: 0 | Status: SHIPPED | OUTPATIENT
Start: 2025-07-16 | End: 2025-07-23

## 2025-07-16 NOTE — PROGRESS NOTES
Victor Hugo returns today status post right total hip arthroplasty on 5/20/2025.  Overall he says his hip is feeling much better.  He does wear a lift on his opposite side as he does have severe disease in the left hip and will require arthroplasty in the future he still in physical therapy at [place].  Still ambulating with a cane but getting around much better    Physical examination notes active motion hip indiscriminately internal extra rotation as well as flexion extension without any difficulty.  Very slight meralgia paresthetica.  Obviously has leg length discrepancy of compared to his contralateral side but otherwise no other physical findings    Impression  Status post right total hip on 5/20/2025.  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

## 2025-07-17 DIAGNOSIS — Z12.11 COLON CANCER SCREENING: ICD-10-CM

## 2025-07-17 DIAGNOSIS — D64.9 LOW HEMOGLOBIN: ICD-10-CM

## 2025-07-17 LAB
CONTROL: PRESENT
FECAL BLOOD IMMUNOCHEMICAL TEST: NEGATIVE

## 2025-07-17 PROCEDURE — 82274 ASSAY TEST FOR BLOOD FECAL: CPT | Performed by: STUDENT IN AN ORGANIZED HEALTH CARE EDUCATION/TRAINING PROGRAM

## 2025-07-21 ASSESSMENT — ENCOUNTER SYMPTOMS: BACK PAIN: 1

## 2025-07-24 ENCOUNTER — HOSPITAL ENCOUNTER (OUTPATIENT)
Dept: OTHER | Age: 65
Discharge: HOME | End: 2025-07-24
Payer: MEDICARE

## 2025-07-24 VITALS
BODY MASS INDEX: 29.44 KG/M2 | DIASTOLIC BLOOD PRESSURE: 68 MMHG | HEART RATE: 88 BPM | RESPIRATION RATE: 20 BRPM | SYSTOLIC BLOOD PRESSURE: 158 MMHG | WEIGHT: 205.2 LBS | OXYGEN SATURATION: 97 %

## 2025-07-24 PROCEDURE — 99212 OFFICE O/P EST SF 10 MIN: CPT

## 2025-07-24 NOTE — PROGRESS NOTES
Date:  2025  Time:  10:48 AM    CHF Clinic at [facility]    Office: [phone] Fax: [phone]    Re:  Victor Hugo Lama   Patient : 1960    Vital Signs: BP (!) 158/68   Pulse 88   Resp 20   Wt 93.1 kg (205 lb 3.2 oz)   SpO2 97%   BMI 29.44 kg/m²                       O2 Device: None (Room air)                           No results for input(s): \"CBC\", \"HGB\", \"HCT\", \"WBC\", \"PLATELET\", \"NA\", \"K\", \"CL\", \"CO2\", \"BUN\", \"CREATININE\", \"GLUCOSE\", \"BNP\", \"INR\" in the last 72 hours.     Respiratory:    Assessment  Charting Type: Reassessment         Cough/Sputum  Cough: None         Peripheral Vascular  RLE Edema: None  LLE Edema: None    Future Appointments   Date Time Provider Department Center   8/15/2025  9:00 AM Ariel Dobbins MD [email or web address]   2025 10:15 AM [facility]   2025 10:00 AM [facility]  1 [facility]   2026 10:30 AM Evelin Cummings MD [facility]   2026 10:30 AM Alondra Burgos MD [facility] DEP      Complaints: 205.2# sent scales     Physician Orders none    Comment : Ambulatory with quad cane acc by sister.  No S/S of acute CHF today.  Discussed and reviewed labs, upcoming appointments, meds, no midadrine use needed, meds, 2000 mg Na diet reinforced, 64 ounces fluit limit, importance of compliance, and when to call.  Scales sent with pt and log sheet with explanation of importance of daily wt and trending, and when to call for wt gain.  Next appointment  @ 1000.    Electronically signed by Melissa Singletary RN on 2025 at 10:48 AM

## 2025-08-04 ENCOUNTER — TELEPHONE (OUTPATIENT)
Dept: FAMILY MEDICINE CLINIC | Age: 65
End: 2025-08-04

## 2025-08-04 DIAGNOSIS — F11.90 CHRONIC, CONTINUOUS USE OF OPIOIDS: Primary | ICD-10-CM

## 2025-08-04 DIAGNOSIS — M16.11 PRIMARY OSTEOARTHRITIS OF RIGHT HIP: ICD-10-CM

## 2025-08-04 DIAGNOSIS — G89.29 HIP PAIN, CHRONIC, RIGHT: ICD-10-CM

## 2025-08-04 DIAGNOSIS — M25.551 HIP PAIN, CHRONIC, RIGHT: ICD-10-CM

## 2025-08-05 RX ORDER — ASPIRIN 81 MG/1
81 TABLET ORAL 2 TIMES DAILY
Qty: 90 TABLET | Refills: 3 | Status: SHIPPED | OUTPATIENT
Start: 2025-08-05

## 2025-08-05 RX ORDER — HYDROCODONE BITARTRATE AND ACETAMINOPHEN 5; 325 MG/1; MG/1
1 TABLET ORAL EVERY 8 HOURS PRN
Qty: 60 TABLET | Refills: 0 | Status: SHIPPED | OUTPATIENT
Start: 2025-08-05 | End: 2025-09-04

## 2025-08-15 ENCOUNTER — OFFICE VISIT (OUTPATIENT)
Dept: VASCULAR SURGERY | Age: 65
End: 2025-08-15
Payer: MEDICARE

## 2025-08-15 VITALS
DIASTOLIC BLOOD PRESSURE: 50 MMHG | OXYGEN SATURATION: 97 % | SYSTOLIC BLOOD PRESSURE: 162 MMHG | HEART RATE: 55 BPM | WEIGHT: 204 LBS | BODY MASS INDEX: 29.2 KG/M2 | TEMPERATURE: 98.2 F | HEIGHT: 70 IN

## 2025-08-15 DIAGNOSIS — K55.1 CHRONIC VASCULAR INSUFFICIENCY OF INTESTINE: Primary | ICD-10-CM

## 2025-08-15 PROCEDURE — G8419 CALC BMI OUT NRM PARAM NOF/U: HCPCS | Performed by: SURGERY

## 2025-08-15 PROCEDURE — 1123F ACP DISCUSS/DSCN MKR DOCD: CPT | Performed by: SURGERY

## 2025-08-15 PROCEDURE — 3017F COLORECTAL CA SCREEN DOC REV: CPT | Performed by: SURGERY

## 2025-08-15 PROCEDURE — G8428 CUR MEDS NOT DOCUMENT: HCPCS | Performed by: SURGERY

## 2025-08-15 PROCEDURE — 99213 OFFICE O/P EST LOW 20 MIN: CPT | Performed by: SURGERY

## 2025-08-15 PROCEDURE — 1036F TOBACCO NON-USER: CPT | Performed by: SURGERY

## 2025-08-26 ENCOUNTER — HOSPITAL ENCOUNTER (OUTPATIENT)
Dept: OTHER | Age: 65
Discharge: HOME | End: 2025-08-26
Payer: MEDICARE

## 2025-08-26 VITALS
WEIGHT: 206 LBS | OXYGEN SATURATION: 98 % | HEART RATE: 74 BPM | RESPIRATION RATE: 16 BRPM | BODY MASS INDEX: 29.56 KG/M2 | SYSTOLIC BLOOD PRESSURE: 149 MMHG | DIASTOLIC BLOOD PRESSURE: 56 MMHG

## 2025-08-26 PROCEDURE — 99212 OFFICE O/P EST SF 10 MIN: CPT

## 2025-09-02 DIAGNOSIS — M25.551 HIP PAIN, CHRONIC, RIGHT: ICD-10-CM

## 2025-09-02 DIAGNOSIS — G89.29 HIP PAIN, CHRONIC, RIGHT: ICD-10-CM

## 2025-09-02 DIAGNOSIS — F11.90 CHRONIC, CONTINUOUS USE OF OPIOIDS: ICD-10-CM

## 2025-09-02 DIAGNOSIS — M16.11 PRIMARY OSTEOARTHRITIS OF RIGHT HIP: ICD-10-CM

## 2025-09-03 RX ORDER — HYDROCODONE BITARTRATE AND ACETAMINOPHEN 5; 325 MG/1; MG/1
1 TABLET ORAL EVERY 8 HOURS PRN
Qty: 60 TABLET | Refills: 0 | Status: SHIPPED | OUTPATIENT
Start: 2025-09-03 | End: 2025-10-03

## (undated) DEVICE — ZIP 16 SURGICAL SKIN CLOSURE DEVICE: Brand: ZIP 16 SURGICAL SKIN CLOSURE DEVICE

## (undated) DEVICE — BLANKET WRM W29.9XL79.1IN UP BODY FORC AIR MISTRAL-AIR

## (undated) DEVICE — GLOVE ORTHO 8   MSG9480

## (undated) DEVICE — COVER,MAYO STAND,XL,STERILE: Brand: MEDLINE

## (undated) DEVICE — KIT SEP W/ BLD DRAW TB SYR NDL TRNQT PD

## (undated) DEVICE — SUTURE STRATAFIX SYMMETRIC PDS + SZ 1 L18IN ABSRB VLT L48MM SXPP1A400

## (undated) DEVICE — COVER,TABLE,60X90,STERILE: Brand: MEDLINE

## (undated) DEVICE — SUTURE ETHIBOND EXCEL SZ 1 L30IN NONABSORBABLE GRN L36MM CT-1 X425H

## (undated) DEVICE — ILLUMINATOR SURG YANKAUER MTL TIP STRL PHOTONSABER Y DISP

## (undated) DEVICE — 4-PORT MANIFOLD: Brand: NEPTUNE 2

## (undated) DEVICE — TIP APPL TOP 2 SPRY

## (undated) DEVICE — SOLUTION IRRIG 1000ML H2O PIC PLAS SHATTERPROOF CONTAINER

## (undated) DEVICE — SOLUTION IRRIG 1000ML 09% SOD CHL USP PIC PLAS CONTAINER

## (undated) DEVICE — SYRINGE MED 50ML LUERLOCK TIP

## (undated) DEVICE — 2108 SERIES SAGITTAL BLADE, NO OFFSET  (24.8 X 1.32 X 87.3MM)

## (undated) DEVICE — DRESSING TRNSPAR W5XL4.5IN FLM SHT SEMIPERMEABLE WIND

## (undated) DEVICE — ST CHARLES TOTAL HIP: Brand: MEDLINE INDUSTRIES, INC.

## (undated) DEVICE — SUTURE MONOCRYL STRATAFIX SPRL + SZ 2 0 L27IN ABSRB UD W NDL SXMP1B419

## (undated) DEVICE — 450 ML BOTTLE OF 0.05% CHLORHEXIDINE GLUCONATE IN 99.95% STERILE WATER FOR IRRIGATION, USP AND APPLICATOR.: Brand: IRRISEPT ANTIMICROBIAL WOUND LAVAGE

## (undated) DEVICE — KIT AUTOTRNS APPL AERO 2 SET SYR 2 TIP FOR PLT SEP SYS GPS

## (undated) DEVICE — SOL IRR SOD CHL 0.9% TITAN XL CNTNR 3000ML

## (undated) DEVICE — CONNEXT SURGICAL MATRIX - LARGE SYRINGE: Brand: CONNEXT SURGICAL MATRIX

## (undated) DEVICE — GLOVE SURG SZ 85 L12IN FNGR THK79MIL GRN LTX FREE

## (undated) DEVICE — DRESSING HYDROCOLLOID BORDER 35X10 IN ALUM PRIMASEAL